# Patient Record
Sex: MALE | Race: WHITE | Employment: OTHER | URBAN - METROPOLITAN AREA
[De-identification: names, ages, dates, MRNs, and addresses within clinical notes are randomized per-mention and may not be internally consistent; named-entity substitution may affect disease eponyms.]

---

## 2017-09-21 DIAGNOSIS — I63.9 STROKE (H): Primary | ICD-10-CM

## 2017-09-21 DIAGNOSIS — G45.9 TRANSIENT CEREBRAL ISCHEMIA: ICD-10-CM

## 2017-09-21 DIAGNOSIS — I63.30 CEREBRAL THROMBOSIS WITH CEREBRAL INFARCTION (H): ICD-10-CM

## 2017-10-03 ENCOUNTER — HOSPITAL ENCOUNTER (OUTPATIENT)
Dept: CARDIOLOGY | Facility: CLINIC | Age: 72
Discharge: HOME | End: 2017-10-03
Attending: PSYCHIATRY & NEUROLOGY | Admitting: PSYCHIATRY & NEUROLOGY
Payer: MEDICARE

## 2017-10-03 DIAGNOSIS — I63.30 CEREBRAL THROMBOSIS WITH CEREBRAL INFARCTION (H): ICD-10-CM

## 2017-10-03 DIAGNOSIS — I63.9 STROKE (H): ICD-10-CM

## 2017-10-03 DIAGNOSIS — G45.9 TRANSIENT CEREBRAL ISCHEMIA: ICD-10-CM

## 2017-10-03 PROCEDURE — 93306 TTE W/DOPPLER COMPLETE: CPT

## 2017-10-03 PROCEDURE — 93306 TTE W/DOPPLER COMPLETE: CPT | Mod: 26 | Performed by: INTERNAL MEDICINE

## 2017-11-26 ENCOUNTER — OFFICE VISIT (OUTPATIENT)
Dept: URGENT CARE | Facility: URGENT CARE | Age: 72
End: 2017-11-26
Payer: COMMERCIAL

## 2017-11-26 ENCOUNTER — RADIANT APPOINTMENT (OUTPATIENT)
Dept: GENERAL RADIOLOGY | Facility: CLINIC | Age: 72
End: 2017-11-26
Attending: FAMILY MEDICINE
Payer: COMMERCIAL

## 2017-11-26 VITALS
OXYGEN SATURATION: 98 % | SYSTOLIC BLOOD PRESSURE: 130 MMHG | TEMPERATURE: 99.2 F | WEIGHT: 187.5 LBS | HEART RATE: 101 BPM | DIASTOLIC BLOOD PRESSURE: 81 MMHG

## 2017-11-26 DIAGNOSIS — R06.2 WHEEZING: ICD-10-CM

## 2017-11-26 DIAGNOSIS — J06.9 UPPER RESPIRATORY TRACT INFECTION, UNSPECIFIED TYPE: Primary | ICD-10-CM

## 2017-11-26 DIAGNOSIS — R07.89 CHEST PRESSURE: ICD-10-CM

## 2017-11-26 DIAGNOSIS — R06.02 SOB (SHORTNESS OF BREATH): ICD-10-CM

## 2017-11-26 DIAGNOSIS — R50.9 FEVER AND CHILLS: ICD-10-CM

## 2017-11-26 DIAGNOSIS — R05.9 COUGH: ICD-10-CM

## 2017-11-26 LAB
D DIMER PPP FEU-MCNC: 0.5 UG/ML FEU (ref 0–0.5)
DIFFERENTIAL METHOD BLD: ABNORMAL
EOSINOPHIL # BLD AUTO: 0.2 10E9/L (ref 0–0.7)
EOSINOPHIL NFR BLD AUTO: 1 %
LYMPHOCYTES # BLD AUTO: 2 10E9/L (ref 0.8–5.3)
LYMPHOCYTES NFR BLD AUTO: 13 %
MONOCYTES # BLD AUTO: 1.2 10E9/L (ref 0–1.3)
MONOCYTES NFR BLD AUTO: 8 %
NEUTROPHILS # BLD AUTO: 11.7 10E9/L (ref 1.6–8.3)
NEUTROPHILS NFR BLD AUTO: 78 %
PLATELET # BLD EST: NORMAL 10*3/UL
RBC MORPH BLD: ABNORMAL
TROPONIN I SERPL-MCNC: <0.015 UG/L (ref 0–0.04)
WBC # BLD AUTO: 15.1 10E9/L (ref 4–11)

## 2017-11-26 PROCEDURE — 99214 OFFICE O/P EST MOD 30 MIN: CPT | Performed by: FAMILY MEDICINE

## 2017-11-26 PROCEDURE — 93000 ELECTROCARDIOGRAM COMPLETE: CPT | Performed by: FAMILY MEDICINE

## 2017-11-26 PROCEDURE — 85004 AUTOMATED DIFF WBC COUNT: CPT | Performed by: FAMILY MEDICINE

## 2017-11-26 PROCEDURE — 85379 FIBRIN DEGRADATION QUANT: CPT | Performed by: FAMILY MEDICINE

## 2017-11-26 PROCEDURE — 84484 ASSAY OF TROPONIN QUANT: CPT | Performed by: FAMILY MEDICINE

## 2017-11-26 PROCEDURE — 36415 COLL VENOUS BLD VENIPUNCTURE: CPT | Performed by: FAMILY MEDICINE

## 2017-11-26 PROCEDURE — 71020 XR CHEST 2 VW: CPT

## 2017-11-26 PROCEDURE — 85048 AUTOMATED LEUKOCYTE COUNT: CPT | Performed by: FAMILY MEDICINE

## 2017-11-26 RX ORDER — CLOPIDOGREL BISULFATE 75 MG/1
75 TABLET ORAL DAILY
COMMUNITY
Start: 2006-05-01

## 2017-11-26 RX ORDER — ATENOLOL 50 MG/1
TABLET ORAL
COMMUNITY
Start: 2006-05-01 | End: 2017-12-25

## 2017-11-26 RX ORDER — TEMAZEPAM 15 MG/1
15 CAPSULE ORAL
COMMUNITY
Start: 2017-07-06 | End: 2017-12-25

## 2017-11-26 RX ORDER — ALBUTEROL SULFATE 90 UG/1
2 AEROSOL, METERED RESPIRATORY (INHALATION) EVERY 6 HOURS PRN
Qty: 1 INHALER | Refills: 0 | Status: SHIPPED | OUTPATIENT
Start: 2017-11-26

## 2017-11-26 RX ORDER — METOPROLOL SUCCINATE 100 MG/1
100 TABLET, EXTENDED RELEASE ORAL DAILY
COMMUNITY
Start: 2017-11-06

## 2017-11-26 RX ORDER — PRAVASTATIN SODIUM 20 MG
80 TABLET ORAL EVERY EVENING
Status: ON HOLD | COMMUNITY
Start: 2006-05-01 | End: 2019-09-25

## 2017-11-26 RX ORDER — ACETAMINOPHEN 500 MG
1000 TABLET ORAL EVERY 6 HOURS PRN
COMMUNITY
Start: 2016-04-07

## 2017-11-26 RX ORDER — HYDROXYZINE HYDROCHLORIDE 10 MG/1
TABLET, FILM COATED ORAL
COMMUNITY
Start: 2016-07-27 | End: 2017-12-25

## 2017-11-26 RX ORDER — AZITHROMYCIN 250 MG/1
TABLET, FILM COATED ORAL
Qty: 6 TABLET | Refills: 1 | Status: SHIPPED | OUTPATIENT
Start: 2017-11-26 | End: 2017-12-25

## 2017-11-26 RX ORDER — HYDROCHLOROTHIAZIDE 12.5 MG/1
CAPSULE ORAL
Refills: 3 | Status: ON HOLD | COMMUNITY
Start: 2016-12-24 | End: 2019-09-25

## 2017-11-26 RX ORDER — TRAZODONE HYDROCHLORIDE 50 MG/1
50 TABLET, FILM COATED ORAL AT BEDTIME
Refills: 0 | COMMUNITY
Start: 2017-09-05

## 2017-11-26 NOTE — NURSING NOTE
"Chief Complaint   Patient presents with     Urgent Care     Chest Pain     chest pain, sob, fever. pt states feels like \"someone is standing on my chest\" x 1 week       Initial /58 (BP Location: Right arm, Patient Position: Chair, Cuff Size: Adult Regular)  Pulse 116  Temp 97.4  F (36.3  C) (Oral)  Wt 187 lb 8 oz (85 kg)  SpO2 98% There is no height or weight on file to calculate BMI.  Medication Reconciliation: unable or not appropriate to perform   Larry Amos Medical Assistant      "

## 2017-11-26 NOTE — MR AVS SNAPSHOT
"              After Visit Summary   2017    Emmanuel [facility]    MRN: [id number]           Patient Information     Date Of Birth          1945        Visit Information        Provider Department      2017 12:35 PM Lorin Valenzuela MD [facility]        Today's Diagnoses     Upper respiratory tract infection, unspecified type    -  1    Chest pressure        Cough        Fever and chills        SOB (shortness of breath)        Wheezing           Follow-ups after your visit        Who to contact     If you have questions or need follow up information about today's clinic visit or your schedule please contact [facility] directly at [phone].  Normal or non-critical lab and imaging results will be communicated to you by MyChart, letter or phone within 4 business days after the clinic has received the results. If you do not hear from us within 7 days, please contact the clinic through MyChart or phone. If you have a critical or abnormal lab result, we will notify you by phone as soon as possible.  Submit refill requests through [employer] or call your pharmacy and they will forward the refill request to us. Please allow 3 business days for your refill to be completed.          Additional Information About Your Visit        MyChart Information     [employer] lets you send messages to your doctor, view your test results, renew your prescriptions, schedule appointments and more. To sign up, go to [email or web address] . Click on \"Log in\" on the left side of the screen, which will take you to the Welcome page. Then click on \"Sign up Now\" on the right side of the page.     You will be asked to enter the access code listed below, as well as some personal information. Please follow the directions to create your username and password.     Your access code is: [email or web address]  Expires: 2018  2:38 PM     Your access code will  in 90 days. If you need " help or a new code, please call your [place] clinic or [phone].        Care EveryWhere ID     This is your Care EveryWhere ID. This could be used by other organizations to access your [place] medical records  [id number]        Your Vitals Were     Pulse Temperature Pulse Oximetry             101 99.2  F (37.3  C) (Oral) 98%          Blood Pressure from Last 3 Encounters:   11/26/17 130/81   08/01/16 (!) 140/92    Weight from Last 3 Encounters:   11/26/17 187 lb 8 oz (85 kg)   08/01/16 180 lb (81.6 kg)              We Performed the Following     D dimer, quantitative     EKG 12-lead complete w/read - Clinics     Troponin I     WBC with Diff     XR Chest 2 Views          Today's Medication Changes          These changes are accurate as of: 11/26/17  2:38 PM.  If you have any questions, ask your nurse or doctor.               Start taking these medicines.        Dose/Directions    albuterol 108 (90 BASE) MCG/ACT Inhaler   Commonly known as:  PROAIR HFA/PROVENTIL HFA/VENTOLIN HFA   Used for:  Wheezing   Started by:  Lorin Valenzuela MD        Dose:  2 puff   Inhale 2 puffs into the lungs every 6 hours as needed for shortness of breath / dyspnea or wheezing   Quantity:  1 Inhaler   Refills:  0       aspirin  MG EC tablet   Used for:  Chest pressure   Started by:  Lorin Valenzuela MD        Dose:  325 mg   Take 1 tablet (325 mg) by mouth once for 1 dose   Quantity:  1 tablet   Refills:  0       azithromycin 250 MG tablet   Commonly known as:  ZITHROMAX   Used for:  Upper respiratory tract infection, unspecified type, Cough, Fever and chills   Started by:  Lorin Valenzuela MD        Two tablets first day, then one tablet daily for four days.   Quantity:  6 tablet   Refills:  1            Where to get your medicines      [facility] [address] [facility] [address] [facility] [address] [facility] [address]     Phone:   [phone]     albuterol 108 (90 BASE) MCG/ACT Inhaler    azithromycin 250 MG tablet         Some of these will need a paper prescription and others can be bought over the counter.  Ask your nurse if you have questions.     You don't need a prescription for these medications     aspirin  MG EC tablet                Primary Care Provider Office Phone # Fax #    Nicolette Carlson [phone]       [facility] [address]  [facility] [address]        Equal Access to Services     DIXIE AREVALO : Hadii aad ku hadasho Soomaali, waaxda luqadaha, qaybta kaalmada adeegyada, waxay idiin hayaan adeeg kharash laanuj . So [facility] [phone].    ATENCIÓN: Si habla español, tiene a watt disposición servicios gratuitos de asistencia lingüística. Ll[facility] [phone].    We comply with applicable federal civil rights laws and [place] laws. We do not discriminate on the basis of race, color, national origin, age, disability, sex, sexual orientation, or gender identity.            Thank you!     Thank you for choosing [facility]  for your care. Our goal is always to provide you with excellent care. Hearing back from our patients is one way we can continue to improve our services. Please take a few minutes to complete the written survey that you may receive in the mail after your visit with us. Thank you!             Your Updated Medication List - Protect others around you: Learn how to safely use, store and throw away your medicines at www.disposemymeds.org.          This list is accurate as of: 11/26/17  2:38 PM.  Always use your most recent med list.                   Brand Name Dispense Instructions for use Diagnosis    acetaminophen 500 MG tablet    TYLENOL     Take 1,000 mg by mouth        albuterol 108 (90 BASE) MCG/ACT Inhaler    PROAIR HFA/PROVENTIL HFA/VENTOLIN HFA    1 Inhaler    Inhale 2 puffs into the lungs every 6 hours as needed for shortness of breath /  dyspnea or wheezing    Wheezing       aspirin  MG EC tablet     1 tablet    Take 1 tablet (325 mg) by mouth once for 1 dose    Chest pressure       atenolol 50 MG tablet    TENORMIN          azithromycin 250 MG tablet    ZITHROMAX    6 tablet    Two tablets first day, then one tablet daily for four days.    Upper respiratory tract infection, unspecified type, Cough, Fever and chills       cephALEXin 500 MG capsule    KEFLEX    30 capsule    Take 1 capsule (500 mg) by mouth 3 times daily    Open wound       clopidogrel 75 MG tablet    PLAVIX          hydrochlorothiazide 12.5 MG capsule    MICROZIDE     TK ONE C PO  QAM TO  LOWER BP AND REMOVE FLUID        hydrOXYzine 10 MG tablet    ATARAX          metoprolol 100 MG 24 hr tablet    TOPROL-XL     Take 100 mg by mouth        PRAVACHOL 20 MG tablet   Generic drug:  pravastatin           temazepam 15 MG capsule    RESTORIL     Take 15 mg by mouth        traZODone 50 MG tablet    DESYREL     TK 1 T PO QHS        vitamin D3 1000 UNITS Caps      Take 1,000 Units by mouth

## 2017-11-26 NOTE — PROGRESS NOTES
"Chief Complaint   Patient presents with     Urgent Care     Chest Pain     chest pressure , sob, fever. pt states feels like \"someone is standing on my chest\" x 1 week     SUBJECTIVE:  Emmanuel [facility] is a 72 year old male who presents to the clinic today with a chief complaint of cough  for 1 week, along with chest pressure and sob for last 1 week  Also noticing fever and chills along with night sweats for the same duration   His cough is described as persistent and productive of yellow sputum.    The patient's symptoms are moderate and worsening.  Associated symptoms include congestion and chest pressure . Chest pressure gets better after coughing The patient's symptoms are exacerbated by no particular triggers  Patient has been using OTC cough suppressants  to improve symptoms.]  He denies any chest pain and has been feeling overall fine   Was given an aspirin immediately thinking if its CAD   He has no chest pressure now and has no chest pain OR SOB      History reviewed. No pertinent past medical history.    Current Outpatient Prescriptions   Medication Sig Dispense Refill     acetaminophen (TYLENOL) 500 MG tablet Take 1,000 mg by mouth       atenolol (TENORMIN) 50 MG tablet        Cholecalciferol (VITAMIN D3) 1000 UNITS CAPS Take 1,000 Units by mouth       clopidogrel (PLAVIX) 75 MG tablet        hydrochlorothiazide (MICROZIDE) 12.5 MG capsule TK ONE C PO  QAM TO  LOWER BP AND REMOVE FLUID  3     hydrOXYzine (ATARAX) 10 MG tablet        metoprolol (TOPROL-XL) 100 MG 24 hr tablet Take 100 mg by mouth       pravastatin (PRAVACHOL) 20 MG tablet        temazepam (RESTORIL) 15 MG capsule Take 15 mg by mouth       traZODone (DESYREL) 50 MG tablet TK 1 T PO QHS  0     cephALEXin (KEFLEX) 500 MG capsule Take 1 capsule (500 mg) by mouth 3 times daily (Patient not taking: Reported on 11/26/2017) 30 capsule 0       Social History   Substance Use Topics     Smoking status: Current Every Day Smoker     Smokeless tobacco: " Not on file     Alcohol use Not on file       ROS  Review of systems negative except as stated above.    OBJECTIVE:  /58 (BP Location: Right arm, Patient Position: Chair, Cuff Size: Adult Regular)  Pulse 116  Temp 97.4  F (36.3  C) (Oral)  Wt 187 lb 8 oz (85 kg)  SpO2 98%  GENERAL APPEARANCE: healthy, alert and no distress  EYES: EOMI,  PERRL, conjunctiva clear  HENT: ear canals and TM's normal.  Nose and mouth without ulcers, erythema or lesions  NECK: supple, nontender, no lymphadenopathy  RESP: lungs clear to auscultation - positive for rales, rhonchi and  Wheezes bilaterally   CV: regular rates and rhythm, normal S1 S2, no murmur noted  ABDOMEN:  soft, nontender, no HSM or masses and bowel sounds normal  SKIN: no suspicious lesions or rashes      Results for orders placed or performed in visit on 11/26/17   XR Chest 2 Views    Narrative    CHEST TWO VIEWS November 26, 2017 1:23 PM     HISTORY: Cough; fever and chills.    COMPARISON: None.    FINDINGS: Heart size normal. Minimal linear opacity left mid lung base  likely fibrosis. Nothing clearly acute.      Impression    IMPRESSION: No acute abnormality.    CLAUDIA CUNNINGHAM MD   WBC with Diff   Result Value Ref Range    WBC 15.1 (H) 4.0 - 11.0 10e9/L    Diff Method PENDING     % Neutrophils 78.0 %    % Lymphocytes 13.0 %    % Monocytes 8.0 %    % Eosinophils 1.0 %    Absolute Neutrophil 11.7 (H) 1.6 - 8.3 10e9/L    Absolute Lymphocytes 2.0 0.8 - 5.3 10e9/L    Absolute Monocytes 1.2 0.0 - 1.3 10e9/L    Absolute Eosinophils 0.2 0.0 - 0.7 10e9/L    RBC Morphology Consistent with reported results     Platelet Estimate Normal    Troponin I   Result Value Ref Range    Troponin I ES <0.015 0.000 - 0.045 ug/L   D dimer, quantitative   Result Value Ref Range    D Dimer 0.5 0.0 - 0.50 ug/ml FEU       ASSESSMENT:    Emmanuel was seen today for urgent care and chest pain.    Diagnoses and all orders for this visit:    Upper respiratory tract infection, unspecified  type  -     azithromycin (ZITHROMAX) 250 MG tablet; Two tablets first day, then one tablet daily for four days.    Chest pressure  -     EKG 12-lead complete w/read - Clinics  -     Troponin I  -     D dimer, quantitative    Cough  -     XR Chest 2 Views  -     azithromycin (ZITHROMAX) 250 MG tablet; Two tablets first day, then one tablet daily for four days.    Fever and chills  -     XR Chest 2 Views  -     azithromycin (ZITHROMAX) 250 MG tablet; Two tablets first day, then one tablet daily for four days.    SOB (shortness of breath)  -     WBC with Diff  -     D dimer, quantitative    Wheezing  -     albuterol (PROAIR HFA/PROVENTIL HFA/VENTOLIN HFA) 108 (90 BASE) MCG/ACT Inhaler; Inhale 2 puffs into the lungs every 6 hours as needed for shortness of breath / dyspnea or wheezing          PLAN:  See orders in Epic  Symptomatic measures encouraged, humidified air, plenty of fluids.discussed with pt if notices any worsening chest pressure or SOB should follow up in the ER   Follow up if  symptoms fail to improve or worsens   Pt understood and agreed with plan

## 2017-12-25 ENCOUNTER — APPOINTMENT (OUTPATIENT)
Dept: GENERAL RADIOLOGY | Facility: CLINIC | Age: 72
DRG: 178 | End: 2017-12-25
Attending: INTERNAL MEDICINE
Payer: MEDICARE

## 2017-12-25 ENCOUNTER — HOSPITAL ENCOUNTER (INPATIENT)
Facility: CLINIC | Age: 72
LOS: 5 days | Discharge: HOME | DRG: 178 | End: 2017-12-30
Attending: INTERNAL MEDICINE | Admitting: INTERNAL MEDICINE
Payer: MEDICARE

## 2017-12-25 DIAGNOSIS — J18.9 PNEUMONIA OF RIGHT LOWER LOBE DUE TO INFECTIOUS ORGANISM: ICD-10-CM

## 2017-12-25 DIAGNOSIS — J44.1 COPD EXACERBATION (H): ICD-10-CM

## 2017-12-25 LAB
ANION GAP SERPL CALCULATED.3IONS-SCNC: 10 MMOL/L (ref 3–14)
BASOPHILS # BLD AUTO: 0 10E9/L (ref 0–0.2)
BASOPHILS NFR BLD AUTO: 0.4 %
BUN SERPL-MCNC: 16 MG/DL (ref 7–30)
CALCIUM SERPL-MCNC: 8.5 MG/DL (ref 8.5–10.1)
CHLORIDE SERPL-SCNC: 100 MMOL/L (ref 94–109)
CO2 SERPL-SCNC: 26 MMOL/L (ref 20–32)
CREAT SERPL-MCNC: 1.27 MG/DL (ref 0.66–1.25)
DIFFERENTIAL METHOD BLD: ABNORMAL
EOSINOPHIL # BLD AUTO: 0.1 10E9/L (ref 0–0.7)
EOSINOPHIL NFR BLD AUTO: 0.5 %
ERYTHROCYTE [DISTWIDTH] IN BLOOD BY AUTOMATED COUNT: 13.3 % (ref 10–15)
FLUAV+FLUBV AG SPEC QL: NEGATIVE
FLUAV+FLUBV AG SPEC QL: NEGATIVE
GFR SERPL CREATININE-BSD FRML MDRD: 55 ML/MIN/1.7M2
GLUCOSE SERPL-MCNC: 106 MG/DL (ref 70–99)
HCT VFR BLD AUTO: 40.3 % (ref 40–53)
HGB BLD-MCNC: 13.8 G/DL (ref 13.3–17.7)
IMM GRANULOCYTES # BLD: 0.1 10E9/L (ref 0–0.4)
IMM GRANULOCYTES NFR BLD: 0.6 %
LYMPHOCYTES # BLD AUTO: 1.9 10E9/L (ref 0.8–5.3)
LYMPHOCYTES NFR BLD AUTO: 20.6 %
MAGNESIUM SERPL-MCNC: 1.8 MG/DL (ref 1.6–2.3)
MCH RBC QN AUTO: 33.3 PG (ref 26.5–33)
MCHC RBC AUTO-ENTMCNC: 34.2 G/DL (ref 31.5–36.5)
MCV RBC AUTO: 97 FL (ref 78–100)
MONOCYTES # BLD AUTO: 1.8 10E9/L (ref 0–1.3)
MONOCYTES NFR BLD AUTO: 18.9 %
NEUTROPHILS # BLD AUTO: 5.5 10E9/L (ref 1.6–8.3)
NEUTROPHILS NFR BLD AUTO: 59 %
NRBC # BLD AUTO: 0 10*3/UL
NRBC BLD AUTO-RTO: 0 /100
PLATELET # BLD AUTO: 219 10E9/L (ref 150–450)
POTASSIUM SERPL-SCNC: 3.7 MMOL/L (ref 3.4–5.3)
RBC # BLD AUTO: 4.14 10E12/L (ref 4.4–5.9)
SODIUM SERPL-SCNC: 136 MMOL/L (ref 133–144)
SPECIMEN SOURCE: NORMAL
TROPONIN I SERPL-MCNC: <0.015 UG/L (ref 0–0.04)
WBC # BLD AUTO: 9.4 10E9/L (ref 4–11)

## 2017-12-25 PROCEDURE — 87040 BLOOD CULTURE FOR BACTERIA: CPT | Performed by: INTERNAL MEDICINE

## 2017-12-25 PROCEDURE — 87804 INFLUENZA ASSAY W/OPTIC: CPT | Performed by: INTERNAL MEDICINE

## 2017-12-25 PROCEDURE — 94640 AIRWAY INHALATION TREATMENT: CPT | Mod: 76

## 2017-12-25 PROCEDURE — 83735 ASSAY OF MAGNESIUM: CPT | Performed by: INTERNAL MEDICINE

## 2017-12-25 PROCEDURE — A9270 NON-COVERED ITEM OR SERVICE: HCPCS | Mod: GY | Performed by: INTERNAL MEDICINE

## 2017-12-25 PROCEDURE — 25000132 ZZH RX MED GY IP 250 OP 250 PS 637: Mod: GY | Performed by: INTERNAL MEDICINE

## 2017-12-25 PROCEDURE — 94640 AIRWAY INHALATION TREATMENT: CPT

## 2017-12-25 PROCEDURE — 25000125 ZZHC RX 250: Performed by: INTERNAL MEDICINE

## 2017-12-25 PROCEDURE — 80048 BASIC METABOLIC PNL TOTAL CA: CPT | Performed by: INTERNAL MEDICINE

## 2017-12-25 PROCEDURE — 85025 COMPLETE CBC W/AUTO DIFF WBC: CPT | Performed by: INTERNAL MEDICINE

## 2017-12-25 PROCEDURE — 40000275 ZZH STATISTIC RCP TIME EA 10 MIN

## 2017-12-25 PROCEDURE — 25000128 H RX IP 250 OP 636: Performed by: INTERNAL MEDICINE

## 2017-12-25 PROCEDURE — 40000274 ZZH STATISTIC RCP CONSULT EA 30 MIN

## 2017-12-25 PROCEDURE — 12000000 ZZH R&B MED SURG/OB

## 2017-12-25 PROCEDURE — 93005 ELECTROCARDIOGRAM TRACING: CPT

## 2017-12-25 PROCEDURE — 99223 1ST HOSP IP/OBS HIGH 75: CPT | Mod: AI | Performed by: INTERNAL MEDICINE

## 2017-12-25 PROCEDURE — 96365 THER/PROPH/DIAG IV INF INIT: CPT

## 2017-12-25 PROCEDURE — 36415 COLL VENOUS BLD VENIPUNCTURE: CPT | Performed by: INTERNAL MEDICINE

## 2017-12-25 PROCEDURE — 71020 XR CHEST 2 VW: CPT

## 2017-12-25 PROCEDURE — 96375 TX/PRO/DX INJ NEW DRUG ADDON: CPT

## 2017-12-25 PROCEDURE — 99285 EMERGENCY DEPT VISIT HI MDM: CPT | Mod: 25

## 2017-12-25 PROCEDURE — 84484 ASSAY OF TROPONIN QUANT: CPT | Performed by: INTERNAL MEDICINE

## 2017-12-25 RX ORDER — AMOXICILLIN 250 MG
2 CAPSULE ORAL 2 TIMES DAILY PRN
Status: DISCONTINUED | OUTPATIENT
Start: 2017-12-25 | End: 2017-12-30 | Stop reason: HOSPADM

## 2017-12-25 RX ORDER — IPRATROPIUM BROMIDE AND ALBUTEROL SULFATE 2.5; .5 MG/3ML; MG/3ML
3 SOLUTION RESPIRATORY (INHALATION) 4 TIMES DAILY
Status: DISCONTINUED | OUTPATIENT
Start: 2017-12-26 | End: 2017-12-30 | Stop reason: HOSPADM

## 2017-12-25 RX ORDER — METHYLPREDNISOLONE SODIUM SUCCINATE 125 MG/2ML
125 INJECTION, POWDER, LYOPHILIZED, FOR SOLUTION INTRAMUSCULAR; INTRAVENOUS ONCE
Status: COMPLETED | OUTPATIENT
Start: 2017-12-25 | End: 2017-12-25

## 2017-12-25 RX ORDER — ONDANSETRON 4 MG/1
4 TABLET, ORALLY DISINTEGRATING ORAL EVERY 6 HOURS PRN
Status: DISCONTINUED | OUTPATIENT
Start: 2017-12-25 | End: 2017-12-30 | Stop reason: HOSPADM

## 2017-12-25 RX ORDER — TRIAMCINOLONE ACETONIDE 55 UG/1
1 SPRAY, METERED NASAL DAILY
Status: ON HOLD | COMMUNITY
End: 2019-09-25

## 2017-12-25 RX ORDER — POTASSIUM CHLORIDE 29.8 MG/ML
20 INJECTION INTRAVENOUS
Status: DISCONTINUED | OUTPATIENT
Start: 2017-12-25 | End: 2017-12-30 | Stop reason: HOSPADM

## 2017-12-25 RX ORDER — IPRATROPIUM BROMIDE AND ALBUTEROL SULFATE 2.5; .5 MG/3ML; MG/3ML
3 SOLUTION RESPIRATORY (INHALATION) ONCE
Status: COMPLETED | OUTPATIENT
Start: 2017-12-25 | End: 2017-12-25

## 2017-12-25 RX ORDER — LANOLIN ALCOHOL/MO/W.PET/CERES
3 CREAM (GRAM) TOPICAL
Status: DISCONTINUED | OUTPATIENT
Start: 2017-12-25 | End: 2017-12-30 | Stop reason: HOSPADM

## 2017-12-25 RX ORDER — SODIUM CHLORIDE AND POTASSIUM CHLORIDE 150; 900 MG/100ML; MG/100ML
INJECTION, SOLUTION INTRAVENOUS CONTINUOUS
Status: DISCONTINUED | OUTPATIENT
Start: 2017-12-25 | End: 2017-12-26

## 2017-12-25 RX ORDER — NALOXONE HYDROCHLORIDE 0.4 MG/ML
.1-.4 INJECTION, SOLUTION INTRAMUSCULAR; INTRAVENOUS; SUBCUTANEOUS
Status: DISCONTINUED | OUTPATIENT
Start: 2017-12-25 | End: 2017-12-30 | Stop reason: HOSPADM

## 2017-12-25 RX ORDER — AMOXICILLIN 250 MG
1 CAPSULE ORAL 2 TIMES DAILY PRN
Status: DISCONTINUED | OUTPATIENT
Start: 2017-12-25 | End: 2017-12-30 | Stop reason: HOSPADM

## 2017-12-25 RX ORDER — CODEINE PHOSPHATE AND GUAIFENESIN 10; 100 MG/5ML; MG/5ML
1 SOLUTION ORAL EVERY 4 HOURS PRN
Status: ON HOLD | COMMUNITY
End: 2017-12-30

## 2017-12-25 RX ORDER — FLUTICASONE PROPIONATE 50 MCG
2 SPRAY, SUSPENSION (ML) NASAL DAILY
Status: DISCONTINUED | OUTPATIENT
Start: 2017-12-25 | End: 2017-12-30 | Stop reason: HOSPADM

## 2017-12-25 RX ORDER — TRAZODONE HYDROCHLORIDE 50 MG/1
50 TABLET, FILM COATED ORAL
Status: DISCONTINUED | OUTPATIENT
Start: 2017-12-25 | End: 2017-12-30 | Stop reason: HOSPADM

## 2017-12-25 RX ORDER — CLOPIDOGREL BISULFATE 75 MG/1
75 TABLET ORAL DAILY
Status: DISCONTINUED | OUTPATIENT
Start: 2017-12-25 | End: 2017-12-30 | Stop reason: HOSPADM

## 2017-12-25 RX ORDER — HYDROCHLOROTHIAZIDE 12.5 MG/1
12.5 CAPSULE ORAL DAILY
Status: DISCONTINUED | OUTPATIENT
Start: 2017-12-25 | End: 2017-12-30 | Stop reason: HOSPADM

## 2017-12-25 RX ORDER — METOPROLOL SUCCINATE 100 MG/1
100 TABLET, EXTENDED RELEASE ORAL DAILY
Status: DISCONTINUED | OUTPATIENT
Start: 2017-12-25 | End: 2017-12-30 | Stop reason: HOSPADM

## 2017-12-25 RX ORDER — PROCHLORPERAZINE MALEATE 5 MG
5 TABLET ORAL EVERY 6 HOURS PRN
Status: DISCONTINUED | OUTPATIENT
Start: 2017-12-25 | End: 2017-12-30 | Stop reason: HOSPADM

## 2017-12-25 RX ORDER — METHYLPREDNISOLONE SODIUM SUCCINATE 125 MG/2ML
60 INJECTION, POWDER, LYOPHILIZED, FOR SOLUTION INTRAMUSCULAR; INTRAVENOUS EVERY 8 HOURS
Status: DISCONTINUED | OUTPATIENT
Start: 2017-12-25 | End: 2017-12-26

## 2017-12-25 RX ORDER — MAGNESIUM SULFATE HEPTAHYDRATE 40 MG/ML
4 INJECTION, SOLUTION INTRAVENOUS EVERY 4 HOURS PRN
Status: DISCONTINUED | OUTPATIENT
Start: 2017-12-25 | End: 2017-12-30 | Stop reason: HOSPADM

## 2017-12-25 RX ORDER — BISACODYL 10 MG
10 SUPPOSITORY, RECTAL RECTAL DAILY PRN
Status: DISCONTINUED | OUTPATIENT
Start: 2017-12-25 | End: 2017-12-30 | Stop reason: HOSPADM

## 2017-12-25 RX ORDER — POTASSIUM CL/LIDO/0.9 % NACL 10MEQ/0.1L
10 INTRAVENOUS SOLUTION, PIGGYBACK (ML) INTRAVENOUS
Status: DISCONTINUED | OUTPATIENT
Start: 2017-12-25 | End: 2017-12-30 | Stop reason: HOSPADM

## 2017-12-25 RX ORDER — ACETAMINOPHEN 500 MG
1000 TABLET ORAL EVERY 6 HOURS PRN
Status: DISCONTINUED | OUTPATIENT
Start: 2017-12-25 | End: 2017-12-26

## 2017-12-25 RX ORDER — CODEINE PHOSPHATE AND GUAIFENESIN 10; 100 MG/5ML; MG/5ML
5 SOLUTION ORAL EVERY 4 HOURS PRN
Status: DISCONTINUED | OUTPATIENT
Start: 2017-12-25 | End: 2017-12-30 | Stop reason: HOSPADM

## 2017-12-25 RX ORDER — ONDANSETRON 2 MG/ML
4 INJECTION INTRAMUSCULAR; INTRAVENOUS EVERY 6 HOURS PRN
Status: DISCONTINUED | OUTPATIENT
Start: 2017-12-25 | End: 2017-12-30 | Stop reason: HOSPADM

## 2017-12-25 RX ORDER — IPRATROPIUM BROMIDE AND ALBUTEROL SULFATE 2.5; .5 MG/3ML; MG/3ML
3 SOLUTION RESPIRATORY (INHALATION) 4 TIMES DAILY
Status: DISCONTINUED | OUTPATIENT
Start: 2017-12-25 | End: 2017-12-25

## 2017-12-25 RX ORDER — PRAVASTATIN SODIUM 20 MG
80 TABLET ORAL EVERY EVENING
Status: DISCONTINUED | OUTPATIENT
Start: 2017-12-25 | End: 2017-12-30 | Stop reason: HOSPADM

## 2017-12-25 RX ORDER — POTASSIUM CHLORIDE 1500 MG/1
20-40 TABLET, EXTENDED RELEASE ORAL
Status: DISCONTINUED | OUTPATIENT
Start: 2017-12-25 | End: 2017-12-30 | Stop reason: HOSPADM

## 2017-12-25 RX ORDER — POTASSIUM CHLORIDE 7.45 MG/ML
10 INJECTION INTRAVENOUS
Status: DISCONTINUED | OUTPATIENT
Start: 2017-12-25 | End: 2017-12-30 | Stop reason: HOSPADM

## 2017-12-25 RX ORDER — POTASSIUM CHLORIDE 1.5 G/1.58G
20-40 POWDER, FOR SOLUTION ORAL
Status: DISCONTINUED | OUTPATIENT
Start: 2017-12-25 | End: 2017-12-30 | Stop reason: HOSPADM

## 2017-12-25 RX ORDER — ALBUTEROL SULFATE 0.83 MG/ML
3 SOLUTION RESPIRATORY (INHALATION)
Status: DISCONTINUED | OUTPATIENT
Start: 2017-12-25 | End: 2017-12-30 | Stop reason: HOSPADM

## 2017-12-25 RX ORDER — MULTIVIT WITH MINERALS/LUTEIN
1 TABLET ORAL DAILY
COMMUNITY

## 2017-12-25 RX ORDER — PROCHLORPERAZINE 25 MG
12.5 SUPPOSITORY, RECTAL RECTAL EVERY 12 HOURS PRN
Status: DISCONTINUED | OUTPATIENT
Start: 2017-12-25 | End: 2017-12-30 | Stop reason: HOSPADM

## 2017-12-25 RX ADMIN — VITAMIN D, TAB 1000IU (100/BT) 1000 UNITS: 25 TAB at 19:15

## 2017-12-25 RX ADMIN — IPRATROPIUM BROMIDE AND ALBUTEROL SULFATE 3 ML: .5; 3 SOLUTION RESPIRATORY (INHALATION) at 15:58

## 2017-12-25 RX ADMIN — IPRATROPIUM BROMIDE AND ALBUTEROL SULFATE 3 ML: .5; 3 SOLUTION RESPIRATORY (INHALATION) at 19:50

## 2017-12-25 RX ADMIN — CLOPIDOGREL 75 MG: 75 TABLET, FILM COATED ORAL at 19:15

## 2017-12-25 RX ADMIN — LEVOFLOXACIN 750 MG: 500 TABLET, FILM COATED ORAL at 19:31

## 2017-12-25 RX ADMIN — POTASSIUM CHLORIDE AND SODIUM CHLORIDE: 900; 150 INJECTION, SOLUTION INTRAVENOUS at 19:30

## 2017-12-25 RX ADMIN — POTASSIUM CHLORIDE 20 MEQ: 1500 TABLET, EXTENDED RELEASE ORAL at 19:16

## 2017-12-25 RX ADMIN — HYDROCHLOROTHIAZIDE 12.5 MG: 12.5 CAPSULE ORAL at 19:15

## 2017-12-25 RX ADMIN — METHYLPREDNISOLONE SODIUM SUCCINATE 62.5 MG: 125 INJECTION, POWDER, FOR SOLUTION INTRAMUSCULAR; INTRAVENOUS at 22:39

## 2017-12-25 RX ADMIN — PRAVASTATIN SODIUM 80 MG: 20 TABLET ORAL at 19:14

## 2017-12-25 RX ADMIN — TAZOBACTAM SODIUM AND PIPERACILLIN SODIUM 4.5 G: 500; 4 INJECTION, SOLUTION INTRAVENOUS at 17:38

## 2017-12-25 RX ADMIN — Medication 2 G: at 20:55

## 2017-12-25 RX ADMIN — METHYLPREDNISOLONE SODIUM SUCCINATE 125 MG: 125 INJECTION, POWDER, FOR SOLUTION INTRAMUSCULAR; INTRAVENOUS at 15:07

## 2017-12-25 RX ADMIN — METOPROLOL SUCCINATE 100 MG: 100 TABLET, EXTENDED RELEASE ORAL at 19:15

## 2017-12-25 RX ADMIN — IPRATROPIUM BROMIDE AND ALBUTEROL SULFATE 3 ML: .5; 3 SOLUTION RESPIRATORY (INHALATION) at 15:17

## 2017-12-25 ASSESSMENT — ACTIVITIES OF DAILY LIVING (ADL): ADLS_ACUITY_SCORE: 15

## 2017-12-25 ASSESSMENT — ENCOUNTER SYMPTOMS
COUGH: 1
SHORTNESS OF BREATH: 1
FEVER: 1

## 2017-12-25 NOTE — IP AVS SNAPSHOT
[place] [address] [facility]    [address]    [facility] [address]    Phone:  [phone]    Fax:  [phone]                                       After Visit Summary   12/25/2017    Emmanuel [facility]    MRN: [id number]           After Visit Summary Signature Page     I have received my discharge instructions, and my questions have been answered. I have discussed any challenges I see with this plan with the nurse or doctor.    ..........................................................................................................................................  Patient/Patient Representative Signature      ..........................................................................................................................................  Patient Representative Print Name and Relationship to Patient    ..................................................               ................................................  Date                                            Time    ..........................................................................................................................................  Reviewed by Signature/Title    ...................................................              ..............................................  Date                                                            Time

## 2017-12-25 NOTE — ED NOTES
Patient c/o SOB that has increasing got worse over past week. Pt recently diagnosed with Pneumonia for 6 weeks ago. Pt finished oral antibiotics. Pt A&Ox4.

## 2017-12-25 NOTE — IP AVS SNAPSHOT
MRN:[id number]                      After Visit Summary   12/25/2017    Emmanuel Clinton    MRN: [id number]           Thank you!     Thank you for choosing [facility] for your care. Our goal is always to provide you with excellent care. Hearing back from our patients is one way we can continue to improve our services. Please take a few minutes to complete the written survey that you may receive in the mail after you visit. If you would like to speak to someone directly about your visit please contact Patient Relations at [phone]. Thank you!          Patient Information     Date Of Birth          1945        Designated Caregiver       Most Recent Value    Caregiver    Will someone help with your care after discharge? yes    Name of designated caregiver Mirta Clinton    Phone number of caregiver [phone]    Caregiver address [address] [place] [address], [address]      About your hospital stay     You were admitted on:  December 25, 2017 You last received care in the:  [place] [address] [facility]    You were discharged on:  December 30, 2017        Reason for your hospital stay       Admitted for increasing cough, SOB found with pneumonia and CoPD flare                  Who to Call     For medical emergencies, please call 911.  For non-urgent questions about your medical care, please call your primary care provider or clinic, [phone]          Attending Provider     Provider Specialty    Neena Jacobsen MD Emergency Medicine    [place]Jayden MD Internal Medicine       Primary Care Provider Office Phone # Fax #    Nicolette Carlson [phone]      After Care Instructions     Activity       Your activity upon discharge: activity as tolerated            Diet       Follow this diet upon discharge: Orders Placed This Encounter      Combination Diet Regular Diet Adult                  Follow-up Appointments     Follow-up and recommended labs and  "tests        Follow up with primary care provider, Nicolette Carlson, within 7 days to evaluate medication change, to evaluate treatment change and for hospital follow- up.  No follow up labs or test are needed.  Complete smoking cessation  Pulmonary service follow up for COPD and recent pneumothorax                  Pending Results     Date and Time Order Name Status Description    2017 1643 Blood culture Preliminary     2017 1640 Blood culture Preliminary             Statement of Approval     Ordered          17 3955  I have reviewed and agree with all the recommendations and orders detailed in this document.  EFFECTIVE NOW     Approved and electronically signed by:  Indra Inman MD             Admission Information     Date & Time Provider Department Dept. Phone    2017 Jayden Alas MD [place] [address] [facility] [phone]      Your Vitals Were     Blood Pressure Temperature Respirations Height Weight Pulse Oximetry    158/86 (BP Location: Right arm) 96.4  F (35.8  C) (Oral) 18 1.829 m (6') 82.8 kg (182 lb 8 oz) 90%    BMI (Body Mass Index)                   24.75 kg/m2           [facility] Information     [facility] lets you send messages to your doctor, view your test results, renew your prescriptions, schedule appointments and more. To sign up, go to [email or web address] . Click on \"Log in\" on the left side of the screen, which will take you to the Welcome page. Then click on \"Sign up Now\" on the right side of the page.     You will be asked to enter the access code listed below, as well as some personal information. Please follow the directions to create your username and password.     Your access code is: [email or web address]  Expires: 2018  2:38 PM     Your access code will  in 90 days. If you need help or a new code, please call your [facility] or [phone].        Care EveryWhere ID     This is your Care EveryWhere ID. This could be used by other " organizations to access your [facility] medical records  [id number]        Equal Access to Services     DIXIE AREVALO : Nkechi Rodriguez, sal morgan, paloma belcher. So [facility] [phone].    ATENCIÓN: Si habla español, tiene a watt disposición servicios gratuitos de asistencia lingüística. Llame al [phone].    We comply with applicable federal civil rights laws and [place] laws. We do not discriminate on the basis of race, color, national origin, age, disability, sex, sexual orientation, or gender identity.               Review of your medicines      START taking        Dose / Directions    levofloxacin 750 MG tablet   Commonly known as:  LEVAQUIN   Indication:  Community Acquired Pneumonia   Used for:  Pneumonia of right lower lobe due to infectious organism (H)        Dose:  750 mg   Take 1 tablet (750 mg) by mouth every 24 hours for 2 days   Quantity:  2 tablet   Refills:  0       nicotine 14 MG/24HR 24 hr patch   Commonly known as:  NICODERM CQ        Dose:  1 patch   Place 1 patch onto the skin every 24 hours   Quantity:  30 patch   Refills:  0       predniSONE 10 MG tablet   Commonly known as:  DELTASONE        Take 2 tabs (20 mg) by mouth daily x 3 days, 1 tabs (10 mg) daily x 3 days then stop 1   Quantity:  9 tablet   Refills:  0         CONTINUE these medicines which have NOT CHANGED        Dose / Directions    acetaminophen 500 MG tablet   Commonly known as:  TYLENOL        Dose:  1000 mg   Take 1,000 mg by mouth every 6 hours as needed   Refills:  0       albuterol 108 (90 BASE) MCG/ACT Inhaler   Commonly known as:  PROAIR HFA/PROVENTIL HFA/VENTOLIN HFA   Used for:  Wheezing        Dose:  2 puff   Inhale 2 puffs into the lungs every 6 hours as needed for shortness of breath / dyspnea or wheezing   Quantity:  1 Inhaler   Refills:  0       CENTRUM SILVER per tablet        Dose:  1 tablet   Take 1 tablet by mouth daily   Refills:  0        clopidogrel 75 MG tablet   Commonly known as:  PLAVIX        Dose:  75 mg   Take 75 mg by mouth daily   Refills:  0       guaiFENesin-codeine 100-10 MG/5ML Soln solution   Commonly known as:  ROBITUSSIN AC        Dose:  1 tsp.   Take 5 mLs by mouth every 4 hours as needed for cough   Quantity:  180 mL   Refills:  0       hydrochlorothiazide 12.5 MG capsule   Commonly known as:  MICROZIDE        TK ONE C PO  QAM TO  LOWER BP AND REMOVE FLUID   Refills:  3       metoprolol 100 MG 24 hr tablet   Commonly known as:  TOPROL-XL        Dose:  100 mg   Take 100 mg by mouth daily   Refills:  0       NASACORT AQ 55 MCG/ACT Inhaler   Generic drug:  triamcinolone        Dose:  1 spray   Spray 1 spray into both nostrils daily   Refills:  0       PRAVACHOL 20 MG tablet   Generic drug:  pravastatin        Dose:  80 mg   Take 80 mg by mouth every evening   Refills:  0       traZODone 50 MG tablet   Commonly known as:  DESYREL        TK 1 T PO QHS prn   Refills:  0       vitamin D3 1000 UNITS Caps        Dose:  1000 Units   Take 1,000 Units by mouth daily   Refills:  0            Where to get your medicines      These medications were sent to [facility] [id number] [place] [address]     Phone:  [phone]     levofloxacin 750 MG tablet    nicotine 14 MG/24HR 24 hr patch    predniSONE 10 MG tablet         Some of these will need a paper prescription and others can be bought over the counter. Ask your nurse if you have questions.     Bring a paper prescription for each of these medications     guaiFENesin-codeine 100-10 MG/5ML Soln solution               ANTIBIOTIC INSTRUCTION     You've Been Prescribed an Antibiotic - Now What?  Your healthcare team thinks that you or your loved one might have an infection. Some infections can be treated with antibiotics, which are powerful, life-saving drugs. Like all medications, antibiotics have side effects and  should only be used when necessary. There are some important things you should know about your antibiotic treatment.      Your healthcare team may run tests before you start taking an antibiotic.    Your team may take samples (e.g., from your blood, urine or other areas) to run tests to look for bacteria. These test can be important to determine if you need an antibiotic at all and, if you do, which antibiotic will work best.      Within a few days, your healthcare team might change or even stop your antibiotic.    Your team may start you on an antibiotic while they are working to find out what is making you sick.    Your team might change your antibiotic because test results show that a different antibiotic would be better to treat your infection.    In some cases, once your team has more information, they learn that you do not need an antibiotic at all. They may find out that you don't have an infection, or that the antibiotic you're taking won't work against your infection. For example, an infection caused by a virus can't be treated with antibiotics. Staying on an antibiotic when you don't need it is more likely to be harmful than helpful.      You may experience side effects from your antibiotic.    Like all medications, antibiotics have side effects. Some of these can be serious.    Let you healthcare team know if you have any known allergies when you are admitted to the hospital.    One significant side effect of nearly all antibiotics is the risk of severe and sometimes deadly diarrhea caused by Clostridium difficile (C. Difficile). This occurs when a person takes antibiotics because some good germs are destroyed. Antibiotic use allows C. diificile to take over, putting patients at high risk for this serious infection.    As a patient or caregiver, it is important to understand your or your loved one's antibiotic treatment. It is especially important for caregivers to speak up when patients can't speak for  themselves. Here are some important questions to ask your healthcare team.    What infection is this antibiotic treating and how do you know I have that infection?    What side effects might occur from this antibiotic?    How long will I need to take this antibiotic?    Is it safe to take this antibiotic with other medications or supplements (e.g., vitamins) that I am taking?     Are there any special directions I need to know about taking this antibiotic? For example, should I take it with food?    How will I be monitored to know whether my infection is responding to the antibiotic?    What tests may help to make sure the right antibiotic is prescribed for me?      Information provided by:  www.cdc.gov/getsmart  U.S. Department of Health and Human Services  Centers for disease Control and Prevention  National Center for Emerging and Zoonotic Infectious Diseases  Division of Healthcare Quality Promotion         Protect others around you: Learn how to safely use, store and throw away your medicines at www.disposemymeds.org.             Medication List: This is a list of all your medications and when to take them. Check marks below indicate your daily home schedule. Keep this list as a reference.      Medications           Morning Afternoon Evening Bedtime As Needed    acetaminophen 500 MG tablet   Commonly known as:  TYLENOL   Take 1,000 mg by mouth every 6 hours as needed   Next Dose Due:  Not received during this admission. Resume as needed                                    albuterol 108 (90 BASE) MCG/ACT Inhaler   Commonly known as:  PROAIR HFA/PROVENTIL HFA/VENTOLIN HFA   Inhale 2 puffs into the lungs every 6 hours as needed for shortness of breath / dyspnea or wheezing   Next Dose Due:  Not received during this admission. Resume as needed                                    CENTRUM SILVER per tablet   Take 1 tablet by mouth daily                                   clopidogrel 75 MG tablet   Commonly known as:   PLAVIX   Take 75 mg by mouth daily   Last time this was given:  75 mg on 12/30/2017  9:05 AM   Next Dose Due:  Not received during this admission. Resume as needed                                    guaiFENesin-codeine 100-10 MG/5ML Soln solution   Commonly known as:  ROBITUSSIN AC   Take 5 mLs by mouth every 4 hours as needed for cough   Last time this was given:  5 mLs on 12/30/2017 10:59 AM   Next Dose Due:  Due at 3:00pm                                    hydrochlorothiazide 12.5 MG capsule   Commonly known as:  MICROZIDE   TK ONE C PO  QAM TO  LOWER BP AND REMOVE FLUID   Last time this was given:  12.5 mg on 12/30/2017  9:04 AM   Next Dose Due:  Resume home schedule tomorrow, Sunday 12/31                                   levofloxacin 750 MG tablet   Commonly known as:  LEVAQUIN   Take 1 tablet (750 mg) by mouth every 24 hours for 2 days   Last time this was given:  750 mg on 12/29/2017  7:21 PM   Next Dose Due:  Resume home schedule tomorrow, Sunday 12/31                                   metoprolol 100 MG 24 hr tablet   Commonly known as:  TOPROL-XL   Take 100 mg by mouth daily   Last time this was given:  100 mg on 12/30/2017  9:05 AM   Next Dose Due:  Resume home schedule tomorrow, Sunday 12/31                                   NASACORT AQ 55 MCG/ACT Inhaler   Spray 1 spray into both nostrils daily   Generic drug:  triamcinolone   Next Dose Due:  Not received during this admission. Resume as needed                                    nicotine 14 MG/24HR 24 hr patch   Commonly known as:  NICODERM CQ   Place 1 patch onto the skin every 24 hours   Next Dose Due:  Not received during this admission. Resume as needed                                    PRAVACHOL 20 MG tablet   Take 80 mg by mouth every evening   Last time this was given:  80 mg on 12/29/2017  7:21 PM   Generic drug:  pravastatin   Next Dose Due:  Take this evening                                    predniSONE 10 MG tablet   Commonly known as:   DELTASONE   Take 2 tabs (20 mg) by mouth daily x 3 days, 1 tabs (10 mg) daily x 3 days then stop 1   Last time this was given:  40 mg on 12/30/2017  9:08 AM   Next Dose Due:  Resume home schedule tomorrow, Sunday 12/31                                   traZODone 50 MG tablet   Commonly known as:  DESYREL   TK 1 T PO QHS prn   Last time this was given:  50 mg on 12/26/2017 12:50 AM   Next Dose Due:  Not received during this admission. Resume as needed                                    vitamin D3 1000 UNITS Caps   Take 1,000 Units by mouth daily   Next Dose Due:  Not received during this admission. Resume as needed

## 2017-12-25 NOTE — PHARMACY-ADMISSION MEDICATION HISTORY
Admission medication history interview status for this patient is complete. See [facility] admission navigator for allergy information, prior to admission medications and immunization status.     Medication history interview source(s):Patient  Medication history resources (including written lists, pill bottles, clinic record):clinic records from [facility]    Changes made to PTA medication list:  Added: APAP, vitamin D, plavix, toprol, pravachol  Deleted:  z-santa and Keflex  Changed: trazodone    Actions taken by pharmacist (provider contacted, etc):None     Additional medication history information:None    Medication reconciliation/reorder completed by provider prior to medication history? No    For patients on insulin therapy: no (Yes/No)   Lantus/levemir/NPH/Mix 70/30 dose: ___ in AM/PM or twice daily   Sliding scale Novolog Y/N   If Yes, do you have a baseline novolog pre-meal dose: ______units with meals   Patients eat three meals a day: Y/N ---  How many episodes of hypoglycemia (low blood glucose) do you have weekly: ---   How many missed doses do you have a week: ---  How many times do you check your blood glucose per day: ---  Any Barriers to therapy: cost of medications/comfortable with giving injections (if applicable)/ comfortable and confident with current diabetes regimen ---      Prior to Admission medications    Medication Sig Last Dose Taking? Auth Provider   guaiFENesin-codeine (ROBITUSSIN AC) 100-10 MG/5ML SOLN solution Take 1 tsp. by mouth every 4 hours as needed for cough Past Month at Unknown time Yes Unknown, Entered By History   Multiple Vitamins-Minerals (CENTRUM SILVER) per tablet Take 1 tablet by mouth daily Past Week at Unknown time Yes Unknown, Entered By History   triamcinolone (NASACORT AQ) 55 MCG/ACT Inhaler Spray 1 spray into both nostrils daily Past Week at Unknown time Yes Unknown, Entered By History   acetaminophen (TYLENOL) 500 MG tablet Take 1,000 mg by mouth every 6 hours as needed   12/25/2017 at am Yes Reported, Patient   Cholecalciferol (VITAMIN D3) 1000 UNITS CAPS Take 1,000 Units by mouth daily  Past Week at Unknown time Yes Reported, Patient   clopidogrel (PLAVIX) 75 MG tablet Take 75 mg by mouth daily  Past Week at Unknown time Yes Reported, Patient   hydrochlorothiazide (MICROZIDE) 12.5 MG capsule TK ONE C PO  QAM TO  LOWER BP AND REMOVE FLUID Past Week at Unknown time Yes Reported, Patient   metoprolol (TOPROL-XL) 100 MG 24 hr tablet Take 100 mg by mouth daily  Past Week at Unknown time Yes Reported, Patient   pravastatin (PRAVACHOL) 20 MG tablet Take 80 mg by mouth every evening  Past Week at Unknown time Yes Reported, Patient   traZODone (DESYREL) 50 MG tablet TK 1 T PO QHS prn Past Week at Unknown time Yes Reported, Patient   albuterol (PROAIR HFA/PROVENTIL HFA/VENTOLIN HFA) 108 (90 BASE) MCG/ACT Inhaler Inhale 2 puffs into the lungs every 6 hours as needed for shortness of breath / dyspnea or wheezing 12/25/2017 at am Yes Lorin Valenzuela MD

## 2017-12-25 NOTE — H&P
[facility]  History and Physical   Hospitalist Service    Jayden Alas MD    Emmanuel Clinton MRN# [id number]   YOB: 1945 Age: 72 year old      Date of Admission:  12/25/2017           Assessment and Plan:   Emmanuel Clinton is a 72-year-old male with history of COPD (not dependent on supplemental oxygen or steroids),  hypercholesterolemia, hypertension, TIA, and left cerebellar stroke.  He has been having pulmonary problems for the last 5 or 6 weeks.  Initially, he was treated with a Z-Ton for suspected bronchitis.  He did not improve so he took a second Z-Ton.  Again, he did not improve.  2 weeks ago he saw one of his care providers who obtained a CT scan of the chest which suggested a left sided pneumonia.  He was treated with Augmentin for 10 days and prednisone taper over 14 days.  Initially he improved with this.  Over the last 3 days, however, he has developed recurrent shortness of breath, productive cough, and subjective fever.  He came to the emergency department for evaluation.  Emergency department workup showed tachycardia with heart rate of 115, hypertension, normal temperature, hypoxia with need for supplemental oxygen, unremarkable CBC, elevated creatinine of 1.27, negative influenza testing, EKG without ischemic changes, and chest x-ray with right lower lobe infiltrate suggestive of pneumonia.  He was given Zosyn, Solu-Medrol, and two duo nebs in the emergency department.  I was asked to admit him for further treatment of right lower lobe pneumonia and COPD exacerbation.    Problem list:    1.  Right lower lung pneumonia.  He has already been treated with 2 courses of Zithromax and one course of Augmentin.  The pneumonia identified on CT scan was left-sided according to Emmanuel.  Consider resistant organism.  Consider viral pneumonia.  I will treat with Levaquin.  Monitor clinically.    2.  COPD with acute exacerbation.  He did receive 125 mg of IV Solu-Medrol  in the emergency department.  I will continue Solu-Medrol 60 mg every 8 hours.  I will schedule duo nebs 4 times daily and have albuterol nebs available every 2 hours as needed.  Supplemental oxygen will be used as needed.    3.  Hypertension.  Continue prior to admission metoprolol and hydrochlorothiazide.    4.  Hypercholesterolemia.  Continue prior to admission Pravachol.    5.  Cerebrovascular disease.  Continue prior to admission protocol and Plavix.    Full code  Mechanical DVT prophylaxis  Disposition:  Admit as inpatient         Code Status:   Full Code         Primary Care Physician:   Nicolette Carlson [phone]         Chief Complaint:   Cough, subjective fever, and shortness of breath    History is obtained from Dr. Delmar Benitez, and the medical record         History of Present Illness:   Emmanuel Clinton is a 72-year-old male with history of COPD (not dependent on supplemental oxygen or steroids),  hypercholesterolemia, hypertension, TIA, and left cerebellar stroke.  He has been having pulmonary problems for the last 5 or 6 weeks.  Initially, he was treated with a Z-Ton for suspected bronchitis.  He did not improve so he took a second Z-Ton.  Again, he did not improve.  2 weeks ago he saw one of his care providers who obtained a CT scan of the chest which suggested a left sided pneumonia.  He was treated with Augmentin for 10 days and prednisone taper over 14 days.  Initially he improved with this.  Over the last 3 days, however, he has developed recurrent shortness of breath, productive cough, and subjective fever.  He came to the emergency department for evaluation.  Emergency department workup showed tachycardia with heart rate of 115, hypertension, normal temperature, hypoxia with need for supplemental oxygen, unremarkable CBC, elevated creatinine of 1.27, negative influenza testing, EKG without ischemic changes, and chest x-ray with right lower lobe infiltrate suggestive of pneumonia.  He  was given Zosyn, Solu-Medrol, and two duo nebs in the emergency department.  I was asked to admit him for further treatment of right lower lobe pneumonia and COPD exacerbation.           Past Medical History:     Patient Active Problem List   Diagnosis     fsocLUMB/LUMBOSAC DISC DEGEN     Nonallopathic lesion of lumbar region     Nonallopathic lesion of sacral region     Nonallopathic lesion of thoracic region     COPD exacerbation (H)     Pneumonia due to infectious organism      Past Medical History:   Diagnosis Date     COPD (chronic obstructive pulmonary disease) (H)    Hypercholesterolemia  Hypertension  Previous TIA  Previous left-sided cerebellar stroke       Past Surgical History:   None         Home Medications:     Prior to Admission medications    Medication Sig Last Dose Taking? Auth Provider   guaiFENesin-codeine (ROBITUSSIN AC) 100-10 MG/5ML SOLN solution Take 1 tsp. by mouth every 4 hours as needed for cough Past Month at Unknown time Yes Unknown, Entered By History   Multiple Vitamins-Minerals (CENTRUM SILVER) per tablet Take 1 tablet by mouth daily Past Week at Unknown time Yes Unknown, Entered By History   triamcinolone (NASACORT AQ) 55 MCG/ACT Inhaler Spray 1 spray into both nostrils daily Past Week at Unknown time Yes Unknown, Entered By History   acetaminophen (TYLENOL) 500 MG tablet Take 1,000 mg by mouth every 6 hours as needed  12/25/2017 at am Yes Reported, Patient   Cholecalciferol (VITAMIN D3) 1000 UNITS CAPS Take 1,000 Units by mouth daily  Past Week at Unknown time Yes Reported, Patient   clopidogrel (PLAVIX) 75 MG tablet Take 75 mg by mouth daily  Past Week at Unknown time Yes Reported, Patient   hydrochlorothiazide (MICROZIDE) 12.5 MG capsule TK ONE C PO  QAM TO  LOWER BP AND REMOVE FLUID Past Week at Unknown time Yes Reported, Patient   metoprolol (TOPROL-XL) 100 MG 24 hr tablet Take 100 mg by mouth daily  Past Week at Unknown time Yes Reported, Patient   pravastatin (PRAVACHOL) 20 MG  tablet Take 80 mg by mouth every evening  Past Week at Unknown time Yes Reported, Patient   traZODone (DESYREL) 50 MG tablet TK 1 T PO QHS prn Past Week at Unknown time Yes Reported, Patient   albuterol (PROAIR HFA/PROVENTIL HFA/VENTOLIN HFA) 108 (90 BASE) MCG/ACT Inhaler Inhale 2 puffs into the lungs every 6 hours as needed for shortness of breath / dyspnea or wheezing 12/25/2017 at am Yes Lorin Valenzuela MD            Allergies:   No Known Allergies         Social History:     Social History   Substance Use Topics     Smoking status: Current Every Day Smoker     Smokeless tobacco: Not on file     Alcohol use Not on file     Denies abuse of alcohol          Family History:   No family history of immune disorder           Review of Systems:   The 10 point Review of Systems is negative other than as noted in the HPI.           Physical Exam:   Blood pressure 156/80, temperature 97.7  F (36.5  C), temperature source Oral, resp. rate 28, SpO2 99 %.  0 lbs 0 oz      GENERAL: Pleasant and cooperative. Mild to moderate acute distress due to shortness of breath.  EYES: Pupils equal and round. No scleral erythema or icterus.  ENT: External ears are normal without deformity. Posterior oropharynx is without erythem, swelling, or exudate.  NECK: Supple. No masses or swelling. No tenderness. Thyroid is normal without mass or tenderness.  CHEST: Left base rhonchi to auscultation. Decreased breath sounds throughout. No retractions.   CV: Regular rate and rhythm. No JVD. Pulses normal.  ABDOMEN: Bowel sounds present. No tenderness. No masses or hernia.  EXTREMETIES: No clubbing, cyanosis, or ischemia.  SKIN: Warm and dry to touch. No wounds or rashes.  NEUROLOGIC: Strength and sensation are normal. Deep tendon reflexes are normal. Cranial nerves are normal.             Data:   All new lab and imaging data was reviewed.     Results for orders placed or performed during the hospital encounter of 12/25/17 (from the past 24  hour(s))   EKG 12 lead   Result Value Ref Range    Interpretation ECG Click View Image link to view waveform and result    CBC with platelets differential   Result Value Ref Range    WBC 9.4 4.0 - 11.0 10e9/L    RBC Count 4.14 (L) 4.4 - 5.9 10e12/L    Hemoglobin 13.8 13.3 - 17.7 g/dL    Hematocrit 40.3 40.0 - 53.0 %    MCV 97 78 - 100 fl    MCH 33.3 (H) 26.5 - 33.0 pg    MCHC 34.2 31.5 - 36.5 g/dL    RDW 13.3 10.0 - 15.0 %    Platelet Count 219 150 - 450 10e9/L    Diff Method Automated Method     % Neutrophils 59.0 %    % Lymphocytes 20.6 %    % Monocytes 18.9 %    % Eosinophils 0.5 %    % Basophils 0.4 %    % Immature Granulocytes 0.6 %    Nucleated RBCs 0 0 /100    Absolute Neutrophil 5.5 1.6 - 8.3 10e9/L    Absolute Lymphocytes 1.9 0.8 - 5.3 10e9/L    Absolute Monocytes 1.8 (H) 0.0 - 1.3 10e9/L    Absolute Eosinophils 0.1 0.0 - 0.7 10e9/L    Absolute Basophils 0.0 0.0 - 0.2 10e9/L    Abs Immature Granulocytes 0.1 0 - 0.4 10e9/L    Absolute Nucleated RBC 0.0    Basic metabolic panel   Result Value Ref Range    Sodium 136 133 - 144 mmol/L    Potassium 3.7 3.4 - 5.3 mmol/L    Chloride 100 94 - 109 mmol/L    Carbon Dioxide 26 20 - 32 mmol/L    Anion Gap 10 3 - 14 mmol/L    Glucose 106 (H) 70 - 99 mg/dL    Urea Nitrogen 16 7 - 30 mg/dL    Creatinine 1.27 (H) 0.66 - 1.25 mg/dL    GFR Estimate 55 (L) >60 mL/min/1.7m2    GFR Estimate If Black 67 >60 mL/min/1.7m2    Calcium 8.5 8.5 - 10.1 mg/dL   Troponin I   Result Value Ref Range    Troponin I ES <0.015 0.000 - 0.045 ug/L   Influenza A/B antigen   Result Value Ref Range    Influenza A/B Agn Specimen Nasal     Influenza A Negative NEG^Negative    Influenza B Negative NEG^Negative   XR Chest 2 Views    Narrative    CHEST TWO VIEWS    12/25/2017 4:21 PM     HISTORY: Chest pain.     COMPARISON: None.      Impression    IMPRESSION: Subtle hazy right lower lobe opacity best seen on the  frontal view could represent asymmetric vascular congestion or  infiltrate. Unchanged  blunting of the costophrenic angles. No  pneumothorax. Cardiac silhouette within normal limits.    FARZANA CURIEL MD

## 2017-12-25 NOTE — LETTER
Key Recommendations:  Admitted with PNA and COPD exacerbation.  Failed outpatient treatment.  Is scheduled with [facility] on Feb 18th.  He is aware he should move this appt up if his symptoms return.  He still smokes 5 cigarettes a day.  He is aware of smoking cessation, but thinking of stopping by himself.  A1C 12/26/17 is 6.1.    Mojgan Lo    AVS/Discharge Summary is the source of truth; this is a helpful guide for improved communication of patient story

## 2017-12-25 NOTE — ED PROVIDER NOTES
History     Chief Complaint:  Shortness of Breath        HPI   Emmanuel Clinton is a 72 year old male with a history of COPD and Pneumonia 6 weeks ago who presents to the emergency department today for evaluation of shortness of breath. The patient was treated and finished a course of antibiotics for pneumonia and was feeling better. However, the patient states that about 3 days ago he began experiencing labored breathing. He notes that he been running a fever intermittently in addition to having a productive cough. The patient uses an albuterol inhaler to treat his breathing symptoms.     Allergies:  No Known Drug Allergies     Medications:    acetaminophen (TYLENOL) 500 MG tablet  atenolol (TENORMIN) 50 MG tablet  Cholecalciferol (VITAMIN D3) 1000 UNITS CAPS  clopidogrel (PLAVIX) 75 MG tablet  hydrochlorothiazide (MICROZIDE) 12.5 MG capsule  hydrOXYzine (ATARAX) 10 MG tablet  metoprolol (TOPROL-XL) 100 MG 24 hr tablet  pravastatin (PRAVACHOL) 20 MG tablet  temazepam (RESTORIL) 15 MG capsule  traZODone (DESYREL) 50 MG tablet  azithromycin (ZITHROMAX) 250 MG tablet  albuterol (PROAIR HFA/PROVENTIL HFA/VENTOLIN HFA) 108 (90 BASE) MCG/ACT Inhaler  cephALEXin (KEFLEX) 500 MG capsule    Past Medical History:    COPD    Past Surgical History:    History reviewed.  No significant past surgical history.     Family History:    History reviewed.  No significant family history.     Social History:  Smoking Status: current every day smoker  Marital Status:   [2]     Review of Systems   Constitutional: Positive for fever.   Respiratory: Positive for cough and shortness of breath.    All other systems reviewed and are negative.      Physical Exam     Patient Vitals for the past 24 hrs:   BP Temp Temp src Heart Rate Resp SpO2   12/25/17 1637 - - - 105 - 99 %   12/25/17 1636 - - - 109 - 99 %   12/25/17 1635 - - - 101 - 96 %   12/25/17 1634 - - - 99 - 100 %   12/25/17 1633 - - - 98 - 98 %   12/25/17 1632 - - - 98 - 99 %    12/25/17 1631 - - - 97 - 100 %   12/25/17 1558 - - - - - 92 %   12/25/17 1517 - - - - - 97 %   12/25/17 1437 151/88 97.7  F (36.5  C) Oral 113 28 100 %       Physical Exam   Constitutional: He is cooperative.   HENT:   Right Ear: Tympanic membrane normal.   Left Ear: Tympanic membrane normal.   Mouth/Throat: Oropharynx is clear and moist and mucous membranes are normal.   Eyes: Conjunctivae are normal.   Neck: Normal range of motion.   Cardiovascular: Regular rhythm and normal heart sounds.    Pulmonary/Chest: Accessory muscle usage present. He has decreased breath sounds. He has wheezes.   Speaks only a few words before pausing for breath   Abdominal: Soft. Normal appearance and bowel sounds are normal. There is no rebound and no guarding.   Musculoskeletal: Normal range of motion.   Lymphadenopathy:     He has no cervical adenopathy.   Neurological: He is alert.   Skin: Skin is warm and dry.   Psychiatric: He has a normal mood and affect.         Emergency Department Course   ECG:  Indication: Shortness of Breath  Completed at 1446.  Read at 1446.   Rate 105 bpm. DE interval 122 ms. QRS duration 76 ms. QT/QTc 346/457 ms. P-R-T axes 67 55 55.  Sinus tachycardia with premature atrial complexes with premature atrial complexes with aberran conduction. Otherwise normal ECG.     Imaging:  Radiographic findings were communicated with the patient who voiced understanding of the findings.    XR Chest 2 Views:  Subtle hazy right lower lobe opacity best seen on the  frontal view could represent asymmetric vascular congestion or  infiltrate. Unchanged blunting of the costophrenic angles. No  pneumothorax. Cardiac silhouette within normal limits. As per radiology.    Laboratory:  CBC: RBC 1.41 (L), MCH 33.3 (H), Absolute Monocytes 1.8 (H) o/w WNL. (WBC 9.4, HGB 13.8, )   CMP: Creatinine: 1.27 (H), GFR Estimate 55 (L), Glucose 106 (H)  Influenza A/B antigen: Negative  1526 Troponin I: <0.015  Blood Cultures:  Pending    Interventions:  1507 solu-Medrol 125 mg IV  1517 Duoneb 2 mL Nebulization  1558 Duoneb 3 mL Nebulization  1738 Zosyn 4.5 G IV    Emergency Department Course:  Nursing notes and vitals reviewed. I performed an exam of the patient as documented above.     The patient was tested for Influenza, see results above.     Blood drawn. This was sent to the lab for further testing, results above.    The patient was sent for the following imaging studies while in the emergency department: XR Chest 2 Views.    1520 I reevaluated the patient and provided an update in regards to his ED course.      1656 I consulted with Dr. Alas of the hospitalist services.    Findings and plan explained to the Patient who consents to admission. Discussed the patient with Dr. Alas, who will admit the patient to a medical bed for further monitoring, evaluation, and treatment.      Impression & Plan    Medical Decision Making:  Emmanuel Clinton is a 72 year old male with underlying COPD who has had a recent prolonged course of respiratory trouble with multiple courses of antibiotics and steroids. Despite this, he returns in significant distress, able to speak only a few words at a time. With aggressive treatment with inhalers and steroids he is feeling improved though still visibly labored even at rest. His chest XR shows opacity suspicious for pneumonia. He is at adalid risk of multidrug resistant organisms, given his recent antibiotics, so I have treated broadly with Zosyn. I discussed the case with  of the hospitalist services. We will admit the patient to a medical bed for continued evaluation and treatment.       Diagnosis:    ICD-10-CM    1. COPD exacerbation (H) J44.1    2. Pneumonia of right lower lobe due to infectious organism (H) J18.1        Disposition:  Admitted to a medical bed.     Scribe Disclosure:  Hayde MAYER, am serving as a scribe on 12/25/2017 at 2:44 PM to personally document services performed  by Neena Jacobsen MD based on my observations and the provider's statements to me.     Hayde Benavidez  12/25/2017   [facility] EMERGENCY DEPARTMENT       Neena Jacobsen MD  12/26/17 1105

## 2017-12-25 NOTE — ED NOTES
[facility]  ED Nurse Handoff Report    Emmanuel Clinton is a 72 year old male   ED Chief complaint: Shortness of Breath  . ED Diagnosis:   Final diagnoses:   None     Allergies: No Known Allergies    Code Status: Full Code  Activity level - Baseline/Home:  Independent. Activity Level - Current:   Stand with Assist. Lift room needed: No. Bariatric: No   Needed: No   Isolation: No. Infection: Not Applicable.     Vital Signs:   Vitals:    12/25/17 1634 12/25/17 1635 12/25/17 1636 12/25/17 1637   BP:       Resp:       Temp:       TempSrc:       SpO2: 100% 96% 99% 99%       Cardiac Rhythm:  ,      Pain level:    Patient confused: No. Patient Falls Risk: Yes.   Elimination Status: Has voided   Patient Report - Initial Complaint: Shortness of breath. Focused Assessment: Clear and equal bilaterally, work of breathing increased.   Tests Performed: Xray, Labs. Abnormal Results:   Labs Ordered and Resulted from Time of ED Arrival Up to the Time of Departure from the ED   CBC WITH PLATELETS DIFFERENTIAL - Abnormal; Notable for the following:        Result Value    RBC Count 4.14 (*)     MCH 33.3 (*)     Absolute Monocytes 1.8 (*)     All other components within normal limits   BASIC METABOLIC PANEL - Abnormal; Notable for the following:     Glucose 106 (*)     Creatinine 1.27 (*)     GFR Estimate 55 (*)     All other components within normal limits   TROPONIN I   PULSE OXIMETRY NURSING   PERIPHERAL IV CATHETER   CARDIAC CONTINUOUS MONITORING   INFLUENZA A/B ANTIGEN   BLOOD CULTURE   BLOOD CULTURE     XR Chest 2 Views   Preliminary Result   IMPRESSION: Subtle hazy right lower lobe opacity best seen on the   frontal view could represent asymmetric vascular congestion or   infiltrate. Unchanged blunting of the costophrenic angles. No   pneumothorax. Cardiac silhouette within normal limits.        .   Treatments provided: See MAR  Family Comments: Wife aware of admission, went home  OBS brochure/video  discussed/provided to patient:  N/A  ED Medications:   Medications   piperacillin-tazobactam (ZOSYN) intermittent infusion 4.5 g (not administered)   ipratropium - albuterol 0.5 mg/2.5 mg/3 mL (DUONEB) neb solution 3 mL (3 mLs Nebulization Given 12/25/17 1517)   methylPREDNISolone sodium succinate (solu-MEDROL) injection 125 mg (125 mg Intravenous Given 12/25/17 1507)   ipratropium - albuterol 0.5 mg/2.5 mg/3 mL (DUONEB) neb solution 3 mL (3 mLs Nebulization Given 12/25/17 1558)     Drips infusing:  Yes  For the majority of the shift, the patient's behavior Green. Interventions performed were See MAR.     Severe Sepsis OR Septic Shock Diagnosis Present: No      ED Nurse Name/Phone Number: Donald Quintero,   4:56 PM    RECEIVING UNIT ED HANDOFF REVIEW    Above ED Nurse Handoff Report was reviewed: Yes  Reviewed by: Adrian Yanes on December 25, 2017 at 6:08 PM

## 2017-12-25 NOTE — LETTER
Transition Communication Hand-off for Care Transitions to Next Level of Care Provider    Name: Emmanuel Clinton  MRN #: [id number]  Primary Care Provider: Nicolette Carlson  Primary Care MD Name: NP Nicolette Carlson  Primary Clinic: [facility] [address]  [facility] [address]  Primary Care [facility] Name: [facility]  Reason for Hospitalization:  COPD exacerbation (H) [J44.1]  Pneumonia of right lower lobe due to infectious organism (H) [J18.1]  Admit Date/Time: 12/25/2017  2:30 PM  Discharge Date: 12/30  Payor Source: Payor: MEDICA / Plan: MEDICA PRIME SOLUTION / Product Type: Indemnity /     Readmission Assessment Measure (MARYANA) Risk Score/category: average           Reason for Communication Hand-off Referral: Admission diagnoses: COPD    Discharge Plan: home       Concern for non-adherence with plan of care:   Y/N n  Discharge Needs Assessment:  Needs       Most Recent Value    Equipment Currently Used at Home cane, straight    Transportation Available family or friend will provide    # of Referrals Placed by [facility] External Care Coordination          Follow-up specialty is recommended: No    Follow-up plan:  No future appointments.    Any outstanding tests or procedures:              Key Recommendations:  Admitted with PNA and COPD exacerbation.  Failed outpatient treatment.  Is scheduled with [facility] on Feb 18th.  He is aware he should move this appt up if his symptoms return.  He still smokes 5 cigarettes a day.  He is aware of smoking cessation, but thinking of stopping by himself.  A1C 12/26/17 is 6.1.    Mojgan Hill    AVS/Discharge Summary is the source of truth; this is a helpful guide for improved communication of patient story

## 2017-12-26 LAB
ANION GAP SERPL CALCULATED.3IONS-SCNC: 10 MMOL/L (ref 3–14)
BASOPHILS # BLD AUTO: 0 10E9/L (ref 0–0.2)
BASOPHILS NFR BLD AUTO: 0 %
BUN SERPL-MCNC: 21 MG/DL (ref 7–30)
CALCIUM SERPL-MCNC: 8.6 MG/DL (ref 8.5–10.1)
CHLORIDE SERPL-SCNC: 103 MMOL/L (ref 94–109)
CO2 SERPL-SCNC: 23 MMOL/L (ref 20–32)
CREAT SERPL-MCNC: 1.21 MG/DL (ref 0.66–1.25)
D DIMER PPP FEU-MCNC: 0.3 UG/ML FEU (ref 0–0.5)
DIFFERENTIAL METHOD BLD: ABNORMAL
EOSINOPHIL # BLD AUTO: 0 10E9/L (ref 0–0.7)
EOSINOPHIL NFR BLD AUTO: 0 %
ERYTHROCYTE [DISTWIDTH] IN BLOOD BY AUTOMATED COUNT: 13 % (ref 10–15)
GFR SERPL CREATININE-BSD FRML MDRD: 59 ML/MIN/1.7M2
GLUCOSE BLDC GLUCOMTR-MCNC: 278 MG/DL (ref 70–99)
GLUCOSE BLDC GLUCOMTR-MCNC: 346 MG/DL (ref 70–99)
GLUCOSE SERPL-MCNC: 250 MG/DL (ref 70–99)
HBA1C MFR BLD: 6.1 % (ref 4.3–6)
HCT VFR BLD AUTO: 36.3 % (ref 40–53)
HGB BLD-MCNC: 12.2 G/DL (ref 13.3–17.7)
IMM GRANULOCYTES # BLD: 0.1 10E9/L (ref 0–0.4)
IMM GRANULOCYTES NFR BLD: 1.2 %
INTERPRETATION ECG - MUSE: NORMAL
LYMPHOCYTES # BLD AUTO: 0.6 10E9/L (ref 0.8–5.3)
LYMPHOCYTES NFR BLD AUTO: 7.8 %
MCH RBC QN AUTO: 33.3 PG (ref 26.5–33)
MCHC RBC AUTO-ENTMCNC: 33.6 G/DL (ref 31.5–36.5)
MCV RBC AUTO: 99 FL (ref 78–100)
MONOCYTES # BLD AUTO: 0.1 10E9/L (ref 0–1.3)
MONOCYTES NFR BLD AUTO: 1.1 %
NEUTROPHILS # BLD AUTO: 7.2 10E9/L (ref 1.6–8.3)
NEUTROPHILS NFR BLD AUTO: 89.9 %
NRBC # BLD AUTO: 0 10*3/UL
NRBC BLD AUTO-RTO: 0 /100
PLATELET # BLD AUTO: 192 10E9/L (ref 150–450)
POTASSIUM SERPL-SCNC: 5.1 MMOL/L (ref 3.4–5.3)
PROCALCITONIN SERPL-MCNC: 0.1 NG/ML
RBC # BLD AUTO: 3.66 10E12/L (ref 4.4–5.9)
SODIUM SERPL-SCNC: 136 MMOL/L (ref 133–144)
WBC # BLD AUTO: 8 10E9/L (ref 4–11)

## 2017-12-26 PROCEDURE — 25000132 ZZH RX MED GY IP 250 OP 250 PS 637: Mod: GY | Performed by: INTERNAL MEDICINE

## 2017-12-26 PROCEDURE — 85379 FIBRIN DEGRADATION QUANT: CPT | Performed by: INTERNAL MEDICINE

## 2017-12-26 PROCEDURE — 36415 COLL VENOUS BLD VENIPUNCTURE: CPT | Performed by: INTERNAL MEDICINE

## 2017-12-26 PROCEDURE — 25000125 ZZHC RX 250: Performed by: INTERNAL MEDICINE

## 2017-12-26 PROCEDURE — 83036 HEMOGLOBIN GLYCOSYLATED A1C: CPT | Performed by: INTERNAL MEDICINE

## 2017-12-26 PROCEDURE — 00000146 ZZHCL STATISTIC GLUCOSE BY METER IP

## 2017-12-26 PROCEDURE — 12000000 ZZH R&B MED SURG/OB

## 2017-12-26 PROCEDURE — 94640 AIRWAY INHALATION TREATMENT: CPT | Mod: 76

## 2017-12-26 PROCEDURE — A9270 NON-COVERED ITEM OR SERVICE: HCPCS | Mod: GY | Performed by: INTERNAL MEDICINE

## 2017-12-26 PROCEDURE — 85025 COMPLETE CBC W/AUTO DIFF WBC: CPT | Performed by: INTERNAL MEDICINE

## 2017-12-26 PROCEDURE — 94640 AIRWAY INHALATION TREATMENT: CPT

## 2017-12-26 PROCEDURE — 99233 SBSQ HOSP IP/OBS HIGH 50: CPT | Performed by: INTERNAL MEDICINE

## 2017-12-26 PROCEDURE — 25000131 ZZH RX MED GY IP 250 OP 636 PS 637: Mod: GY | Performed by: INTERNAL MEDICINE

## 2017-12-26 PROCEDURE — 80048 BASIC METABOLIC PNL TOTAL CA: CPT | Performed by: INTERNAL MEDICINE

## 2017-12-26 PROCEDURE — 40000275 ZZH STATISTIC RCP TIME EA 10 MIN

## 2017-12-26 PROCEDURE — 84145 PROCALCITONIN (PCT): CPT | Performed by: INTERNAL MEDICINE

## 2017-12-26 PROCEDURE — 25000128 H RX IP 250 OP 636: Performed by: INTERNAL MEDICINE

## 2017-12-26 RX ORDER — NICOTINE POLACRILEX 4 MG
15-30 LOZENGE BUCCAL
Status: DISCONTINUED | OUTPATIENT
Start: 2017-12-26 | End: 2017-12-30 | Stop reason: HOSPADM

## 2017-12-26 RX ORDER — DEXTROSE MONOHYDRATE 25 G/50ML
25-50 INJECTION, SOLUTION INTRAVENOUS
Status: DISCONTINUED | OUTPATIENT
Start: 2017-12-26 | End: 2017-12-30 | Stop reason: HOSPADM

## 2017-12-26 RX ORDER — HYDROXYZINE HYDROCHLORIDE 25 MG/1
25 TABLET, FILM COATED ORAL EVERY 6 HOURS PRN
Status: DISCONTINUED | OUTPATIENT
Start: 2017-12-26 | End: 2017-12-30 | Stop reason: HOSPADM

## 2017-12-26 RX ADMIN — PRAVASTATIN SODIUM 80 MG: 20 TABLET ORAL at 20:06

## 2017-12-26 RX ADMIN — HYDROCHLOROTHIAZIDE 12.5 MG: 12.5 CAPSULE ORAL at 08:07

## 2017-12-26 RX ADMIN — INSULIN ASPART 5 UNITS: 100 INJECTION, SOLUTION INTRAVENOUS; SUBCUTANEOUS at 18:40

## 2017-12-26 RX ADMIN — IPRATROPIUM BROMIDE AND ALBUTEROL SULFATE 3 ML: .5; 3 SOLUTION RESPIRATORY (INHALATION) at 11:12

## 2017-12-26 RX ADMIN — VITAMIN D, TAB 1000IU (100/BT) 1000 UNITS: 25 TAB at 08:08

## 2017-12-26 RX ADMIN — METOPROLOL SUCCINATE 100 MG: 100 TABLET, EXTENDED RELEASE ORAL at 08:07

## 2017-12-26 RX ADMIN — TRAZODONE HYDROCHLORIDE 50 MG: 50 TABLET ORAL at 00:50

## 2017-12-26 RX ADMIN — CLOPIDOGREL 75 MG: 75 TABLET, FILM COATED ORAL at 08:08

## 2017-12-26 RX ADMIN — LEVOFLOXACIN 750 MG: 500 TABLET, FILM COATED ORAL at 20:06

## 2017-12-26 RX ADMIN — IPRATROPIUM BROMIDE AND ALBUTEROL SULFATE 3 ML: .5; 3 SOLUTION RESPIRATORY (INHALATION) at 07:32

## 2017-12-26 RX ADMIN — PREDNISONE 60 MG: 10 TABLET ORAL at 13:16

## 2017-12-26 RX ADMIN — METHYLPREDNISOLONE SODIUM SUCCINATE 62.5 MG: 125 INJECTION, POWDER, FOR SOLUTION INTRAMUSCULAR; INTRAVENOUS at 06:19

## 2017-12-26 RX ADMIN — IPRATROPIUM BROMIDE AND ALBUTEROL SULFATE 3 ML: .5; 3 SOLUTION RESPIRATORY (INHALATION) at 15:41

## 2017-12-26 RX ADMIN — IPRATROPIUM BROMIDE AND ALBUTEROL SULFATE 3 ML: .5; 3 SOLUTION RESPIRATORY (INHALATION) at 19:17

## 2017-12-26 ASSESSMENT — ACTIVITIES OF DAILY LIVING (ADL)
ADLS_ACUITY_SCORE: 15

## 2017-12-26 NOTE — PLAN OF CARE
Problem: Patient Care Overview  Goal: Plan of Care/Patient Progress Review  Outcome: No Change  Pt admitted this evening for COPD exacerbation + PNA. On 1L O2. Tx; scheduled nebs, PO levaquin, IV steroids. Potassium and Mag replaced this evening, re-check labs order for AM. Regular diet. Up SBA. Discharge TBD.

## 2017-12-26 NOTE — PROGRESS NOTES
RT Note:    Date: 12/25/2017  Admission Dx: COPD exacerbation, pneumonia  Pulmonary hx: COPD  Home nebulizer/MDI: Alb 2 puffs Q6 PRN  Home oxygen: none  Acuity level (RCAT flow sheet): 3  Aerosol therapy initiated: Duoneb QID, Alb Q2 PRN  Pulmonary hygiene initiated: Deep breathe and cough TID  Volume expansion initiated: Incentive spirometry TID  Current oxygen requirements: 2 LPM NC  Current spo2: 95%  Re-evaluation date: 12/28/2017  Patient education: encourage to deep breathe and cough    Ana Bach, RRT

## 2017-12-26 NOTE — PROGRESS NOTES
[facility]  Hospitalist Progress Note  Christiano Okeefe, DO 12/26/2017    Reason for Stay (Diagnosis): Pneumonia         Assessment and Plan:      Summary of Stay: Emmanuel Clinton is a 72 year old male admitted on 12/25/2017 with pneumonia    Problem List:   1. Pneumonia.  Continue Levaquin.  Checked procalcitonin level, and low.  Check CT chest.  Would recommend following up with Pulmonologist as outpatient.  2. COPD exacerbation.  Change IV Solu Medrol to oral Prednisone 60 mg/day.  Continue Duonebs, Levaquin.  Albuterol PRN.  3. Hyperglycemia.  No known Diabetes.  Check HgA1c.  Start Novolog SSI.  4. HTN.  Continue Metoprolol, HCTZ.  5. Hyperlipidemia.  Pravachol.  6. Chronic kidney disease.  Creatinine 1.2.  Near baseline.  Stop IV fluids.  Avoid nephrotoxins as able.    7. Previous stroke.  Continue Plavix, Pravachol.  8. Deconditioning.  PT consult.  DVT Prophylaxis: Pneumatic Compression Devices  Code Status: Full Code  Discharge Dispo: Home  Estimated Disch Date / # of Days until Disch: 1-2        Interval History (Subjective):      Short of breath.  Coughing.  No CP, F/C, N/V, or diarrhea.                  Physical Exam:      Last Vital Signs:  /60  Temp 96.4  F (35.8  C) (Oral)  Resp 16  Ht 1.829 m (6')  Wt 82.9 kg (182 lb 12.2 oz)  SpO2 96%  BMI 24.79 kg/m2    Gen:  NAD, A&Ox3.  Eyes:  PERRL, sclera anicteric.  OP:  MMM, no lesions.  Neck:  Supple.  CV:  Regular, no murmurs.  Lung:  Wheeze b/l, normal effort.  Ab:  +BS, soft.  Skin:  Warm, dry to touch.  No rash.  Ext:  No pitting edema LE b/l.           Medications:      All current medications were reviewed with changes reflected in problem list.         Data:      All new lab and imaging data was reviewed.   Labs:    Recent Labs  Lab 12/26/17  0820      POTASSIUM 5.1   CHLORIDE 103   CO2 23   ANIONGAP 10   *   BUN 21   CR 1.21   GFRESTIMATED 59*   GFRESTBLACK 71   DANIEL 8.6       Recent Labs  Lab 12/26/17  0820    WBC 8.0   HGB 12.2*   HCT 36.3*   MCV 99         Imaging:   Recent Results (from the past 24 hour(s))   XR Chest 2 Views    Narrative    CHEST TWO VIEWS    12/25/2017 4:21 PM     HISTORY: Chest pain.     COMPARISON: None.      Impression    IMPRESSION: Subtle hazy right lower lobe opacity best seen on the  frontal view could represent asymmetric vascular congestion or  infiltrate. Unchanged blunting of the costophrenic angles. No  pneumothorax. Cardiac silhouette within normal limits.    FARZANA CURIEL MD

## 2017-12-26 NOTE — CONSULTS
Care Transition Initial Assessment - RN    Reason For Consult: care coordination/care conference, discharge planning   Met with: Patient.    DATA   Active Problems:    COPD exacerbation (H)    Pneumonia due to infectious organism    COPD with acute exacerbation (H)       Cognitive Status: awake, alert and oriented.  [facility]: [facility]  Primary Care MD Name: DEENA Carlson  Contact information and PCP information verified: Yes    Lives With: spouse        Description of Support System: Supportive   Who is your support system?: Wife         Insurance concerns: No Insurance issues identified    ASSESSMENT  Patient currently receives the following services:  none        Identified issues/concerns regarding health management: Pt is admitted with PNA and COPD exacerbation.  He did fail outpt treatment.  Pt follows with DEENA Will with [facility].  He does have a referral to Pulmonary with [facility] in [place] on February 18th.  He did schedule this appointment awhile ago when he was feeling well.  He is aware he should move it up to a closer date if he starts feeling poorly again.  He scheduled it this day d/t his work schedule.  He works 2-3x a week still.  He does still smoke around 5 cigarettes a day.  He is debating on quitting completely.  He is aware of smoking cessation resources.  He is aware of a1c test.  Will give hand off to [facility] CTS at time of dc.  Pt given PNA and COPD action care plan.  He will share with his wife.      PLAN  Patient given options and choices for discharge YES .  Patient/family is agreeable to the plan?  Yes:   Patient anticipates discharging to home with wife .        Patient anticipates needs for home equipment: No  Discharge Planner   Discharge Plans in progress: YES  Barriers to discharge plan: none  Plan/Disposition: Home   Appointments: Pulmonary follow up scheduled        Care  (CTS) will continue to follow as needed.    Cherelle WILLARD CTS  [phone]

## 2017-12-26 NOTE — PLAN OF CARE
Problem: Patient Care Overview  Goal: Plan of Care/Patient Progress Review  Outcome: No Change  Ambulatory Status:  Pt up Independently in Room.  VS:  VSS  Pain:  No c/o  Resp: LS Diminished with expiratory wheezes - at times wearing 1L O2 via NC for comfort   GI:  Denies nausea.  Fair appetite and on regular diet.  BS Active.  Passing flatus.  Last BM 12/24/2017.  :  Voiding adequately.  Skin:  Pink rashes from eczema.   Tx:  PO Prednisone, BG checks start at dinner, PO Levaquin and scheduled nebs\  Disposition:  TBD 1-2 days?

## 2017-12-26 NOTE — PLAN OF CARE
Problem: Patient Care Overview  Goal: Plan of Care/Patient Progress Review  Outcome: No Change  VSS, on 1L O2. Lungs dim with exp wheezing at times, declines prn neb.  Infrequent, nonproductive cough, declines prn robitussin.  On scheduled nebs, Iv solumedrol, and po levaquin.  Up SBA with cane.  Will monitor.

## 2017-12-27 ENCOUNTER — APPOINTMENT (OUTPATIENT)
Dept: CT IMAGING | Facility: CLINIC | Age: 72
DRG: 178 | End: 2017-12-27
Attending: INTERNAL MEDICINE
Payer: MEDICARE

## 2017-12-27 ENCOUNTER — APPOINTMENT (OUTPATIENT)
Dept: PHYSICAL THERAPY | Facility: CLINIC | Age: 72
DRG: 178 | End: 2017-12-27
Attending: INTERNAL MEDICINE
Payer: MEDICARE

## 2017-12-27 LAB
ANION GAP SERPL CALCULATED.3IONS-SCNC: 8 MMOL/L (ref 3–14)
BUN SERPL-MCNC: 26 MG/DL (ref 7–30)
CALCIUM SERPL-MCNC: 8.8 MG/DL (ref 8.5–10.1)
CHLORIDE SERPL-SCNC: 102 MMOL/L (ref 94–109)
CO2 SERPL-SCNC: 25 MMOL/L (ref 20–32)
CREAT SERPL-MCNC: 1.15 MG/DL (ref 0.66–1.25)
GFR SERPL CREATININE-BSD FRML MDRD: 62 ML/MIN/1.7M2
GLUCOSE BLDC GLUCOMTR-MCNC: 125 MG/DL (ref 70–99)
GLUCOSE BLDC GLUCOMTR-MCNC: 141 MG/DL (ref 70–99)
GLUCOSE BLDC GLUCOMTR-MCNC: 158 MG/DL (ref 70–99)
GLUCOSE BLDC GLUCOMTR-MCNC: 159 MG/DL (ref 70–99)
GLUCOSE BLDC GLUCOMTR-MCNC: 227 MG/DL (ref 70–99)
GLUCOSE SERPL-MCNC: 178 MG/DL (ref 70–99)
POTASSIUM SERPL-SCNC: 4.2 MMOL/L (ref 3.4–5.3)
SODIUM SERPL-SCNC: 135 MMOL/L (ref 133–144)

## 2017-12-27 PROCEDURE — 25000125 ZZHC RX 250: Performed by: INTERNAL MEDICINE

## 2017-12-27 PROCEDURE — 80048 BASIC METABOLIC PNL TOTAL CA: CPT | Performed by: INTERNAL MEDICINE

## 2017-12-27 PROCEDURE — 25000131 ZZH RX MED GY IP 250 OP 636 PS 637: Mod: GY | Performed by: INTERNAL MEDICINE

## 2017-12-27 PROCEDURE — 25000132 ZZH RX MED GY IP 250 OP 250 PS 637: Mod: GY | Performed by: INTERNAL MEDICINE

## 2017-12-27 PROCEDURE — A9270 NON-COVERED ITEM OR SERVICE: HCPCS | Mod: GY | Performed by: INTERNAL MEDICINE

## 2017-12-27 PROCEDURE — 99233 SBSQ HOSP IP/OBS HIGH 50: CPT | Performed by: INTERNAL MEDICINE

## 2017-12-27 PROCEDURE — 94640 AIRWAY INHALATION TREATMENT: CPT | Mod: 76

## 2017-12-27 PROCEDURE — 40000275 ZZH STATISTIC RCP TIME EA 10 MIN

## 2017-12-27 PROCEDURE — 94640 AIRWAY INHALATION TREATMENT: CPT

## 2017-12-27 PROCEDURE — 97110 THERAPEUTIC EXERCISES: CPT | Mod: GP | Performed by: PHYSICAL THERAPIST

## 2017-12-27 PROCEDURE — 12000000 ZZH R&B MED SURG/OB

## 2017-12-27 PROCEDURE — 71250 CT THORAX DX C-: CPT

## 2017-12-27 PROCEDURE — 36415 COLL VENOUS BLD VENIPUNCTURE: CPT | Performed by: INTERNAL MEDICINE

## 2017-12-27 PROCEDURE — 97530 THERAPEUTIC ACTIVITIES: CPT | Mod: GP | Performed by: PHYSICAL THERAPIST

## 2017-12-27 PROCEDURE — 97161 PT EVAL LOW COMPLEX 20 MIN: CPT | Mod: GP | Performed by: PHYSICAL THERAPIST

## 2017-12-27 PROCEDURE — 00000146 ZZHCL STATISTIC GLUCOSE BY METER IP

## 2017-12-27 PROCEDURE — 40000193 ZZH STATISTIC PT WARD VISIT: Performed by: PHYSICAL THERAPIST

## 2017-12-27 RX ORDER — ACETAMINOPHEN 325 MG/1
650 TABLET ORAL EVERY 4 HOURS PRN
Status: DISCONTINUED | OUTPATIENT
Start: 2017-12-27 | End: 2017-12-30 | Stop reason: HOSPADM

## 2017-12-27 RX ORDER — ACETAMINOPHEN 650 MG/1
650 SUPPOSITORY RECTAL EVERY 4 HOURS PRN
Status: DISCONTINUED | OUTPATIENT
Start: 2017-12-27 | End: 2017-12-30 | Stop reason: HOSPADM

## 2017-12-27 RX ADMIN — METOPROLOL SUCCINATE 100 MG: 100 TABLET, EXTENDED RELEASE ORAL at 08:00

## 2017-12-27 RX ADMIN — IPRATROPIUM BROMIDE AND ALBUTEROL SULFATE 3 ML: .5; 3 SOLUTION RESPIRATORY (INHALATION) at 19:56

## 2017-12-27 RX ADMIN — IPRATROPIUM BROMIDE AND ALBUTEROL SULFATE 3 ML: .5; 3 SOLUTION RESPIRATORY (INHALATION) at 12:05

## 2017-12-27 RX ADMIN — PREDNISONE 60 MG: 10 TABLET ORAL at 08:00

## 2017-12-27 RX ADMIN — LEVOFLOXACIN 750 MG: 500 TABLET, FILM COATED ORAL at 19:19

## 2017-12-27 RX ADMIN — CLOPIDOGREL 75 MG: 75 TABLET, FILM COATED ORAL at 08:01

## 2017-12-27 RX ADMIN — INSULIN ASPART 1 UNITS: 100 INJECTION, SOLUTION INTRAVENOUS; SUBCUTANEOUS at 07:32

## 2017-12-27 RX ADMIN — VITAMIN D, TAB 1000IU (100/BT) 1000 UNITS: 25 TAB at 08:00

## 2017-12-27 RX ADMIN — PRAVASTATIN SODIUM 80 MG: 20 TABLET ORAL at 19:19

## 2017-12-27 RX ADMIN — INSULIN ASPART 1 UNITS: 100 INJECTION, SOLUTION INTRAVENOUS; SUBCUTANEOUS at 14:21

## 2017-12-27 RX ADMIN — HYDROCHLOROTHIAZIDE 12.5 MG: 12.5 CAPSULE ORAL at 08:01

## 2017-12-27 RX ADMIN — IPRATROPIUM BROMIDE AND ALBUTEROL SULFATE 3 ML: .5; 3 SOLUTION RESPIRATORY (INHALATION) at 08:03

## 2017-12-27 ASSESSMENT — ACTIVITIES OF DAILY LIVING (ADL)
ADLS_ACUITY_SCORE: 15
ADLS_ACUITY_SCORE: 15
ADLS_ACUITY_SCORE: 10
ADLS_ACUITY_SCORE: 15
ADLS_ACUITY_SCORE: 15
ADLS_ACUITY_SCORE: 10

## 2017-12-27 NOTE — PLAN OF CARE
Problem: Patient Care Overview  Goal: Plan of Care/Patient Progress Review  Outcome: No Change  Neuro: A&O  Lungs: clear, diminished, expiratory wheezes at times, room air  IV: Saline locked  Labs: labs in AM, BG checks 227 at 0200   GI: passing flatus, active x4, no BM overnight  : voiding  Diet: Regular, tolerating well  Pain: denies  Activity: independent with cane  Plan: continue to monitor BG levels, PO prednisone and levaquin. Plans to d/c home 1-2 with wife when stable.

## 2017-12-27 NOTE — PLAN OF CARE
Problem: Patient Care Overview  Goal: Plan of Care/Patient Progress Review  Outcome: Improving  Pt remains hospitalized for: COPD exac./PNA   Ambulatory Status:  Pt up ind.  Orientation: a/o  VS:  VSS-weaned off O2  Pain:  denies  Resp: LS dim. Cough infrequent/non productive  GI:  denies nausea.  good appetite, on regular diet. +BS.  Passing flatus.  Last BM 12/24.  :  Voiding without difficulty   Tx:  Levaquin and prednisone   Labs:   and 278  Consults:  PT  Disposition:  TBD

## 2017-12-27 NOTE — PLAN OF CARE
Problem: Patient Care Overview  Goal: Plan of Care/Patient Progress Review    PT:  Eval and treatment complete.  Pt admitted with PNA and COPD exacerbation.  Pt lives with spouse in a single level home, functions independently with use of SEC at baseline.  Pt still works part-time, stays fairly active at baseline.    Discharge Planner PT   Patient plan for discharge: home once stable  Current status: Ed pt on activity progression, PLB, postural considerations to facilitate improved pulmonary function and activity tolerance.  Amb hallways independently with SEC, mild dynamic balance issues at baseline.  Ed pt on generalized strengthening HEP, tolerating well.  SaO2 97% at rest on 2L nc, 90-93% on RA with activity, 95% at rest after session on RA.  Encouraged IS use hourly.  Walk hallways 4x/day.  No further skilled PT needs, goals met, d/c.  Barriers to return to prior living situation: none  Recommendations for discharge: home  Rationale for recommendations: all goals met, safe mobility tolerance for d/c home when medically stable.  Will benefit from ongoing HEP and walking program to regain strength and activity tolerance.       Entered by: Srinivasa Bach 12/27/2017 1:23 PM     Physical Therapy Discharge Summary    Reason for therapy discharge:    All goals and outcomes met, no further needs identified.    Progress towards therapy goal(s). See goals on Care Plan in [facility] electronic health record for goal details.  Goals met    Therapy recommendation(s):    Continue home exercise program.

## 2017-12-27 NOTE — PLAN OF CARE
Problem: Patient Care Overview  Goal: Plan of Care/Patient Progress Review  Outcome: No Change  Pt admitted with PNA and COPD exacerbation.  Mid 90's on room air, but felt SOB and labored breathing, requested oxygen for comfort.  Lungs diminshed, some expiratory wheezes. Ambulates independently.   Continue nebs, prednisone, and Levaquin.  Discharge next 1-2 days.

## 2017-12-27 NOTE — PROGRESS NOTES
[facility]  Hospitalist Progress Note  Christiano Okeefe, DO 12/27/2017    Reason for Stay (Diagnosis): Pneumonia         Assessment and Plan:      Summary of Stay: Emmanuel Clinton is a 72 year old male admitted on 12/25/2017 with pneumonia    Problem List:   1. Pneumonia.  Continue Levaquin.  Checked procalcitonin level, and low.  Check CT chest with out contrast.  Would recommend following up with Pulmonologist as outpatient.  2. COPD exacerbation.  Continue Prednisone 60 mg/day.  Continue Duonebs, Levaquin.  Albuterol PRN.  3. Hyperglycemia.  No known Diabetes.  HgA1c 6.1.  Start NPH 10 units every morning while on steroids.  Novolog SSI.  4. HTN.  Continue Metoprolol, HCTZ.  5. Hyperlipidemia.  Pravachol.  6. Chronic kidney disease.  Creatinine 1.15.  Near baseline.  Avoid nephrotoxins as able.    7. Previous stroke.  Continue Plavix, Pravachol.  8. Deconditioning.  PT consult.  DVT Prophylaxis: Pneumatic Compression Devices  Code Status: Full Code  Discharge Dispo: Home  Estimated Disch Date / # of Days until Disch: 1-2           Interval History (Subjective):      Short of breath.  Coughing.  Weak.  Denies CP, F/C, N/V, or diarrhea.                  Physical Exam:      Last Vital Signs:  /54 (BP Location: Left arm)  Temp 95.2  F (35.1  C) (Oral)  Resp 16  Ht 1.829 m (6')  Wt 83.2 kg (183 lb 8 oz)  SpO2 96%  BMI 24.89 kg/m2    Gen:  NAD, A&Ox3.  Eyes:  PERRL, sclera anicteric.  OP:  MMM, no lesions.  Neck:  Supple.  CV:  Regular, no murmurs.  Lung:  Wheeze b/l, normal effort.  Ab:  +BS, soft.  Skin:  Warm, dry to touch.  No rash.  Ext:  No pitting edema LE b/l.           Medications:      All current medications were reviewed with changes reflected in problem list.         Data:      All new lab and imaging data was reviewed.   Labs:    Recent Labs  Lab 12/27/17  0754      POTASSIUM 4.2   CHLORIDE 102   CO2 25   ANIONGAP 8   *   BUN 26   CR 1.15   GFRESTIMATED 62    GFRESTBLACK 75   DANIEL 8.8       Recent Labs  Lab 12/26/17  0820   WBC 8.0   HGB 12.2*   HCT 36.3*   MCV 99         Imaging:   No results found for this or any previous visit (from the past 24 hour(s)).

## 2017-12-27 NOTE — PROGRESS NOTES
12/27/17 1311   Quick Adds   Type of Visit Initial PT Evaluation   Living Environment   Lives With spouse   Living Arrangements house   Home Accessibility stairs to enter home;stairs within home   Number of Stairs to Enter Home 1   Number of Stairs Within Home 12  (lower level laundry)   Transportation Available family or friend will provide   Living Environment Comment main floor bed/bath   Self-Care   Dominant Hand right   Usual Activity Tolerance good   Current Activity Tolerance moderate   Regular Exercise no   Equipment Currently Used at Home cane, straight   Activity/Exercise/Self-Care Comment Indep PLOF, uses SEC.  Works part time yet, stays relatively active.   Functional Level Prior   Ambulation 1-->assistive equipment   Transferring 0-->independent   Toileting 0-->independent   Bathing 0-->independent   Dressing 0-->independent   Eating 0-->independent   Communication 0-->understands/communicates without difficulty   Swallowing 0-->swallows foods/liquids without difficulty   Cognition 0 - no cognition issues reported   Fall history within last six months yes   Number of times patient has fallen within last six months 1   Which of the above functional risks had a recent onset or change? ambulation;fall history   General Information   Onset of Illness/Injury or Date of Surgery - Date 12/26/17   Referring Physician Christiano Okeefe D, DO   Patient/Family Goals Statement d/c home when medically stable, plans to vacation to [place] next month   Pertinent History of Current Problem (include personal factors and/or comorbidities that impact the POC) Pt admitted with PNA and COPD exacerbation.  Hx of HTN and previous stroke.  He reports he uses a SEC due to R sided impairment, mild balance issues at baseline.  No home O2 use.   Precautions/Limitations oxygen therapy device and L/min  (currently on 2L nc O2)   General Observations pt awake and alert, SaO2 97% on 2L nc at rest.   Cognitive Status Examination  "  Orientation orientation to person, place and time   Pain Assessment   Patient Currently in Pain No   Integumentary/Edema   Integumentary/Edema no deficits were identifed   Posture    Posture Forward head position   Range of Motion (ROM)   ROM Comment WNL BLEs and BUEs   Strength   Strength Comments WNL with evidence of mild proximal hip weakness/deconditioning consistent with current illness and relative bedrest.   Bed Mobility   Bed Mobility Comments Indep   Transfer Skills   Transfer Comments Indep, steady on feet.   Gait   Gait Comments Amb with SEC SBA in room, mild dynamic balance impairment noted which is his baseline.  Reciprocal gait pattern, normal gait speed.   Balance   Balance Comments NBOS EO and EC x 15 sec with mild sway, mild dynamic gait imbalance which is his baseline, compensates well with SEC.   Sensory Examination   Sensory Perception no deficits were identified   General Therapy Interventions   Planned Therapy Interventions gait training;strengthening;risk factor education;home program guidelines;progressive activity/exercise   Clinical Impression   Criteria for Skilled Therapeutic Intervention yes, treatment indicated   PT Diagnosis Impaired functional activity tolerance   Influenced by the following impairments Impaired strength, balance, respiratory status   Functional limitations due to impairments Impaired tolerance with community amb distances, stairs, functional activity and ADLs   Clinical Presentation Stable/Uncomplicated   Clinical Presentation Rationale improving medical status, PLOF, PMH   Clinical Decision Making (Complexity) Low complexity   Therapy Frequency` daily   Predicted Duration of Therapy Intervention (days/wks) 1 day   Anticipated Discharge Disposition Home   Risk & Benefits of therapy have been explained Yes   Patient, Family & other staff in agreement with plan of care Yes   [facility] AM-PAC TM \"6 Clicks\"   2016, Trustees of [facility], under license " "to [employer].  All rights reserved.   6 Clicks Short Forms Basic Mobility Inpatient Short Form   [facility] AM-PAC  \"6 Clicks\" V.2 Basic Mobility Inpatient Short Form   1. Turning from your back to your side while in a flat bed without using bedrails? 4 - None   2. Moving from lying on your back to sitting on the side of a flat bed without using bedrails? 4 - None   3. Moving to and from a bed to a chair (including a wheelchair)? 4 - None   4. Standing up from a chair using your arms (e.g., wheelchair, or bedside chair)? 4 - None   5. To walk in hospital room? 4 - None   6. Climbing 3-5 steps with a railing? 4 - None   Basic Mobility Raw Score (Score out of 24.Lower scores equate to lower levels of function) 24   Total Evaluation Time   Total Evaluation Time (Minutes) 15     "

## 2017-12-27 NOTE — PROGRESS NOTES
Dr. Okeefe:Radiologist called with CT result. Small R pneumothorax. Denies SOB, O2 97% on RA      12/27/17 1700   Significant Event   Significant Event Other (see comments)  (CT results: R pneumothorax )

## 2017-12-28 ENCOUNTER — APPOINTMENT (OUTPATIENT)
Dept: GENERAL RADIOLOGY | Facility: CLINIC | Age: 72
DRG: 178 | End: 2017-12-28
Attending: INTERNAL MEDICINE
Payer: MEDICARE

## 2017-12-28 LAB
GLUCOSE BLDC GLUCOMTR-MCNC: 106 MG/DL (ref 70–99)
GLUCOSE BLDC GLUCOMTR-MCNC: 126 MG/DL (ref 70–99)
GLUCOSE BLDC GLUCOMTR-MCNC: 136 MG/DL (ref 70–99)
GLUCOSE BLDC GLUCOMTR-MCNC: 137 MG/DL (ref 70–99)
GLUCOSE BLDC GLUCOMTR-MCNC: 234 MG/DL (ref 70–99)

## 2017-12-28 PROCEDURE — 12000000 ZZH R&B MED SURG/OB

## 2017-12-28 PROCEDURE — 25000125 ZZHC RX 250: Performed by: INTERNAL MEDICINE

## 2017-12-28 PROCEDURE — 25000131 ZZH RX MED GY IP 250 OP 636 PS 637: Mod: GY | Performed by: INTERNAL MEDICINE

## 2017-12-28 PROCEDURE — 94640 AIRWAY INHALATION TREATMENT: CPT

## 2017-12-28 PROCEDURE — 71020 XR CHEST 2 VW: CPT

## 2017-12-28 PROCEDURE — 25000132 ZZH RX MED GY IP 250 OP 250 PS 637: Mod: GY | Performed by: INTERNAL MEDICINE

## 2017-12-28 PROCEDURE — 99232 SBSQ HOSP IP/OBS MODERATE 35: CPT | Performed by: INTERNAL MEDICINE

## 2017-12-28 PROCEDURE — 40000275 ZZH STATISTIC RCP TIME EA 10 MIN

## 2017-12-28 PROCEDURE — A9270 NON-COVERED ITEM OR SERVICE: HCPCS | Mod: GY | Performed by: INTERNAL MEDICINE

## 2017-12-28 PROCEDURE — 00000146 ZZHCL STATISTIC GLUCOSE BY METER IP

## 2017-12-28 PROCEDURE — 94640 AIRWAY INHALATION TREATMENT: CPT | Mod: 76

## 2017-12-28 RX ORDER — PREDNISONE 20 MG/1
40 TABLET ORAL DAILY
Status: DISCONTINUED | OUTPATIENT
Start: 2017-12-29 | End: 2017-12-30 | Stop reason: HOSPADM

## 2017-12-28 RX ADMIN — CLOPIDOGREL 75 MG: 75 TABLET, FILM COATED ORAL at 08:13

## 2017-12-28 RX ADMIN — IPRATROPIUM BROMIDE AND ALBUTEROL SULFATE 3 ML: .5; 3 SOLUTION RESPIRATORY (INHALATION) at 07:51

## 2017-12-28 RX ADMIN — INSULIN HUMAN 10 UNITS: 100 INJECTION, SUSPENSION SUBCUTANEOUS at 08:12

## 2017-12-28 RX ADMIN — IPRATROPIUM BROMIDE AND ALBUTEROL SULFATE 3 ML: .5; 3 SOLUTION RESPIRATORY (INHALATION) at 15:30

## 2017-12-28 RX ADMIN — PREDNISONE 60 MG: 10 TABLET ORAL at 08:13

## 2017-12-28 RX ADMIN — HYDROCHLOROTHIAZIDE 12.5 MG: 12.5 CAPSULE ORAL at 08:13

## 2017-12-28 RX ADMIN — IPRATROPIUM BROMIDE AND ALBUTEROL SULFATE 3 ML: .5; 3 SOLUTION RESPIRATORY (INHALATION) at 11:45

## 2017-12-28 RX ADMIN — METOPROLOL SUCCINATE 100 MG: 100 TABLET, EXTENDED RELEASE ORAL at 08:13

## 2017-12-28 RX ADMIN — GUAIFENESIN AND CODEINE PHOSPHATE 5 ML: 10; 100 LIQUID ORAL at 19:07

## 2017-12-28 RX ADMIN — VITAMIN D, TAB 1000IU (100/BT) 1000 UNITS: 25 TAB at 08:13

## 2017-12-28 RX ADMIN — PRAVASTATIN SODIUM 80 MG: 20 TABLET ORAL at 19:04

## 2017-12-28 RX ADMIN — LEVOFLOXACIN 750 MG: 500 TABLET, FILM COATED ORAL at 19:04

## 2017-12-28 RX ADMIN — IPRATROPIUM BROMIDE AND ALBUTEROL SULFATE 3 ML: .5; 3 SOLUTION RESPIRATORY (INHALATION) at 19:42

## 2017-12-28 ASSESSMENT — ACTIVITIES OF DAILY LIVING (ADL)
ADLS_ACUITY_SCORE: 10
ADLS_ACUITY_SCORE: 12

## 2017-12-28 NOTE — PLAN OF CARE
Problem: PT General Care Plan  Goal: PT target date for goal attainment  Outcome: No Change  Pt continues to be hospitalized for PNA. VSS. RA. Frequent, productive cough. BG stable. Tx; PO levaquin + prednisone. Had repeat chest x-ray this afternoon. Up independently. Showered today. Discharge possible tomorrow.

## 2017-12-28 NOTE — PROGRESS NOTES
[facility]  Hospitalist Progress Note  Christiano Okeefe, DO 12/28/2017    Reason for Stay (Diagnosis): Pneumonia         Assessment and Plan:      Summary of Stay: Emmanuel Clinton is a 72 year old male admitted on 12/25/2017 with pneumonia    Problem List:   1. Pneumonia.  Continue Levaquin, today is day 4 of 7.  Checked procalcitonin level, and low.    2. Pneumothorax.  CT chest with out contrast on 12/27/17 showed small pneumothorax.  Repeat CXR today does not show obvious residual pneumothorax.  Symptoms improving.  Monitor.  Would recommend following up with Pulmonologist as outpatient as soon as possible after discharge.  3. COPD exacerbation.  Decrease Prednisone to 40 mg/day for tomorrow's dose.  Continue Duonebs, Levaquin.  Albuterol PRN.  4. Hyperglycemia.  No known Diabetes.  HgA1c 6.1.  Continue NPH 10 units every morning while in hospital and on steroids.  Novolog SSI.  5. HTN.  Continue Metoprolol, HCTZ.  6. Hyperlipidemia.  Pravachol.  7. Chronic kidney disease.  Creatinine at baseline.  Avoid nephrotoxins as able.    8. Previous stroke.  Continue Plavix, Pravachol.  9. Deconditioning.  PT consult.  DVT Prophylaxis: Pneumatic Compression Devices  Code Status: Full Code  Discharge Dispo: Home  Estimated Disch Date / # of Days until Disch: 1-2        Interval History (Subjective):      Short of breath.  Coughing.  Both are mildly improved today from previous.  No CP, F/C, N/V, or diarrhea.                  Physical Exam:      Last Vital Signs:  /73 (BP Location: Right arm)  Temp 98.2  F (36.8  C) (Oral)  Resp 18  Ht 1.829 m (6')  Wt 83.2 kg (183 lb 8 oz)  SpO2 95%  BMI 24.89 kg/m2    Gen:  NAD, A&Ox3.  Eyes:  PERRL, sclera anicteric.  OP:  MMM, no lesions.  Neck:  Supple.  CV:  Regular, no murmurs.  Lung:  Wheeze b/l, normal effort.  Ab:  +BS, soft.  Skin:  Warm, dry to touch.  No rash.  Ext:  No pitting edema LE b/l.           Medications:      All current medications were  reviewed with changes reflected in problem list.         Data:      All new lab and imaging data was reviewed.   Labs:    Recent Labs  Lab 12/27/17  0754      POTASSIUM 4.2   CHLORIDE 102   CO2 25   ANIONGAP 8   *   BUN 26   CR 1.15   GFRESTIMATED 62   GFRESTBLACK 75   DANIEL 8.8       Recent Labs  Lab 12/26/17  0820   WBC 8.0   HGB 12.2*   HCT 36.3*   MCV 99         Imaging:   Recent Results (from the past 24 hour(s))   CT Chest w/o Contrast    Narrative    CT CHEST WITHOUT CONTRAST 12/27/2017 4:41 PM     HISTORY: Pneumonia.     Radiation dose for this scan was reduced using automated exposure  control, adjustment of the mA and/or kV according to patient size, or  iterative reconstruction technique.    FINDINGS: Aortic and coronary artery calcifications are noted. The  mediastinum and alisson are otherwise grossly unremarkable though  suboptimally evaluated without contrast enhancement. There is a small  right pneumothorax. No mediastinal shift is demonstrated. Hazy  interstitial opacity is noted in the inferior aspect of the right  upper lobe which could represent localized fibrosis. Superimposed  infiltrate is not entirely excluded. Similar smaller region is noted  near the left lung apex. Bilateral scattered micronodules are noted  within the left upper lobe and right middle lobe as well. There is no  pleural effusion.      Impression    IMPRESSION:  1. Right upper lobe ill-defined region of lacy increased opacity which  may represent localized interstitial fibrosis. Superimposed pneumonic  infiltrate cannot be entirely excluded. Similar smaller region is also  noted near the left lung apex.  2. Nonspecific micronodular opacities in the right middle lobe and  left upper lobe.  3. Small right pneumothorax.    The floor nurse was notified by phone regarding the small pneumothorax  at the time of this interpretation.    ELEANOR MAX MD   XR Chest 2 Views    Narrative    CHEST TWO VIEWS  12/28/2017  1:34 PM    HISTORY:  Pneumothorax.    COMPARISON:  12/25/2017      Impression    IMPRESSION:  No pneumothorax identified. Subtle hazy right midlung  opacities could represent asymmetric pulmonary edema or mild  infiltrate. This process appears to be slightly more cephalad in the  lung than on the comparison. Lungs otherwise clear. Heart size normal.  No obvious venous engorgement or indistinctness.    ISA WARD MD

## 2017-12-28 NOTE — PLAN OF CARE
Problem: Patient Care Overview  Goal: Plan of Care/Patient Progress Review  Pt alert and oriented. Calm and cooperative this shift. Per CT pt has R small Pneumothorax. Per hernando, pt on RA, equal expiratory wheezes on all lung fields. Reports SOB baseline. On Sats 955 on RA. Pepr per no changes in respiratory effort since dx of pneumothorax. Pt independent in room. On prednisone, levaquin and plavix. Patient encouraged to use IS, able to reach 2000 shellie.

## 2017-12-28 NOTE — PLAN OF CARE
"Problem: Patient Care Overview  Goal: Plan of Care/Patient Progress Review  Outcome: No Change  Pt remains hospitalized for: COPD exacerbation/PNA   Ambulatory Status:  Pt up ind with cane.  Orientation: a/o  VS:  BP elevated  Pain:  denies  Resp: LS dim. Cough infreq/nonprod. Denies SOB   GI:  denies nausea.  good appetite, on regular diet. +BS.  Passing flatus.  Last BM 12/24.  :  Voiding without difficulty   Tx:  Prednisone, Levaquin and neds   Labs:   and 159  Consults:  PT  Disposition:  tbd   Earlier this evening pt called a taxi to have them meet him at the front door to take him to his friends house in [place] for a short visit. Stated he will be back by 6:15 to order dinner. Instructed pt that he can not leave hospital campus and stay as an inpatient. Offered to have MD come to see so he could leave AMA or possibly d/c pt. Pt did not understand why he could not leave for a 1/2 hr and then come back. Educated pt on hospital policy, pt became agitated and angry with this writer. Walked pt back to room and requested pt to stay on floor for the rest of the shift. Pt became more angry with this writer and replied \"now I am a fucking prisoner of the hospital\" Safety checks done on pt. Pt remained calm for the rest of the shift.             "

## 2017-12-29 LAB
GLUCOSE BLDC GLUCOMTR-MCNC: 145 MG/DL (ref 70–99)
GLUCOSE BLDC GLUCOMTR-MCNC: 167 MG/DL (ref 70–99)
GLUCOSE BLDC GLUCOMTR-MCNC: 84 MG/DL (ref 70–99)
GLUCOSE BLDC GLUCOMTR-MCNC: 93 MG/DL (ref 70–99)
MAGNESIUM SERPL-MCNC: 2.2 MG/DL (ref 1.6–2.3)
POTASSIUM SERPL-SCNC: 4.1 MMOL/L (ref 3.4–5.3)

## 2017-12-29 PROCEDURE — 40000274 ZZH STATISTIC RCP CONSULT EA 30 MIN

## 2017-12-29 PROCEDURE — 99232 SBSQ HOSP IP/OBS MODERATE 35: CPT | Performed by: INTERNAL MEDICINE

## 2017-12-29 PROCEDURE — 40000275 ZZH STATISTIC RCP TIME EA 10 MIN

## 2017-12-29 PROCEDURE — 12000000 ZZH R&B MED SURG/OB

## 2017-12-29 PROCEDURE — 84132 ASSAY OF SERUM POTASSIUM: CPT | Performed by: INTERNAL MEDICINE

## 2017-12-29 PROCEDURE — 36415 COLL VENOUS BLD VENIPUNCTURE: CPT | Performed by: INTERNAL MEDICINE

## 2017-12-29 PROCEDURE — 25000125 ZZHC RX 250: Performed by: INTERNAL MEDICINE

## 2017-12-29 PROCEDURE — 94640 AIRWAY INHALATION TREATMENT: CPT | Mod: 76

## 2017-12-29 PROCEDURE — A9270 NON-COVERED ITEM OR SERVICE: HCPCS | Mod: GY | Performed by: INTERNAL MEDICINE

## 2017-12-29 PROCEDURE — 00000146 ZZHCL STATISTIC GLUCOSE BY METER IP

## 2017-12-29 PROCEDURE — 83735 ASSAY OF MAGNESIUM: CPT | Performed by: INTERNAL MEDICINE

## 2017-12-29 PROCEDURE — 25000132 ZZH RX MED GY IP 250 OP 250 PS 637: Mod: GY | Performed by: INTERNAL MEDICINE

## 2017-12-29 PROCEDURE — 94640 AIRWAY INHALATION TREATMENT: CPT

## 2017-12-29 RX ADMIN — HYDROCHLOROTHIAZIDE 12.5 MG: 12.5 CAPSULE ORAL at 09:32

## 2017-12-29 RX ADMIN — VITAMIN D, TAB 1000IU (100/BT) 1000 UNITS: 25 TAB at 09:32

## 2017-12-29 RX ADMIN — METOPROLOL SUCCINATE 100 MG: 100 TABLET, EXTENDED RELEASE ORAL at 09:32

## 2017-12-29 RX ADMIN — LEVOFLOXACIN 750 MG: 500 TABLET, FILM COATED ORAL at 19:21

## 2017-12-29 RX ADMIN — PREDNISONE 40 MG: 20 TABLET ORAL at 09:32

## 2017-12-29 RX ADMIN — IPRATROPIUM BROMIDE AND ALBUTEROL SULFATE 3 ML: .5; 3 SOLUTION RESPIRATORY (INHALATION) at 11:48

## 2017-12-29 RX ADMIN — IPRATROPIUM BROMIDE AND ALBUTEROL SULFATE 3 ML: .5; 3 SOLUTION RESPIRATORY (INHALATION) at 07:34

## 2017-12-29 RX ADMIN — PRAVASTATIN SODIUM 80 MG: 20 TABLET ORAL at 19:21

## 2017-12-29 RX ADMIN — CLOPIDOGREL 75 MG: 75 TABLET, FILM COATED ORAL at 09:32

## 2017-12-29 RX ADMIN — GUAIFENESIN AND CODEINE PHOSPHATE 5 ML: 10; 100 LIQUID ORAL at 21:02

## 2017-12-29 RX ADMIN — IPRATROPIUM BROMIDE AND ALBUTEROL SULFATE 3 ML: .5; 3 SOLUTION RESPIRATORY (INHALATION) at 15:15

## 2017-12-29 RX ADMIN — GUAIFENESIN AND CODEINE PHOSPHATE 5 ML: 10; 100 LIQUID ORAL at 05:36

## 2017-12-29 RX ADMIN — INSULIN ASPART 2 UNITS: 100 INJECTION, SOLUTION INTRAVENOUS; SUBCUTANEOUS at 11:31

## 2017-12-29 RX ADMIN — IPRATROPIUM BROMIDE AND ALBUTEROL SULFATE 3 ML: .5; 3 SOLUTION RESPIRATORY (INHALATION) at 19:13

## 2017-12-29 RX ADMIN — GUAIFENESIN AND CODEINE PHOSPHATE 5 ML: 10; 100 LIQUID ORAL at 00:59

## 2017-12-29 RX ADMIN — GUAIFENESIN AND CODEINE PHOSPHATE 5 ML: 10; 100 LIQUID ORAL at 16:47

## 2017-12-29 ASSESSMENT — ACTIVITIES OF DAILY LIVING (ADL)
ADLS_ACUITY_SCORE: 12

## 2017-12-29 NOTE — PROGRESS NOTES
"[facility]     Date: 12/29/17  Admission Dx: COPD  Pulmonary History COPD  Home Nebulizer/MDI Use: alb 2 Puff & Q6 prn  Home Oxygen: N/A  Acuity Level (RCAT flow sheet): 3  Aerosol Therapy initiated: Duo neb QID, Alb Q2 prn      Pulmonary Hygiene initiated: Deep breathe and cough TID      Volume Expansion initiated: IS      Current Oxygen Requirements: Room air Sats=94%  Current SpO2:    Re-evaluation date: 1/1/17    Patient Education: encourage to deep breathe and cough      See \"RT Assessments\" flow sheet for patient assessment scoring and Acuity Level Details.           "

## 2017-12-29 NOTE — PLAN OF CARE
Problem: Patient Care Overview  Goal: Plan of Care/Patient Progress Review  Outcome: Improving   Patient alert and oriented x 4. Up independently in room. Lung sounds diminished, using IS with encouragement.  Has intermittent  Non-productive cough. Gave pt  Robitussin  With codeine, x 2 last at 0500. Voiding well. BP slightly elevated, MD aware, pt on metoprolol. On oral  Levaquin and robitussin. Plan to discharge home today

## 2017-12-29 NOTE — PLAN OF CARE
Problem: Patient Care Overview  Goal: Plan of Care/Patient Progress Review  Outcome: Improving  Pt remains hospitalized for: PNA    Ambulatory Status:  Pt up ind with cane.  Orientation: a/o  VS:  VSS  Pain:  denies  Resp: LS dim/some exp wheezing. Cough infrequent nonprod  GI:  denies nausea.  good appetite, on regular diet. +BS.  Passing flatus.  Last BM 12/29.  :  Voiding without difficulty   Tx:  Levaquin and prednisone   Labs:  BG 84 and 191  Disposition:  Possible d/c Saturday

## 2017-12-29 NOTE — PROGRESS NOTES
Problem: PT General Care Plan  Goal: PT target date for goal attainment  Outcome: No Change  Pt continues to be hospitalized for PNA. VSS. RA. Frequent, productive cough. BG stable: 136 and 234. Tx; PO levaquin + prednisone. Up independently. Showered in the morning. Pt would like cough meds when they are do. Discharge possible tomorrow.

## 2017-12-29 NOTE — PROGRESS NOTES
[facility]  Hospitalist Progress Note  Indra Inman MD, MD 12/29/2017  (Text Page)  Reason for Stay (Diagnosis): pneumonia with recent outpatient treatment         Assessment and Plan:      Summary of Stay: Emmanuel Clinton is a 72 year old male with hx of COPD and active smoker  admitted on 12/25/2017 with increasing SOB and cough.    Problem List:   1. Pneumonia  2. Spontaneous pneumothorax- resolved  3. COPD in exacerbation  4. Active smoker  5. Steroid induced hyperglycemia  6. Hypertension  7. Hx of CKD  8. Prior CVA  9. Physical deconditioning    Continue inpatient care. Increase anti-tussive with robitussin as patient feels significant improvement with this regimen earlier. Remained on levaquin. Steroids switched to oral prednisone today.  Stop NPH, glucose levels controlled. Remained on SSI as needed.  Resumed plavix and pravastatin, BB and HCTZ  Duoneb.   Needs to stop smoking and pulmonology f/u as outpatient.    DVT Prophylaxis: Pneumatic Compression Devices  Code Status: Full Code  Discharge Dispo: home  Estimated Disch Date / # of Days until Disch: 24-36 hours        Interval History (Subjective):      Assumed care today.  Seen and examined  Oliver is feeling fine, he is tolerating oral diet. Decreasing coughing spells and earlier pleuritic chest pain (r sided) has resolved.  Remained afebrile.  No mental status changes, no nausea/vomiting, chest pain nor abdominal pain.                  Physical Exam:      Last Vital Signs:  /75 (BP Location: Right arm)  Temp 96.6  F (35.9  C) (Oral)  Resp 20  Ht 1.829 m (6')  Wt 82.8 kg (182 lb 8 oz)  SpO2 96%  BMI 24.75 kg/m2    I/O last 3 completed shifts:  In: 180 [P.O.:180]  Out: -   Wt Readings from Last 1 Encounters:   12/29/17 82.8 kg (182 lb 8 oz)     Vitals:    12/25/17 1827 12/27/17 0700 12/29/17 0408   Weight: 82.9 kg (182 lb 12.2 oz) 83.2 kg (183 lb 8 oz) 82.8 kg (182 lb 8 oz)       Constitutional: Awake, alert,  cooperative, no apparent distress   Respiratory: Fair air entry, still with mild wheezes on R LL   Cardiovascular: Regular rate and rhythm, normal S1 and S2, and no murmur noted   Abdomen: Normal bowel sounds, soft, non-distended, non-tender   Skin: No rashes, no cyanosis, dry to touch   Neuro: Alert and oriented x3, no weakness, spontaneous and coherent speech   Extremities: No edema, normal range of motion   Other(s): Euthymic mood, not agitated       All other systems: Negative          Medications:      All current medications were reviewed with changes reflected in problem list.         Data:      All new lab and imaging data was reviewed.   Labs:    Recent Labs  Lab 12/25/17  1715 12/25/17  1455   CULT No growth after 4 days No growth after 4 days       Recent Labs  Lab 12/26/17  0820 12/25/17  1455   WBC 8.0 9.4   HGB 12.2* 13.8   HCT 36.3* 40.3   MCV 99 97    219       Recent Labs  Lab 12/29/17  1039 12/27/17  0754 12/26/17  0820 12/25/17  1455   NA  --  135 136 136   POTASSIUM 4.1 4.2 5.1 3.7   CHLORIDE  --  102 103 100   CO2  --  25 23 26   ANIONGAP  --  8 10 10   GLC  --  178* 250* 106*   BUN  --  26 21 16   CR  --  1.15 1.21 1.27*   GFRESTIMATED  --  62 59* 55*   GFRESTBLACK  --  75 71 67   DANIEL  --  8.8 8.6 8.5   MAG 2.2  --   --  1.8     No results for input(s): SED, CRP in the last 168 hours.    Recent Labs  Lab 12/29/17  0931 12/29/17  0359 12/28/17  2125 12/28/17  1712 12/28/17  1117  12/27/17  0754  12/26/17  0820 12/25/17  1455   GLC  --   --   --   --   --   --  178*  --  250* 106*   BGM 84 93 234* 136* 126*  < >  --   < >  --   --    < > = values in this interval not displayed.    Recent Labs  Lab 12/25/17  1455   TROPI <0.015      Imaging:   Results for orders placed or performed during the hospital encounter of 12/25/17   XR Chest 2 Views    Narrative    CHEST TWO VIEWS    12/25/2017 4:21 PM     HISTORY: Chest pain.     COMPARISON: None.      Impression    IMPRESSION: Subtle hazy right  lower lobe opacity best seen on the  frontal view could represent asymmetric vascular congestion or  infiltrate. Unchanged blunting of the costophrenic angles. No  pneumothorax. Cardiac silhouette within normal limits.    FARZANA CURIEL MD   CT Chest w/o Contrast    Narrative    CT CHEST WITHOUT CONTRAST 12/27/2017 4:41 PM     HISTORY: Pneumonia.     Radiation dose for this scan was reduced using automated exposure  control, adjustment of the mA and/or kV according to patient size, or  iterative reconstruction technique.    FINDINGS: Aortic and coronary artery calcifications are noted. The  mediastinum and alisson are otherwise grossly unremarkable though  suboptimally evaluated without contrast enhancement. There is a small  right pneumothorax. No mediastinal shift is demonstrated. Hazy  interstitial opacity is noted in the inferior aspect of the right  upper lobe which could represent localized fibrosis. Superimposed  infiltrate is not entirely excluded. Similar smaller region is noted  near the left lung apex. Bilateral scattered micronodules are noted  within the left upper lobe and right middle lobe as well. There is no  pleural effusion.      Impression    IMPRESSION:  1. Right upper lobe ill-defined region of lacy increased opacity which  may represent localized interstitial fibrosis. Superimposed pneumonic  infiltrate cannot be entirely excluded. Similar smaller region is also  noted near the left lung apex.  2. Nonspecific micronodular opacities in the right middle lobe and  left upper lobe.  3. Small right pneumothorax.    The floor nurse was notified by phone regarding the small pneumothorax  at the time of this interpretation.    ELEANOR MAX MD   XR Chest 2 Views    Narrative    CHEST TWO VIEWS  12/28/2017 1:34 PM    HISTORY:  Pneumothorax.    COMPARISON:  12/25/2017      Impression    IMPRESSION:  No pneumothorax identified. Subtle hazy right midlung  opacities could represent asymmetric pulmonary edema  or mild  infiltrate. This process appears to be slightly more cephalad in the  lung than on the comparison. Lungs otherwise clear. Heart size normal.  No obvious venous engorgement or indistinctness.    ISA WARD MD

## 2017-12-30 VITALS
HEIGHT: 72 IN | RESPIRATION RATE: 18 BRPM | WEIGHT: 182.5 LBS | TEMPERATURE: 96.4 F | BODY MASS INDEX: 24.72 KG/M2 | SYSTOLIC BLOOD PRESSURE: 158 MMHG | OXYGEN SATURATION: 90 % | DIASTOLIC BLOOD PRESSURE: 86 MMHG

## 2017-12-30 LAB
GLUCOSE BLDC GLUCOMTR-MCNC: 107 MG/DL (ref 70–99)
GLUCOSE BLDC GLUCOMTR-MCNC: 108 MG/DL (ref 70–99)
GLUCOSE BLDC GLUCOMTR-MCNC: 90 MG/DL (ref 70–99)

## 2017-12-30 PROCEDURE — 25000125 ZZHC RX 250: Performed by: INTERNAL MEDICINE

## 2017-12-30 PROCEDURE — 40000275 ZZH STATISTIC RCP TIME EA 10 MIN

## 2017-12-30 PROCEDURE — 25000132 ZZH RX MED GY IP 250 OP 250 PS 637: Mod: GY | Performed by: INTERNAL MEDICINE

## 2017-12-30 PROCEDURE — 00000146 ZZHCL STATISTIC GLUCOSE BY METER IP

## 2017-12-30 PROCEDURE — 94640 AIRWAY INHALATION TREATMENT: CPT

## 2017-12-30 PROCEDURE — A9270 NON-COVERED ITEM OR SERVICE: HCPCS | Mod: GY | Performed by: INTERNAL MEDICINE

## 2017-12-30 PROCEDURE — 99239 HOSP IP/OBS DSCHRG MGMT >30: CPT | Performed by: INTERNAL MEDICINE

## 2017-12-30 PROCEDURE — 94640 AIRWAY INHALATION TREATMENT: CPT | Mod: 76

## 2017-12-30 RX ORDER — CODEINE PHOSPHATE AND GUAIFENESIN 10; 100 MG/5ML; MG/5ML
1 SOLUTION ORAL EVERY 4 HOURS PRN
Qty: 180 ML | Refills: 0 | Status: ON HOLD | OUTPATIENT
Start: 2017-12-30 | End: 2019-09-25

## 2017-12-30 RX ORDER — PREDNISONE 10 MG/1
TABLET ORAL
Qty: 9 TABLET | Refills: 0 | Status: ON HOLD | OUTPATIENT
Start: 2017-12-30 | End: 2019-09-25

## 2017-12-30 RX ORDER — LEVOFLOXACIN 750 MG/1
750 TABLET, FILM COATED ORAL EVERY 24 HOURS
Qty: 2 TABLET | Refills: 0 | Status: SHIPPED | OUTPATIENT
Start: 2017-12-30 | End: 2018-01-01

## 2017-12-30 RX ORDER — NICOTINE 21 MG/24HR
1 PATCH, TRANSDERMAL 24 HOURS TRANSDERMAL EVERY 24 HOURS
Qty: 30 PATCH | Refills: 0 | Status: ON HOLD | OUTPATIENT
Start: 2017-12-30 | End: 2019-09-25

## 2017-12-30 RX ADMIN — IPRATROPIUM BROMIDE AND ALBUTEROL SULFATE 3 ML: .5; 3 SOLUTION RESPIRATORY (INHALATION) at 07:47

## 2017-12-30 RX ADMIN — METOPROLOL SUCCINATE 100 MG: 100 TABLET, EXTENDED RELEASE ORAL at 09:05

## 2017-12-30 RX ADMIN — GUAIFENESIN AND CODEINE PHOSPHATE 5 ML: 10; 100 LIQUID ORAL at 10:59

## 2017-12-30 RX ADMIN — PREDNISONE 40 MG: 20 TABLET ORAL at 09:08

## 2017-12-30 RX ADMIN — IPRATROPIUM BROMIDE AND ALBUTEROL SULFATE 3 ML: .5; 3 SOLUTION RESPIRATORY (INHALATION) at 11:25

## 2017-12-30 RX ADMIN — VITAMIN D, TAB 1000IU (100/BT) 1000 UNITS: 25 TAB at 09:05

## 2017-12-30 RX ADMIN — HYDROCHLOROTHIAZIDE 12.5 MG: 12.5 CAPSULE ORAL at 09:04

## 2017-12-30 RX ADMIN — GUAIFENESIN AND CODEINE PHOSPHATE 5 ML: 10; 100 LIQUID ORAL at 05:50

## 2017-12-30 RX ADMIN — CLOPIDOGREL 75 MG: 75 TABLET, FILM COATED ORAL at 09:05

## 2017-12-30 ASSESSMENT — ACTIVITIES OF DAILY LIVING (ADL)
ADLS_ACUITY_SCORE: 12

## 2017-12-30 NOTE — PLAN OF CARE
Problem: Breathing Pattern Ineffective (Adult)  Goal: Effective Oxygenation/Ventilation  Patient will demonstrate the desired outcomes by discharge/transition of care.  Outcome: Improving  RA, satting high 90's. Prednisone and PO Levaquin continues. Scheduled nebs. Lung sounds dim/clear. Ambulating independently with cane. Tolerating regular diet. Blood sugars stable at 145, 167.

## 2017-12-30 NOTE — DISCHARGE SUMMARY
[facility]  Discharge Summary  Name: Emmanuel Clinton    MRN: [id number]  YOB: 1945    Age: 72 year old  Date of Discharge:  12/30/2017  Date of Admission: 12/25/2017  Primary Care Provider: Nicolette Carlson  Discharge Physician:  Indra Inman MD  Discharging Service:  Hospitalist      Discharge Diagnosis:  Community acquired pneumonia suspect bacterial with anaerobes but cultures negative- improved and resolving  Spontaneous pneumothorax- resolved  COPD in exacerbation  Tobaccoism  Steroid induced hyperglycemia  Hypertension   Physical deconditioning     Other Diagnosis:  Past Medical History:   Diagnosis Date     COPD (chronic obstructive pulmonary disease) (H)           Discharge Disposition:  Discharged to home     Allergies:  No Known Allergies     Discharge Medications:   Current Discharge Medication List      START taking these medications    Details   levofloxacin (LEVAQUIN) 750 MG tablet Take 1 tablet (750 mg) by mouth every 24 hours for 2 days  Qty: 2 tablet, Refills: 0    Associated Diagnoses: Pneumonia of right lower lobe due to infectious organism (H)      predniSONE (DELTASONE) 10 MG tablet Take 2 tabs (20 mg) by mouth daily x 3 days, 1 tabs (10 mg) daily x 3 days then stop 1  Qty: 9 tablet, Refills: 0    Associated Diagnoses: COPD exacerbation (H)      nicotine (NICODERM CQ) 14 MG/24HR 24 hr patch Place 1 patch onto the skin every 24 hours  Qty: 30 patch, Refills: 0    Associated Diagnoses: COPD exacerbation (H)         CONTINUE these medications which have CHANGED    Details   guaiFENesin-codeine (ROBITUSSIN AC) 100-10 MG/5ML SOLN solution Take 5 mLs by mouth every 4 hours as needed for cough  Qty: 180 mL, Refills: 0    Associated Diagnoses: COPD exacerbation (H)         CONTINUE these medications which have NOT CHANGED    Details   Multiple Vitamins-Minerals (CENTRUM SILVER) per tablet Take 1 tablet by mouth daily      triamcinolone (NASACORT AQ) 55 MCG/ACT Inhaler Spray  1 spray into both nostrils daily      acetaminophen (TYLENOL) 500 MG tablet Take 1,000 mg by mouth every 6 hours as needed       Cholecalciferol (VITAMIN D3) 1000 UNITS CAPS Take 1,000 Units by mouth daily       clopidogrel (PLAVIX) 75 MG tablet Take 75 mg by mouth daily       hydrochlorothiazide (MICROZIDE) 12.5 MG capsule TK ONE C PO  QAM TO  LOWER BP AND REMOVE FLUID  Refills: 3      metoprolol (TOPROL-XL) 100 MG 24 hr tablet Take 100 mg by mouth daily       pravastatin (PRAVACHOL) 20 MG tablet Take 80 mg by mouth every evening       traZODone (DESYREL) 50 MG tablet TK 1 T PO QHS prn  Refills: 0      albuterol (PROAIR HFA/PROVENTIL HFA/VENTOLIN HFA) 108 (90 BASE) MCG/ACT Inhaler Inhale 2 puffs into the lungs every 6 hours as needed for shortness of breath / dyspnea or wheezing  Qty: 1 Inhaler, Refills: 0    Associated Diagnoses: Wheezing              Condition on Discharge:  Discharge condition: Stable   Discharge vitals: Blood pressure 158/86, temperature 96.4  F (35.8  C), temperature source Oral, resp. rate 18, height 1.829 m (6'), weight 82.8 kg (182 lb 8 oz), SpO2 90 %.   Code status on discharge: Full Code     History of Present Illness:  See detailed admission note for full details.        Significant Physical Exam Findings Day of Discharge:  HEENT; Atraumatic, normocephalic, pinkish conjuctiva, pupils bilateral reactive   Skin: warm and moist, no rashes  Lungs: equal chest expansion, clear to auscultation, no wheezes  Heart: normal rate, normal rhythm, no rubs or gallops.   Abdomen: normal bowel sounds, no tenderness, no peritoneal signs, no guarding  Extremities: no deformities, no edema   Neuro; follow commands, alert and oriented x3, spontaneous speech, coherent, moves all extremities spontaneously  Psych; no hallucination, euthymic mood, not agitated    Procedures other than Imaging:  none     Imaging:  Results for orders placed or performed during the hospital encounter of 12/25/17   XR Chest 2 Views     Narrative    CHEST TWO VIEWS    12/25/2017 4:21 PM     HISTORY: Chest pain.     COMPARISON: None.      Impression    IMPRESSION: Subtle hazy right lower lobe opacity best seen on the  frontal view could represent asymmetric vascular congestion or  infiltrate. Unchanged blunting of the costophrenic angles. No  pneumothorax. Cardiac silhouette within normal limits.    FARZANA CURIEL MD   CT Chest w/o Contrast    Narrative    CT CHEST WITHOUT CONTRAST 12/27/2017 4:41 PM     HISTORY: Pneumonia.     Radiation dose for this scan was reduced using automated exposure  control, adjustment of the mA and/or kV according to patient size, or  iterative reconstruction technique.    FINDINGS: Aortic and coronary artery calcifications are noted. The  mediastinum and alisson are otherwise grossly unremarkable though  suboptimally evaluated without contrast enhancement. There is a small  right pneumothorax. No mediastinal shift is demonstrated. Hazy  interstitial opacity is noted in the inferior aspect of the right  upper lobe which could represent localized fibrosis. Superimposed  infiltrate is not entirely excluded. Similar smaller region is noted  near the left lung apex. Bilateral scattered micronodules are noted  within the left upper lobe and right middle lobe as well. There is no  pleural effusion.      Impression    IMPRESSION:  1. Right upper lobe ill-defined region of lacy increased opacity which  may represent localized interstitial fibrosis. Superimposed pneumonic  infiltrate cannot be entirely excluded. Similar smaller region is also  noted near the left lung apex.  2. Nonspecific micronodular opacities in the right middle lobe and  left upper lobe.  3. Small right pneumothorax.    The floor nurse was notified by phone regarding the small pneumothorax  at the time of this interpretation.    ELEANOR MAX MD   XR Chest 2 Views    Narrative    CHEST TWO VIEWS  12/28/2017 1:34 PM    HISTORY:  Pneumothorax.    COMPARISON:   12/25/2017      Impression    IMPRESSION:  No pneumothorax identified. Subtle hazy right midlung  opacities could represent asymmetric pulmonary edema or mild  infiltrate. This process appears to be slightly more cephalad in the  lung than on the comparison. Lungs otherwise clear. Heart size normal.  No obvious venous engorgement or indistinctness.    ISA WARD MD        Consultations:  No consultations were requested during this admission.     Recent Lab Results:    Recent Labs  Lab 12/26/17  0820 12/25/17  1455   WBC 8.0 9.4   HGB 12.2* 13.8   HCT 36.3* 40.3   MCV 99 97    219       Recent Labs  Lab 12/25/17  1715 12/25/17  1455   CULT No growth after 5 days No growth after 5 days       Recent Labs  Lab 12/29/17  1039 12/27/17  0754 12/26/17  0820 12/25/17  1455   NA  --  135 136 136   POTASSIUM 4.1 4.2 5.1 3.7   CHLORIDE  --  102 103 100   CO2  --  25 23 26   ANIONGAP  --  8 10 10   GLC  --  178* 250* 106*   BUN  --  26 21 16   CR  --  1.15 1.21 1.27*   GFRESTIMATED  --  62 59* 55*   GFRESTBLACK  --  75 71 67   DANIEL  --  8.8 8.6 8.5   MAG 2.2  --   --  1.8       Recent Labs  Lab 12/30/17  1102 12/30/17  0833 12/30/17  0122 12/29/17  2157 12/29/17  1819  12/27/17  0754  12/26/17  0820 12/25/17  1455   GLC  --   --   --   --   --   --  178*  --  250* 106*   * 90 107* 167* 145*  < >  --   < >  --   --    < > = values in this interval not displayed.  No results for input(s): LACT in the last 168 hours.    Recent Labs  Lab 12/25/17  1455   TROPI <0.015     No results for input(s): COLOR, APPEARANCE, URINEGLC, URINEBILI, URINEKETONE, SG, UBLD, URINEPH, PROTEIN, UROBILINOGEN, NITRITE, LEUKEST, RBCU, WBCU in the last 168 hours.       Pending Results:    Unresulted Labs Ordered in the Past 30 Days of this Admission     Date and Time Order Name Status Description    12/25/2017 1643 Blood culture Preliminary     12/25/2017 1640 Blood culture Preliminary            Discharge Instructions and Follow-Up:    Discharge diet:   Active Diet Order      Combination Diet Regular Diet Adult      Diet   Discharge activity: Activity as tolerated   Discharge follow-up: 1-2 weeks with PCP   Outpatient therapy: None    Other instructions: None      Hospital Course:  Emmanuel has a hx of COPD and remained an active smoker who has been having respiratory symptoms for ~1 month and has been treated with Z santa and Augmentin as outpatient. He presented with increasing SOB and coughing spells and was found with CAP and COPD in exacerbation. He was given with Levaquin, O2 support as needed, breathing treatment and pulmonary toilet.  Initial imaging also showed small pneumothorax that resolved with the subsequent follow up xrays  Oliver improves remarkably as he feels much better and has no further chest pain, SOB improves, tapered off O2 and ambulatory. He is now requesting for home discharge and will proceed with it.  He has a f/u care with [facility] next week.  Will send home to finish 7 day course of levaquin and tapering prednisone. He needs to stop smoking completely.     Total time spent in face to face contact with the patient and coordinating discharge was:  > 30 Minutes.

## 2017-12-30 NOTE — PLAN OF CARE
Problem: Patient Care Overview  Goal: Plan of Care/Patient Progress Review  Outcome: Improving  Pt states slept well, when up to BR did have cough spell, robitussin with codeine given. Lungs clear, denies pain. Independent in room. Plan for DC home today.

## 2017-12-31 LAB
BACTERIA SPEC CULT: NO GROWTH
BACTERIA SPEC CULT: NO GROWTH
Lab: NORMAL
Lab: NORMAL
SPECIMEN SOURCE: NORMAL
SPECIMEN SOURCE: NORMAL

## 2017-12-31 NOTE — PROGRESS NOTES
Transition Communication Hand-off for Care Transitions to Next Level of Care Provider    Name: Emmanuel Clinton  MRN #: [id number]  Primary Care Provider: Nicolette Carlson  Primary Care MD Name: NP Nicolette Carlson  Primary Clinic: [facility] [address]  [facility] [address]  Primary Care Clinic Name: [facility] [place]  Reason for Hospitalization:  COPD exacerbation (H) [J44.1]  Pneumonia of right lower lobe due to infectious organism (H) [J18.1]  Admit Date/Time: 12/25/2017  2:30 PM  Discharge Date: 12/30  Payor Source: Payor: MEDICA / Plan: MEDICA PRIME SOLUTION / Product Type: Indemnity /     Readmission Assessment Measure (MARYANA) Risk Score/category: average           Reason for Communication Hand-off Referral: Admission diagnoses: COPD    Discharge Plan: home       Concern for non-adherence with plan of care:   Y/N n  Discharge Needs Assessment:  Needs       Most Recent Value    Equipment Currently Used at Home cane, straight    Transportation Available family or friend will provide    # of Referrals Placed by [facility] External Care Coordination          Follow-up specialty is recommended: No    Follow-up plan:  No future appointments.    Any outstanding tests or procedures:              Key Recommendations:  Admitted with PNA and COPD exacerbation.  Failed outpatient treatment.  Is scheduled with [facility] on Feb 18th.  He is aware he should move this appt up if his symptoms return.  He still smokes 5 cigarettes a day.  He is aware of smoking cessation, but thinking of stopping by himself.  A1C 12/26/17 is 6.1.    [place]    AVS/Discharge Summary is the source of truth; this is a helpful guide for improved communication of patient story

## 2018-02-19 ENCOUNTER — TRANSFERRED RECORDS (OUTPATIENT)
Dept: HEALTH INFORMATION MANAGEMENT | Facility: CLINIC | Age: 73
End: 2018-02-19

## 2018-02-19 ENCOUNTER — MEDICAL CORRESPONDENCE (OUTPATIENT)
Dept: HEALTH INFORMATION MANAGEMENT | Facility: CLINIC | Age: 73
End: 2018-02-19

## 2018-02-21 DIAGNOSIS — H53.10 SUBJECTIVE VISUAL DISTURBANCE: Primary | ICD-10-CM

## 2018-02-22 ENCOUNTER — OFFICE VISIT (OUTPATIENT)
Dept: OPHTHALMOLOGY | Facility: CLINIC | Age: 73
End: 2018-02-22
Attending: OPHTHALMOLOGY
Payer: MEDICARE

## 2018-02-22 DIAGNOSIS — H53.10 SUBJECTIVE VISUAL DISTURBANCE: ICD-10-CM

## 2018-02-22 DIAGNOSIS — H47.019 AION (ACUTE ISCHEMIC OPTIC NEUROPATHY), UNSPECIFIED LATERALITY: Primary | ICD-10-CM

## 2018-02-22 PROCEDURE — G0463 HOSPITAL OUTPT CLINIC VISIT: HCPCS | Mod: ZF | Performed by: TECHNICIAN/TECHNOLOGIST

## 2018-02-22 PROCEDURE — 92083 EXTENDED VISUAL FIELD XM: CPT | Mod: ZF | Performed by: OPHTHALMOLOGY

## 2018-02-22 PROCEDURE — 92133 CPTRZD OPH DX IMG PST SGM ON: CPT | Mod: ZF | Performed by: OPHTHALMOLOGY

## 2018-02-22 RX ORDER — PREDNISONE 20 MG/1
TABLET ORAL
Refills: 0 | Status: ON HOLD | COMMUNITY
Start: 2017-12-05 | End: 2019-09-25

## 2018-02-22 RX ORDER — PRAVASTATIN SODIUM 80 MG/1
80 TABLET ORAL EVERY EVENING
Refills: 1 | Status: ON HOLD | COMMUNITY
Start: 2018-01-02 | End: 2019-09-26

## 2018-02-22 RX ORDER — CITALOPRAM HYDROBROMIDE 10 MG/1
10 TABLET ORAL
Status: ON HOLD | COMMUNITY
End: 2019-09-25

## 2018-02-22 RX ORDER — AZITHROMYCIN 250 MG/1
TABLET, FILM COATED ORAL
Refills: 1 | Status: ON HOLD | COMMUNITY
Start: 2017-11-30 | End: 2019-09-25

## 2018-02-22 RX ORDER — CETIRIZINE HYDROCHLORIDE 10 MG/1
TABLET ORAL
Refills: 5 | Status: ON HOLD | COMMUNITY
Start: 2017-03-20 | End: 2019-09-25

## 2018-02-22 RX ORDER — CHLORHEXIDINE GLUCONATE ORAL RINSE 1.2 MG/ML
SOLUTION DENTAL
Refills: 0 | Status: ON HOLD | COMMUNITY
Start: 2017-04-19 | End: 2019-09-25

## 2018-02-22 RX ORDER — METOPROLOL SUCCINATE 50 MG/1
TABLET, EXTENDED RELEASE ORAL
Refills: 2 | Status: ON HOLD | COMMUNITY
Start: 2017-09-02 | End: 2019-09-25

## 2018-02-22 RX ORDER — DIAZEPAM 5 MG
TABLET ORAL
Refills: 0 | Status: ON HOLD | COMMUNITY
Start: 2017-09-20 | End: 2019-09-25

## 2018-02-22 RX ORDER — GLYCOPYRROLATE AND FORMOTEROL FUMARATE 9; 4.8 UG/1; UG/1
2 AEROSOL, METERED RESPIRATORY (INHALATION) 2 TIMES DAILY
Refills: 6 | COMMUNITY
Start: 2018-01-03 | End: 2023-03-27

## 2018-02-22 RX ORDER — TRIAMCINOLONE ACETONIDE 1 MG/G
CREAM TOPICAL DAILY
Refills: 1 | COMMUNITY
Start: 2017-09-18

## 2018-02-22 ASSESSMENT — TONOMETRY
OD_IOP_MMHG: 12
IOP_METHOD: ICARE
OS_IOP_MMHG: 14

## 2018-02-22 ASSESSMENT — CONF VISUAL FIELD
OD_INFERIOR_TEMPORAL_RESTRICTION: 1
OS_INFERIOR_NASAL_RESTRICTION: 1
OS_SUPERIOR_TEMPORAL_RESTRICTION: 3
METHOD: COUNTING FINGERS
OS_INFERIOR_TEMPORAL_RESTRICTION: 3
OD_INFERIOR_NASAL_RESTRICTION: 1

## 2018-02-22 ASSESSMENT — VISUAL ACUITY
OS_SC: 20/500
METHOD: SNELLEN - LINEAR
OD_SC: 20/250

## 2018-02-22 ASSESSMENT — EXTERNAL EXAM - RIGHT EYE: OD_EXAM: NORMAL

## 2018-02-22 ASSESSMENT — CUP TO DISC RATIO
OD_RATIO: 0.15
OS_RATIO: 0.3

## 2018-02-22 ASSESSMENT — EXTERNAL EXAM - LEFT EYE: OS_EXAM: NORMAL

## 2018-02-22 ASSESSMENT — SLIT LAMP EXAM - LIDS
COMMENTS: NORMAL
COMMENTS: NORMAL

## 2018-02-22 NOTE — MR AVS SNAPSHOT
After Visit Summary   2018    Emmanuel Clinton    MRN: [id number]           Patient Information     Date Of Birth          1945        Visit Information        Provider Department      2018 7:30 AM Dayron Carpenter MD [facility]        Today's Diagnoses     AION (acute ischemic optic neuropathy), unspecified laterality    -  1    Subjective visual disturbance           Follow-ups after your visit        Your next 10 appointments already scheduled     2018  7:30 AM CST   NEW RETINA with Natalie Moreira MD   Eye Clinic ([facility])    [place] [address]  [address] Clin [address] [place] [address]   [phone]              Who to contact     Please call your clinic at [phone] to:    Ask questions about your health    Make or cancel appointments    Discuss your medicines    Learn about your test results    Speak to your doctor            Additional Information About Your Visit        MyChart Information     "[employer]t is an electronic gateway that provides easy, online access to your medical records. With [employer], you can request a clinic appointment, read your test results, renew a prescription or communicate with your care team.     To sign up for "[employer]t visit the website at [email or web address]"   You will be asked to enter the access code listed below, as well as some personal information. Please follow the directions to create your username and password.     Your access code is: [email or web address]  Expires: 2018  2:38 PM     Your access code will  in 90 days. If you need help or a new code, please contact your [facility] or call [phone] for assistance.        Care EveryWhere ID     This is your Care EveryWhere ID. This could be used by other organizations to access your [place] medical records  [id number]         Blood Pressure from Last 3 Encounters:   17 158/86    11/26/17 130/81   08/01/16 (!) 140/92    Weight from Last 3 Encounters:   12/29/17 82.8 kg (182 lb 8 oz)   11/26/17 85 kg (187 lb 8 oz)   08/01/16 81.6 kg (180 lb)              We Performed the Following     Glaucoma Top OU     IOP Measurement     OCT Optic Nerve RNFL Spectralis OU (both eyes)        Primary Care Provider Office Phone # Fax #    Nicolette Carlson [phone]       [facility] [address]  [facility] [address]        Equal Access to Services     [facility]: Hadii aad ku hadasho Soomaali, waaxda luqadaha, qaybta kaalmada adeegyada, paloma alonzo . So [facility] [phone].    ATENCIÓN: Si habla español, tiene a watt disposición servicios gratuitos de asistencia lingüística. [facility] [phone].    We comply with applicable federal civil rights laws and [place] laws. We do not discriminate on the basis of race, color, national origin, age, disability, sex, sexual orientation, or gender identity.            Thank you!     Thank you for choosing EYE CLINIC  for your care. Our goal is always to provide you with excellent care. Hearing back from our patients is one way we can continue to improve our services. Please take a few minutes to complete the written survey that you may receive in the mail after your visit with us. Thank you!             Your Updated Medication List - Protect others around you: Learn how to safely use, store and throw away your medicines at www.disposemymeds.org.          This list is accurate as of 2/22/18  9:31 AM.  Always use your most recent med list.                   Brand Name Dispense Instructions for use Diagnosis    acetaminophen 500 MG tablet    TYLENOL     Take 1,000 mg by mouth every 6 hours as needed        albuterol 108 (90 BASE) MCG/ACT Inhaler    PROAIR HFA/PROVENTIL HFA/VENTOLIN HFA    1 Inhaler    Inhale 2 puffs into the lungs every 6 hours as needed for shortness of breath / dyspnea or wheezing    Wheezing        amoxicillin-clavulanate 875-125 MG per tablet    AUGMENTIN     TK 1 T PO BID WC FOR 10 DAYS        azithromycin 250 MG tablet    ZITHROMAX          BEVESPI AEROSPHERE 9-4.8 MCG/ACT oral inhaler   Generic drug:  Glycopyrrolate-Formoterol      INL 2 PFS PO BID IN THE MORNING AND IN THE JUNE        CENTRUM SILVER per tablet      Take 1 tablet by mouth daily        cetirizine 10 MG tablet    zyrTEC     TK 1 T PO D        chlorhexidine 0.12 % solution    PERIDEX     SWISH 15 ML PO FOR 1 MINUTE AND EXPECTORATE QAM AND QHS FOR 7 DAYS        citalopram 10 MG tablet    celeXA     Take 10 mg by mouth        clopidogrel 75 MG tablet    PLAVIX     Take 75 mg by mouth daily        diazepam 5 MG tablet    VALIUM          guaiFENesin-codeine 100-10 MG/5ML Soln solution    ROBITUSSIN AC    180 mL    Take 5 mLs by mouth every 4 hours as needed for cough    COPD exacerbation (H)       hydrochlorothiazide 12.5 MG capsule    MICROZIDE     TK ONE C PO  QAM TO  LOWER BP AND REMOVE FLUID        LYCOPENE PO      Take 1 tablet by mouth        * metoprolol succinate 50 MG 24 hr tablet    TOPROL-XL     TK 1 T PO QD        * metoprolol succinate 100 MG 24 hr tablet    TOPROL-XL     Take 100 mg by mouth daily        NASACORT AQ 55 MCG/ACT Inhaler   Generic drug:  triamcinolone      Spray 1 spray into both nostrils daily        nicotine 14 MG/24HR 24 hr patch    NICODERM CQ    30 patch    Place 1 patch onto the skin every 24 hours    COPD exacerbation (H)       * PRAVACHOL 20 MG tablet   Generic drug:  pravastatin      Take 80 mg by mouth every evening        * pravastatin 80 MG tablet    PRAVACHOL     TK 1 T PO QD WITH THE JUNE MEAL        * predniSONE 20 MG tablet    DELTASONE     TK 1 T PO QD WA        * predniSONE 10 MG tablet    DELTASONE    9 tablet    Take 2 tabs (20 mg) by mouth daily x 3 days, 1 tabs (10 mg) daily x 3 days then stop 1    COPD exacerbation (H)       traZODone 50 MG tablet    DESYREL     TK 1 T PO QHS prn         triamcinolone 0.1 % cream    KENALOG     APPLY TOPICALLY BID TO RASH UTD        vitamin D3 1000 UNITS Caps      Take 1,000 Units by mouth daily        * Notice:  This list has 6 medication(s) that are the same as other medications prescribed for you. Read the directions carefully, and ask your doctor or other care provider to review them with you.

## 2018-02-22 NOTE — PROGRESS NOTES
Assessment & Plan     Emmanuel Clinton is a 72 year old male with the following diagnoses:   1. AION (acute ischemic optic neuropathy), unspecified laterality    2. Subjective visual disturbance       He had Cataract extraction in the LEFT eye a few years ago.  About a month later, he lost his vision. He was told that his optic nerve was swollen. He was diagnosed with Anterior ischemic optic neuropathy (AION).  Overall, it didn't change.  Did not have a temporal artery biopsy at that time.  He had a Cataract extraction in the RIGHT eye at around the same time as the LEFT eye.      On 2/12/18, he developed blurred vision in his RIGHT eye.  He had pain in the RIGHT eye at that time about 4/10.  This pain comes and goes, 4-5x/d, lasts less than a minute.  It is staying about the same.  The vision in the RIGHT eye got worse for about 5 days and has remained the same since.  No arthralgias, no jaw claudication, no fevers, weight loss, anorexia.  His energy level is about the same for the past few years.  Sedimentation rate on 2/19/18 was 3 and crp was 0.09.     On exam his visual acuity is 20/250 in the right eye and 20/500 in the left eye.  He cannot see any of the color plates with either eye.  He has no evidence of an a variant pupillary defect.  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 His visual fields show a altitudinal defect in both eyes and arcuate defect in both eyes.  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    It is my impression that he experienced ischemic optic neuropathy in the left eye a few years ago and has a sequential attack in the right eye now.  I discussed with him that there is no evidence based treatment for ischemic optic neuropathy.  We discussed a ongoing multicentered clinical trial using a novel neuroprotective agent.  He is not interested in participating in the study.  We also discussed that there are case reports of  using intravitreal steroids for ischemic optic neuropathy that have shown some benefit.  He is interested in pursuing this.  I I do not think it is unreasonable to consider.  I will ask 1 of our retina specialist to consider this.  I will ask him to follow-up in 4 weeks sooner as needed.  He has no symptoms of giant cell arteritis and his sed rate and C-reactive protein are normal and I think that is unlikely disorder here             Attending Physician Attestation:  Complete documentation of historical and exam elements from today's encounter can be found in the full encounter summary report (not reduplicated in this progress note).  I personally obtained the chief complaint(s) and history of present illness.  I confirmed and edited as necessary the review of systems, past medical/surgical history, family history, social history, and examination findings as documented by others; and I examined the patient myself.  I personally reviewed the relevant tests, images, and reports as documented above.  I formulated and edited as necessary the assessment and plan and discussed the findings and management plan with the patient and family. - Dayron Carpenter MD

## 2018-02-22 NOTE — NURSING NOTE
Chief Complaints and History of Present Illnesses   Patient presents with     New Patient     referred for vision loss in right eye     HPI    Symptoms:              Comments:  Emmanuel Clinton is a 72 year old male, referred by Cristian Aldana MD for:    1. Vision loss in right eye.   Optic neuropathy in right eye.   2/12/18, vision loss in right eye after going to work and computer screen was blurry.   Denies trauma.     Patient states loss of vision in left eye as well.   Happened a few years back and there's no vision in left eye anymore.   CT scan recently for his lungs.   MIRI Levi 2/22/2018 7:31 AM

## 2018-02-22 NOTE — LETTER
2018         Cristian Aldnaa MD    RE:  :  MRN: Emmanuel Clinton  1945  [id number]     Dear Dr. Aldana:    Thank you for asking me to see your very pleasant patient, Emmanuel Clinton, in neuro-ophthalmic consultation.  I would like to thank you for sending your records and I have summarized them in the history of present illness. He presented with his spouse who provided additional history.  My assessment and plan are below.  For further details, please see my attached clinic note.      Assessment & Plan   Emmanuel Clinton is a 72 year old male with the following diagnoses:   1. AION (acute ischemic optic neuropathy), unspecified laterality    2. Subjective visual disturbance       He had Cataract extraction in the LEFT eye a few years ago.  About a month later, he lost his vision. He was told that his optic nerve was swollen. He was diagnosed with Anterior ischemic optic neuropathy (AION).  Overall, it didn't change.  Did not have a temporal artery biopsy at that time.  He had a Cataract extraction in the RIGHT eye at around the same time as the LEFT eye.      On 18, he developed blurred vision in his RIGHT eye.  He had pain in the RIGHT eye at that time about 4/10.  This pain comes and goes, 4-5x/d, lasts less than a minute.  It is staying about the same.  The vision in the RIGHT eye got worse for about 5 days and has remained the same since.  No arthralgias, no jaw claudication, no fevers, weight loss, anorexia.  His energy level is about the same for the past few years.  Sedimentation rate on 18 was 3 and crp was 0.09.     On exam his visual acuity is 20/250 in the right eye and 20/500 in the left eye.  He cannot see any of the color plates with either eye.  He has no evidence of an a variant pupillary defect.  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 His visual fields show a altitudinal defect in both eyes and arcuate defect in both eyes.  OCT  confirms optic disc edema in the right eye atrophy in the left eye.    It is my impression that he experienced ischemic optic neuropathy in the left eye a few years ago and has a sequential attack in the right eye now.  I discussed with him that there is no evidence based treatment for ischemic optic neuropathy.  We discussed a ongoing multicentered clinical trial using a novel neuroprotective agent.  He is not interested in participating in the study.  We also discussed that there are case reports of using intravitreal steroids for ischemic optic neuropathy that have shown some benefit.  He is interested in pursuing this.  I I do not think it is unreasonable to consider.  I will ask 1 of our retina specialist to consider this.  I will ask him to follow-up in 4 weeks sooner as needed.  He has no symptoms of giant cell arteritis and his sed rate and C-reactive protein are normal and I think that is unlikely disorder here    Again, thank you for allowing me to participate in the care of your patient.      Sincerely,    Dayron Carpenter MD  Professor, Neuro-Ophthalmology  Department of Ophthalmology and Visual Neurosciences  [facility]    CC:   Nicolette Carlson MD         DX = Anterior ischemic optic neuropathy (AION), sequential

## 2018-02-23 ENCOUNTER — OFFICE VISIT (OUTPATIENT)
Dept: OPHTHALMOLOGY | Facility: CLINIC | Age: 73
End: 2018-02-23
Attending: OPHTHALMOLOGY
Payer: MEDICARE

## 2018-02-23 DIAGNOSIS — H47.019 AION (ACUTE ISCHEMIC OPTIC NEUROPATHY), UNSPECIFIED LATERALITY: Primary | ICD-10-CM

## 2018-02-23 PROCEDURE — 40000269 ZZH STATISTIC NO CHARGE FACILITY FEE: Mod: ZF

## 2018-02-23 PROCEDURE — 25000128 H RX IP 250 OP 636: Mod: ZF | Performed by: OPHTHALMOLOGY

## 2018-02-23 PROCEDURE — 67028 INJECTION EYE DRUG: CPT | Mod: ZF | Performed by: OPHTHALMOLOGY

## 2018-02-23 PROCEDURE — G0463 HOSPITAL OUTPT CLINIC VISIT: HCPCS | Mod: ZF,25

## 2018-02-23 RX ADMIN — TRIAMCINOLONE ACETONIDE 4 MG: 40 INJECTION, SUSPENSION OPHTHALMIC at 08:56

## 2018-02-23 ASSESSMENT — TONOMETRY
OS_IOP_MMHG: 21
OD_IOP_MMHG: 15
IOP_METHOD: TONOPEN

## 2018-02-23 ASSESSMENT — VISUAL ACUITY
OD_SC: 20/600
OS_SC: 20/500
METHOD: SNELLEN - LINEAR

## 2018-02-23 ASSESSMENT — CONF VISUAL FIELD
OS_SUPERIOR_TEMPORAL_RESTRICTION: 3
OS_SUPERIOR_NASAL_RESTRICTION: 3
OD_SUPERIOR_TEMPORAL_RESTRICTION: 3
OD_SUPERIOR_NASAL_RESTRICTION: 3
OD_INFERIOR_TEMPORAL_RESTRICTION: 3
OS_INFERIOR_NASAL_RESTRICTION: 3
OS_INFERIOR_TEMPORAL_RESTRICTION: 3
OD_INFERIOR_NASAL_RESTRICTION: 3

## 2018-02-23 ASSESSMENT — SLIT LAMP EXAM - LIDS
COMMENTS: NORMAL
COMMENTS: NORMAL

## 2018-02-23 ASSESSMENT — CUP TO DISC RATIO
OD_RATIO: 0.15
OS_RATIO: 0.3

## 2018-02-23 ASSESSMENT — EXTERNAL EXAM - RIGHT EYE: OD_EXAM: NORMAL

## 2018-02-23 ASSESSMENT — EXTERNAL EXAM - LEFT EYE: OS_EXAM: NORMAL

## 2018-02-23 NOTE — MR AVS SNAPSHOT
After Visit Summary   2018    Emmanuel Clinton    MRN: [id number]           Patient Information     Date Of Birth          1945        Visit Information        Provider Department      2018 7:30 AM Natalie Moreira MD [facility]        Today's Diagnoses     AION (acute ischemic optic neuropathy), unspecified laterality    -  1       Follow-ups after your visit        Your next 10 appointments already scheduled     Mar 29, 2018  1:00 PM CDT   RETURN NEURO with Dayron Carpenter MD   Eye Clinic ([facility])    [address]  [facility] [address] [place] [id number]   [phone]              Who to contact     Please call your clinic at [phone] to:    Ask questions about your health    Make or cancel appointments    Discuss your medicines    Learn about your test results    Speak to your doctor            Additional Information About Your Visit        MyChart Information     [employer] is an electronic gateway that provides easy, online access to your medical records. With [employer], you can request a clinic appointment, read your test results, renew a prescription or communicate with your care team.     To sign up for [employer] visit the website at [email or web address]   You will be asked to enter the access code listed below, as well as some personal information. Please follow the directions to create your username and password.     Your access code is: [email or web address]  Expires: 2018  2:38 PM     Your access code will  in 90 days. If you need help or a new code, please contact your [facility] or call [phone] for assistance.        Care EveryWhere ID     This is your Care EveryWhere ID. This could be used by other organizations to access your [place] medical records  [id number]         Blood Pressure from Last 3 Encounters:   17 158/86   17 130/81   16 (!) 140/92     Weight from Last 3 Encounters:   12/29/17 82.8 kg (182 lb 8 oz)   11/26/17 85 kg (187 lb 8 oz)   08/01/16 81.6 kg (180 lb)              We Performed the Following     Triescence Intravitreal Injection OD (right eye)        Primary Care Provider Office Phone # Fax #    Nicolette Carlson [phone]       [facility] [address]  [facility] [address]        Equal Access to Services     DIXIE AREVALO : Hadii aad ku hadasho Soomaali, waaxda luqadaha, qaybta kaalmada adeegyada, waxay idiin hayaan adeeg kharash la'aan . So [facility] [phone].    ATENCIÓN: Si habla español, tiene a watt disposición servicios gratuitos de asistencia lingüística. [facility] [phone].    We comply with applicable federal civil rights laws and [place] laws. We do not discriminate on the basis of race, color, national origin, age, disability, sex, sexual orientation, or gender identity.            Thank you!     Thank you for choosing EYE CLINIC  for your care. Our goal is always to provide you with excellent care. Hearing back from our patients is one way we can continue to improve our services. Please take a few minutes to complete the written survey that you may receive in the mail after your visit with us. Thank you!             Your Updated Medication List - Protect others around you: Learn how to safely use, store and throw away your medicines at www.disposemymeds.org.          This list is accurate as of 2/23/18  8:59 AM.  Always use your most recent med list.                   Brand Name Dispense Instructions for use Diagnosis    acetaminophen 500 MG tablet    TYLENOL     Take 1,000 mg by mouth every 6 hours as needed        albuterol 108 (90 BASE) MCG/ACT Inhaler    PROAIR HFA/PROVENTIL HFA/VENTOLIN HFA    1 Inhaler    Inhale 2 puffs into the lungs every 6 hours as needed for shortness of breath / dyspnea or wheezing    Wheezing       amoxicillin-clavulanate 875-125 MG per tablet    AUGMENTIN     TK 1 T PO  BID WC FOR 10 DAYS        azithromycin 250 MG tablet    ZITHROMAX          BEVESPI AEROSPHERE 9-4.8 MCG/ACT oral inhaler   Generic drug:  Glycopyrrolate-Formoterol      INL 2 PFS PO BID IN THE MORNING AND IN THE JUNE        CENTRUM SILVER per tablet      Take 1 tablet by mouth daily        cetirizine 10 MG tablet    zyrTEC     TK 1 T PO D        chlorhexidine 0.12 % solution    PERIDEX     SWISH 15 ML PO FOR 1 MINUTE AND EXPECTORATE QAM AND QHS FOR 7 DAYS        citalopram 10 MG tablet    celeXA     Take 10 mg by mouth        clopidogrel 75 MG tablet    PLAVIX     Take 75 mg by mouth daily        diazepam 5 MG tablet    VALIUM          guaiFENesin-codeine 100-10 MG/5ML Soln solution    ROBITUSSIN AC    180 mL    Take 5 mLs by mouth every 4 hours as needed for cough    COPD exacerbation (H)       hydrochlorothiazide 12.5 MG capsule    MICROZIDE     TK ONE C PO  QAM TO  LOWER BP AND REMOVE FLUID        LYCOPENE PO      Take 1 tablet by mouth        * metoprolol succinate 50 MG 24 hr tablet    TOPROL-XL     TK 1 T PO QD        * metoprolol succinate 100 MG 24 hr tablet    TOPROL-XL     Take 100 mg by mouth daily        NASACORT AQ 55 MCG/ACT Inhaler   Generic drug:  triamcinolone      Spray 1 spray into both nostrils daily        nicotine 14 MG/24HR 24 hr patch    NICODERM CQ    30 patch    Place 1 patch onto the skin every 24 hours    COPD exacerbation (H)       * PRAVACHOL 20 MG tablet   Generic drug:  pravastatin      Take 80 mg by mouth every evening        * pravastatin 80 MG tablet    PRAVACHOL     TK 1 T PO QD WITH THE JUNE MEAL        * predniSONE 20 MG tablet    DELTASONE     TK 1 T PO QD WAC        * predniSONE 10 MG tablet    DELTASONE    9 tablet    Take 2 tabs (20 mg) by mouth daily x 3 days, 1 tabs (10 mg) daily x 3 days then stop 1    COPD exacerbation (H)       traZODone 50 MG tablet    DESYREL     TK 1 T PO QHS prn        triamcinolone 0.1 % cream    KENALOG     APPLY TOPICALLY BID TO RASH UTD         vitamin D3 1000 UNITS Caps      Take 1,000 Units by mouth daily        * Notice:  This list has 6 medication(s) that are the same as other medications prescribed for you. Read the directions carefully, and ask your doctor or other care provider to review them with you.

## 2018-02-23 NOTE — NURSING NOTE
Chief Complaints and History of Present Illnesses   Patient presents with     Consult For     HPI    Affected eye(s):  Both   Symptoms:        Frequency:  Constant       Do you have eye pain now?:  No      Comments:  States that the va has decreased D&N  No F&F  Debra Easley COT 7:29 AM February 23, 2018

## 2018-02-23 NOTE — PROGRESS NOTES
CC -   AION    INTERVAL HISTORY - Initial visit with me.  Sent by HARISH Carpenter for possible IVTA OD for recent NAION after prior NAION OS.     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  VA OD declined ~2/12/18, worsened then stabilized.  Saw HARISH Carpenter found NAION OD.  NAION OS ~2014 occurred few months after CE/IOL.      PAST OCULAR SURGERY  CE/IOL OU ~2014    RETINAL IMAGING:  OCT   OD -   OS -       ASSESSMENT & PLAN    1.  NAION OD   - since 2/12/18, saw HARISH Carpenter   - will try IVTA   - r/b/a d/w patient, off-label use   - potential benefit but only preliminary studies   - risk of infection, glaucoma, need for surgery, blindness, vision loss     - agrees to proceed    2.  NAION OS   - since ~ 2014 per patient      3.  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  I personally obtained the chief complaint(s) and history of present illness.  I confirmed and edited as necessary the review of systems, past medical/surgical history, family history, social history, and examination findings as documented by others; and I examined the patient myself.  I personally reviewed the relevant tests, images, and reports as documented above.  I formulated and edited as necessary the assessment and plan and discussed the findings and management plan with the patient and family and No resident or fellow assisted with the procedures performed.  I performed the procedures myself.    Natalie Moreira MD, PhD  , Vitreoretinal Surgery  Department of Ophthalmology  [facility]

## 2018-02-23 NOTE — Clinical Note
Ashwin Nowak, in general would you like to see the 1 month return?  They need an IOP check anyway, so I thought I'd schedule them with you so you could assess efficacy

## 2018-03-28 DIAGNOSIS — H47.019 AION (ACUTE ISCHEMIC OPTIC NEUROPATHY): Primary | ICD-10-CM

## 2018-03-29 ENCOUNTER — OFFICE VISIT (OUTPATIENT)
Dept: OPHTHALMOLOGY | Facility: CLINIC | Age: 73
End: 2018-03-29
Attending: OPHTHALMOLOGY
Payer: MEDICARE

## 2018-03-29 DIAGNOSIS — H47.013 NAION (NON-ARTERITIC ANTERIOR ISCHEMIC OPTIC NEUROPATHY), BOTH EYES: Primary | ICD-10-CM

## 2018-03-29 DIAGNOSIS — H47.019 AION (ACUTE ISCHEMIC OPTIC NEUROPATHY): ICD-10-CM

## 2018-03-29 PROCEDURE — G0463 HOSPITAL OUTPT CLINIC VISIT: HCPCS | Mod: ZF | Performed by: TECHNICIAN/TECHNOLOGIST

## 2018-03-29 PROCEDURE — 92083 EXTENDED VISUAL FIELD XM: CPT | Mod: ZF | Performed by: OPHTHALMOLOGY

## 2018-03-29 ASSESSMENT — CONF VISUAL FIELD
METHOD: COUNTING FINGERS
OS_INFERIOR_NASAL_RESTRICTION: 1
OD_INFERIOR_NASAL_RESTRICTION: 3

## 2018-03-29 ASSESSMENT — SLIT LAMP EXAM - LIDS
COMMENTS: NORMAL
COMMENTS: NORMAL

## 2018-03-29 ASSESSMENT — VISUAL ACUITY
OD_SC: 20/300
OS_SC: 20/400
METHOD: SNELLEN - LINEAR

## 2018-03-29 ASSESSMENT — EXTERNAL EXAM - RIGHT EYE: OD_EXAM: NORMAL

## 2018-03-29 ASSESSMENT — TONOMETRY
OD_IOP_MMHG: 16
OS_IOP_MMHG: 15
IOP_METHOD: ICARE

## 2018-03-29 ASSESSMENT — CUP TO DISC RATIO
OD_RATIO: 0.15
OS_RATIO: 0.3

## 2018-03-29 ASSESSMENT — EXTERNAL EXAM - LEFT EYE: OS_EXAM: NORMAL

## 2018-03-29 NOTE — NURSING NOTE
Chief Complaints and History of Present Illnesses   Patient presents with     Follow Up For     AION     HPI    Symptoms:              Comments:  Emmanuel Clinton is a 72 year old male with the following diagnoses:   1. AION (acute ischemic optic neuropathy), unspecified laterality   2. Subjective visual disturbance     IVTA OD with Dr. Moreira a month ago.   Patient states vision stable, no new changes.   Denies flashes and floaters.   MIRI Levi 3/29/2018 12:40 PM

## 2018-03-29 NOTE — MR AVS SNAPSHOT
After Visit Summary   3/29/2018    Emmanuel Clinton    MRN: [id number]           Patient Information     Date Of Birth          1945        Visit Information        Provider Department      3/29/2018 1:00 PM [facility]        Today's Diagnoses     NAION (non-arteritic anterior ischemic optic neuropathy), both eyes    -  1    AION (acute ischemic optic neuropathy)          Care Instructions    [email or web address]    They say that death comes like a thief in the night. Lesser vandrita have the same M.O. The affliction that stole my vision, or at least a big chunk of it, did so as I slept. I went to bed seeing the world one way. I woke up seeing it another.    This was about four months ago, though it feels like an eternity. So much has happened since. I don t mean all the tests and procedures: the vials upon vials of blood; the mapping of major arteries in my neck; the imaging of tiny vessels in my brain; the first injection of an experimental treatment (or, maybe, a placebo) into my right, dominant eye, where the damage occurred; then the second injection; and then, last week, the third.    I mean the rest of it. I went to bed believing that I was more or less in control -- that the unfinished business, unrealized dreams and other disappointments in my life were essentially failures of industry and imagination, and could probably be redeemed with a fierce enough effort. I woke up to the realization of how ludicrous that was.    So I flailed on two fronts. I tried to grow accustomed, day by disorienting day, to reading and typing with a thick, dappled fog across the right half of my field of vision, which was sometimes tilted and off-kilter. I felt drunk without being drunk, dizzy but not exactly dizzy.    I also fought not to be angry and afraid, a struggle familiar to anyone with a significant illness or disability. The  fear arose less from what I d already lost than from what I might lose still. Over the next five years, there s a roughly 20 percent chance that what happened to my right eye could also happen to my left. I could go blind.    The odds are on my side. But the stakes are enormous. So how am I supposed to process this? Where on the spectrum of optimism to resignation -- of hope to dread -- do I position myself?      On that first morning, several hours passed before I accepted that something was seriously wrong. I figured that I was just groggier than usual. Maybe I needed more coffee.    As I sat at my computer transcribing a long interview that I d just done, I repeatedly took off my eyeglasses to clean them, convinced that the smudginess of my vision was some streakiness on the lenses. When I finally gave up on that, I rinsed my eyes with water -- to no avail. It was as if someone had deposited a blob of petroleum jelly in my right eye and nothing would dislodge it.    The next morning I visited my ophthalmologist, who for many years had kept tabs on my run-of-the-mill astigmatism and fine-tuned my prescription so that I saw 20-20 with each eye. He said that in this case I needed a neuro-ophthalmologist. (I didn t know that such a specialty existed.) I found one, Herminia Dill, who could squeeze me in a few days later, and after three tedious hours of staring at or into charts and colored patterns and sophisticated machines, she told me this, in a rat-tat-tat fashion:    I had almost certainly experienced what is colloquially called  a stroke of the eye,  whereby the optic nerve is ravaged by a brief reduction of blood flow and thus oxygen. The name for this condition is nonarteritic anterior ischemic optic neuropathy (N.A.I.O.N.), and it afflicts perhaps one in 10,000 Americans. But I d need extensive blood work and tests to rule out other possibilities.    This tends to occur after the age of 50. (I m 51.) It  typically strikes during sleep, when blood pressure drops, and is sometimes associated with sleep apnea, diabetes, hypertension or the use of pills for erectile dysfunction -- none of which applied to me. I was a mystery.    I would be surprised, Dr. Dill said, by my brain s eventual adjustment. It would edit my right eye out of the equation so that my left eye could guide me on its own, leaving me with entirely serviceable vision. There was even a possibility that I d get some vision back.    But there was a much better possibility that I wouldn t. There was nothing I could do -- no diet, no exercise, zilch -- to influence the outcome. Worse, the  stroke  revealed anatomical vulnerabilities that meant that my left eye was potentially in jeopardy, too, and there was no proven script for protecting it.    Certainly I should drink plenty of water, especially before bed, because dehydration causes or aggravates blood-pressure dips. Probably I should take a baby aspirin daily, to promote blood flow. Possibly I should avoid high altitudes, where oxygen is thin. Mostly I should pray.    I m not good at Hoahaoism. I m better at drama. I called eTd, my partner of more than nine years:  Would you still love me with a cane and a bad habit of bumping into things?  I called three of my best friends:  I m fat, I m old and now I m a Cyclops. Do you think there d be interest in the "[employer] rights?  I called my sister:  You have to give me your dog. But first you have to retrain him as a  for the blind.  My phone soon ran out of juice. My body was pumped full of it.      An estimated one million Americans are legally blind, meaning that their corrected vision is no better than 20/200. A few million more have life-altering vision impairment. Just a tiny minority of both groups were born that way. The rest lost their sight after seeing perfectly well. They almost always got some warning, followed by a period of waiting and  dread.    Macular degeneration. Glaucoma. Diabetic retinopathy. These are more common culprits than N.A.I.O.N., whose obscurity helps to explain the lack of progress in treating it. There s no huge market of potential patients for drug companies to profit from. No deep pool of test subjects to study.    Dr. Dill confirmed that I had N.A.I.O.N. a week after our first visit. On some people, its impact is subtle; they lose only peripheral vision. But the central vision in my right eye was compromised, in an unsubtle fashion. When I use that eye alone, I see the cloudy contours of things, and the nearer an object is, the thicker the clouds are. I can tell that a paragraph is here. But I can t make out a single word.     This is bad, isn t it?  I asked Dr. Dill.     This is bad,  she answered, then added, after an awkward pause:  I m sorry. I have nothing to offer you.  But wait, there was one thing: a clinical trial of an experimental treatment, and she could tell me how to get into it -- if I wanted to go that route. I did. So I soon added a new, time-consuming dimension to my already busy life. I became an ophthalmological guinea pig.    Ever looked at pictures of an optic nerve? I ve appraised a [place] s worth of them. And I can t get over how fragile it seems, this slender thread, fed by about a dozen minuscule blood vessels, that tethers the back of the eye to the brain and alone decides whether you get to see the setting of the sun or the rising of a soufflé.     I consider optic nerve damage the holy grail of solving blindness,  Adam Gonzalez, a professor of ophthalmology, neurology and neurosurgery at [facility] medical [employer], told me.  If you have somebody who s virtually completely blind from cataracts, we can cure that. Corneal damage? We can cure those patients. We can help a lot of patients with retinal disease. But there is really very little that we can do to restore vision that s  damaged from optic nerve disease.     Koki Arroyo, one of the neuro-ophthalmologists who monitor me during the clinical trial, told me to think of the nerve and its surrounding sheath as  a cable within a pipe.  My pipe, she explained, is a quarter of the normal size, so if the nerve swells -- as nerves do when bereft of oxygen -- it s more likely to press up against the pipe and be hurt.  Everything is congested,  she said.  Anatomically, we call it a disc at risk. I hate to use that term because it scares patients.     For the first month after my diagnosis, I d catch myself absent-mindedly rubbing my eyes, as everyone does, and terror would sizzle through me. Had I been too rough? Was my congested nerve still O.K.? During a run through the park, a megan of wind blew dirt into my left eye, and I panicked: I couldn t allow any injury to it. I no longer had a spare.    Nights were worst. If the left eye were going to quit on me, it would probably do so then. I quaffed two, three, four glasses of water just before my head hit the pillow. Superstitiously, I also took my baby aspirin then. If I somehow forgot to do either, I bolted out of bed, no matter how close I was to sleep, and made amends.    Then, in the middle of the night, when my bladder screamed, I hesitated before opening my eyes. What if I d had another  stroke ? It was the same every morning: a stab of suspense, then a gale-force sigh of relief. I could still see.    And I can still see. The oddity of my situation -- the emotional riddle -- is the distance between the manageability of my current circumstances and what tomorrow could bring. For the moment my handicaps are minor. I read a bit more slowly than I did and frequently get a pinched, deadened sensation behind my eyes. My typos have multiplied. My texting is a joke.    But extra determination and care compensate for most of that, and I ve learned that the best response to weakness is strength:  Prove to yourself what you can still accomplish. I had a column due three days after I woke up to my newly blurred vision. I wrote it on time -- and kept to my usual pace from then on.    I cut bait on just one of four speeches scheduled for the subsequent months. I devoured more books, not fewer, partly on the theory that I should take advantage of my vision while I had it, but also to train and reassure myself.    On a visit to [facility] I had a drink with a friend who works for [employer], a distinguished federal appeals court  there. I filled him in on my odyssey and assured him that I was O.K., except for my anxiety about the future -- about my independence, my competence. I m a writer, for heaven s sake. 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     If this hits my left eye,  I said to my friend,  it s game over.      Why?  he asked, then told me that  Dez was blind.    Dez, 73, welcomed me into his chambers a few weeks later.    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 He coped by not dwelling on that. He went to college and law school. He  and started a family. Then, around the age of 29, his vision began a sharp decline that would end in blindness six years later.    He and his wife, Marta, had two of their four children after that point. He adapted to his disability; his workplace adapted to him. Various digital advances -- in particular, [id number] technology -- helped hugely.  I m really looking forward to self-driving cars,  he laughed, emphasizing that innovation is transforming the lives of people without vision.    Of course he has regrets. In a speech last year for the [employer], he noted that while family members have become  true audible artists,  describing everything that they pass on nature walks,  I still can t see the clouds and flowers and potholes myself -- or my wife s  beautiful white hair.  He believes that he knows what his grandchildren look like, from the sounds of their voices and his memories of their parents, but he can t be sure.  Have I created it?  he wondered.    That same afternoon I met Jah Seymour, 43, the senior politics  for [employer]. 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     This is the important thing to remember: It s not your brain that s affected,  he told me.  It s your eyesight.     He added,  There are things much harder than this.     Harder than this: A friend of mine was recovering from a stroke before the age of 60. Another friend was grieving the death of a 39-year-old spouse. A mother in her mid-40s was questioning whether a rare cancer that she had battled for years was really and truly defeated.    I found myself taking inventory of the obstacles and upsets that people I knew were dealing with. There were children with autism. Parents with Alzheimer s. Financial crises. Career disasters. Addiction. Abuse.    And that was merely the stuff at the tip of my nose, in plain sight. How much else lurked beneath the surface? Show me someone with a seemingly unbroken stride and unfettered path. More often than not, he or she is hampered and haunted in ways that you can t imagine.    And despite my eye disorder, I m in clover: economic security; access to good health care; a durable relationship with a man whose face will delight me for as long as I m able to gaze at it, after which his voice, which I also adore, will do. What I m going through is what everyone endures as the years accumulate and the wear and tear starts to show. It s aging writ vivid and large. I m bumping  up against my limits. The trick is figuring out when to focus on them and when to look away.    I increased my disability insurance. I deleted Keshawn Herrera, with its high altitude, from my bucket list. I carved out the necessary hours and showed up for every eye appointment. The third injection was my last, and there s no meaningful sign that the treatment is salvaging my devastated nerve, as it s meant to. But it s too soon to reach any conclusions. I ll be under observation for eight more months.    And I ll try to put N.A.I.O.N. out of mind, apart from the water and aspirin. Yuval Robyn, 70, a filmmaker whose 2010 documentary,  Going Blind,  chronicles the slow worsening of his vision from glaucoma, told me that his best  was that  you cannot spend your life preparing for future losses.  It disrespects the blessings of the here and now. Besides, everyone lives in a state of uncertainty. Mine just has funky initials and fancy medical jargon attached to it.    I m no longer fit for summits, but I crested a modest hilltop on a run a few weeks ago. I wasn t after the view, but there it was, [place], gray and wavy and magnificent. I could see upstream. I could see downstream. Gio, happy me: I could see for miles and miles.            Follow-ups after your visit        Additional Services     Low Vision (OT) Referral       Low Vision: Bhakti Nelson                  Follow-up notes from your care team     Return for low vision - hang merlos.      Your next 10 appointments already scheduled     Apr 05, 2018  1:30 PM CDT   New Low Vision with Hang Merlos, OD   Eye Clinic ([facility])    Kyree Webster [facility] 9th Fl  Clinic 9a  [address] [address]   [phone]              Future tests that were ordered for you today     Open Future Orders        Priority Expected Expires Ordered    DILATED FUNDUS EXAM Routine  9/29/2019 3/28/2018            Who to contact      Please call your clinic at [phone] to:    Ask questions about your health    Make or cancel appointments    Discuss your medicines    Learn about your test results    Speak to your doctor            Additional Information About Your Visit        MyChart Information     [employer] is an electronic gateway that provides easy, online access to your medical records. With Meez, you can request a clinic appointment, read your test results, renew a prescription or communicate with your care team.     To sign up for Meez visit the website at [email or web address]   You will be asked to enter the access code listed below, as well as some personal information. Please follow the directions to create your username and password.     Your access code is: [email or web address]  Expires: 2018  6:30 AM     Your access code will  in 90 days. If you need help or a new code, please contact your [facility] or call [phone] for assistance.        Care EveryWhere ID     This is your Care EveryWhere ID. This could be used by other organizations to access your [facility] medical records  [id number]         Blood Pressure from Last 3 Encounters:   17 158/86   17 130/81   16 (!) 140/92    Weight from Last 3 Encounters:   17 82.8 kg (182 lb 8 oz)   17 85 kg (187 lb 8 oz)   16 81.6 kg (180 lb)              We Performed the Following     Glaucoma Top OU     IOP Measurement     Low Vision (OT) Referral        Primary Care Provider Office Phone # Fax #    Nicolette Carlson [phone]       [facility] [address] [address]        Equal Access to Services     [facility] Center[facility] : Hadii aad ku hadasho Soomaali, waaxda luqadaha, qaybta kaalmada adeshantell, paloma watt. So [facility] [phone].    ATENCIÓN: Si habla español, tiene a watt disposición servicios gratuitos de asistencia lingüística. Llame al  [phone].    We comply with applicable federal civil rights laws and [place] laws. We do not discriminate on the basis of race, color, national origin, age, disability, sex, sexual orientation, or gender identity.            Thank you!     Thank you for choosing E[facility]  for your care. Our goal is always to provide you with excellent care. Hearing back from our patients is one way we can continue to improve our services. Please take a few minutes to complete the written survey that you may receive in the mail after your visit with us. Thank you!             Your Updated Medication List - Protect others around you: Learn how to safely use, store and throw away your medicines at www.disposemymeds.org.          This list is accurate as of 3/29/18  2:23 PM.  Always use your most recent med list.                   Brand Name Dispense Instructions for use Diagnosis    acetaminophen 500 MG tablet    TYLENOL     Take 1,000 mg by mouth every 6 hours as needed        acetaminophen-codeine 300-30 MG per tablet    TYLENOL #3     TK 1 T PO  Q 4 H PRN. NM4        albuterol 108 (90 BASE) MCG/ACT Inhaler    PROAIR HFA/PROVENTIL HFA/VENTOLIN HFA    1 Inhaler    Inhale 2 puffs into the lungs every 6 hours as needed for shortness of breath / dyspnea or wheezing    Wheezing       amoxicillin-clavulanate 875-125 MG per tablet    AUGMENTIN     TK 1 T PO BID WC FOR 10 DAYS        azithromycin 250 MG tablet    ZITHROMAX          BEVESPI AEROSPHERE 9-4.8 MCG/ACT oral inhaler   Generic drug:  Glycopyrrolate-Formoterol      INL 2 PFS PO BID IN THE MORNING AND IN THE JUNE        CENTRUM SILVER per tablet      Take 1 tablet by mouth daily        cetirizine 10 MG tablet    zyrTEC     TK 1 T PO D        chlorhexidine 0.12 % solution    PERIDEX     SWISH 15 ML PO FOR 1 MINUTE AND EXPECTORATE QAM AND QHS FOR 7 DAYS        citalopram 10 MG tablet    celeXA     Take 10 mg by mouth        clopidogrel 75 MG tablet    PLAVIX     Take 75 mg by  mouth daily        diazepam 5 MG tablet    VALIUM          guaiFENesin-codeine 100-10 MG/5ML Soln solution    ROBITUSSIN AC    180 mL    Take 5 mLs by mouth every 4 hours as needed for cough    COPD exacerbation (H)       hydrochlorothiazide 12.5 MG capsule    MICROZIDE     TK ONE C PO  QAM TO  LOWER BP AND REMOVE FLUID        LYCOPENE PO      Take 1 tablet by mouth        * metoprolol succinate 50 MG 24 hr tablet    TOPROL-XL     TK 1 T PO QD        * metoprolol succinate 100 MG 24 hr tablet    TOPROL-XL     Take 100 mg by mouth daily        NASACORT AQ 55 MCG/ACT Inhaler   Generic drug:  triamcinolone      Spray 1 spray into both nostrils daily        nicotine 14 MG/24HR 24 hr patch    NICODERM CQ    30 patch    Place 1 patch onto the skin every 24 hours    COPD exacerbation (H)       * PRAVACHOL 20 MG tablet   Generic drug:  pravastatin      Take 80 mg by mouth every evening        * pravastatin 80 MG tablet    PRAVACHOL     TK 1 T PO QD WITH THE JUNE MEAL        * predniSONE 20 MG tablet    DELTASONE     TK 1 T PO QD WAC        * predniSONE 10 MG tablet    DELTASONE    9 tablet    Take 2 tabs (20 mg) by mouth daily x 3 days, 1 tabs (10 mg) daily x 3 days then stop 1    COPD exacerbation (H)       traZODone 50 MG tablet    DESYREL     TK 1 T PO QHS prn        triamcinolone 0.1 % cream    KENALOG     APPLY TOPICALLY BID TO RASH UTD        vitamin D3 1000 UNITS Caps      Take 1,000 Units by mouth daily        * Notice:  This list has 6 medication(s) that are the same as other medications prescribed for you. Read the directions carefully, and ask your doctor or other care provider to review them with you.

## 2018-03-29 NOTE — PATIENT INSTRUCTIONS
[email or web address]    They say that death comes like a thief in the night. Lesser vandals have the same M.O. The affliction that stole my vision, or at least a big chunk of it, did so as I slept. I went to bed seeing the world one way. I woke up seeing it another.    This was about four months ago, though it feels like an eternity. So much has happened since. I don t mean all the tests and procedures: the vials upon vials of blood; the mapping of major arteries in my neck; the imaging of tiny vessels in my brain; the first injection of an experimental treatment (or, maybe, a placebo) into my right, dominant eye, where the damage occurred; then the second injection; and then, last week, the third.    I mean the rest of it. I went to bed believing that I was more or less in control   that the unfinished business, unrealized dreams and other disappointments in my life were essentially failures of industry and imagination, and could probably be redeemed with a fierce enough effort. I woke up to the realization of how ludicrous that was.    So I flailed on two fronts. I tried to grow accustomed, day by disorienting day, to reading and typing with a thick, dappled fog across the right half of my field of vision, which was sometimes tilted and off-kilter. I felt drunk without being drunk, dizzy but not exactly dizzy.    I also fought not to be angry and afraid, a struggle familiar to anyone with a significant illness or disability. The fear arose less from what I d already lost than from what I might lose still. Over the next five years, there s a roughly 20 percent chance that what happened to my right eye could also happen to my left. I could go blind.    The odds are on my side. But the stakes are enormous. So how am I supposed to process this? Where on the spectrum of optimism to resignation   of hope to dread   do I position myself?      On that first morning, several  hours passed before I accepted that something was seriously wrong. I figured that I was just groggier than usual. Maybe I needed more coffee.    As I sat at my computer transcribing a long interview that I d just done, I repeatedly took off my eyeglasses to clean them, convinced that the smudginess of my vision was some streakiness on the lenses. When I finally gave up on that, I rinsed my eyes with water   to no avail. It was as if someone had deposited a blob of petroleum jelly in my right eye and nothing would dislodge it.    The next morning I visited my ophthalmologist, who for many years had kept tabs on my run-of-the-mill astigmatism and fine-tuned my prescription so that I saw 20-20 with each eye. He said that in this case I needed a neuro-ophthalmologist. (I didn t know that such a specialty existed.) I found one, Herminia Dill, who could squeeze me in a few days later, and after three tedious hours of staring at or into charts and colored patterns and sophisticated machines, she told me this, in a rat-tat-tat fashion:    I had almost certainly experienced what is colloquially called  a stroke of the eye,  whereby the optic nerve is ravaged by a brief reduction of blood flow and thus oxygen. The name for this condition is nonarteritic anterior ischemic optic neuropathy (N.A.I.O.N.), and it afflicts perhaps one in 10,000 Americans. But I d need extensive blood work and tests to rule out other possibilities.    This tends to occur after the age of 50. (I m 51.) It typically strikes during sleep, when blood pressure drops, and is sometimes associated with sleep apnea, diabetes, hypertension or the use of pills for erectile dysfunction   none of which applied to me. I was a mystery.    I would be surprised, Dr. Dill said, by my brain s eventual adjustment. It would edit my right eye out of the equation so that my left eye could guide me on its own, leaving me with entirely serviceable vision. There was even  a possibility that I d get some vision back.    But there was a much better possibility that I wouldn t. There was nothing I could do   no diet, no exercise, zilch   to influence the outcome. Worse, the  stroke  revealed anatomical vulnerabilities that meant that my left eye was potentially in jeopardy, too, and there was no proven script for protecting it.    Certainly I should drink plenty of water, especially before bed, because dehydration causes or aggravates blood-pressure dips. Probably I should take a baby aspirin daily, to promote blood flow. Possibly I should avoid high altitudes, where oxygen is thin. Mostly I should pray.    I m not good at Latter day. I m better at drama. I called Ted, my partner of more than nine years:  Would you still love me with a cane and a bad habit of bumping into things?  I called three of my best friends:  I m fat, I m old and now I m a Cyclops. Do you think there d be interest in the LATTO rights?  I called my sister:  You have to give me your dog. But first you have to retrain him as a  for the blind.  My phone soon ran out of juice. My body was pumped full of it.      An estimated one million Americans are legally blind, meaning that their corrected vision is no better than 20/200. A few million more have life-altering vision impairment. Just a tiny minority of both groups were born that way. The rest lost their sight after seeing perfectly well. They almost always got some warning, followed by a period of waiting and dread.    Macular degeneration. Glaucoma. Diabetic retinopathy. These are more common culprits than N.A.I.O.N., whose obscurity helps to explain the lack of progress in treating it. There s no huge market of potential patients for drug companies to profit from. No deep pool of test subjects to study.    Dr. Dill confirmed that I had N.A.I.O.N. a week after our first visit. On some people, its impact is subtle; they lose only peripheral vision. But  the central vision in my right eye was compromised, in an unsubtle fashion. When I use that eye alone, I see the cloudy contours of things, and the nearer an object is, the thicker the clouds are. I can tell that a paragraph is here. But I can t make out a single word.     This is bad, isn t it?  I asked Dr. Dill.     This is bad,  she answered, then added, after an awkward pause:  I m sorry. I have nothing to offer you.  But wait, there was one thing: a clinical trial of an experimental treatment, and she could tell me how to get into it   if I wanted to go that route. I did. So I soon added a new, time-consuming dimension to my already busy life. I became an ophthalmological guinea pig.    Ever looked at pictures of an optic nerve? I ve appraised a [place] s worth of them. And I can t get over how fragile it seems, this slender thread, fed by about a dozen minuscule blood vessels, that tethers the back of the eye to the brain and alone decides whether you get to see the setting of the sun or the rising of a soufflé.     I consider optic nerve damage the holy grail of solving blindness,  Adam Gonzalez, a professor of ophthalmology, neurology and neurosurgery at [employer], told me.  If you have somebody who s virtually completely blind from cataracts, we can cure that. Corneal damage? We can cure those patients. We can help a lot of patients with retinal disease. But there is really very little that we can do to restore vision that s damaged from optic nerve disease.     Koki Arroyo, one of the neuro-ophthalmologists who monitor me during the clinical trial, told me to think of the nerve and its surrounding sheath as  a cable within a pipe.  My pipe, she explained, is a quarter of the normal size, so if the nerve swells   as nerves do when bereft of oxygen   it s more likely to press up against the pipe and be hurt.  Everything is congested,  she said.  Anatomically, we call it a  disc at risk. I hate to use that term because it scares patients.     For the first month after my diagnosis, I d catch myself absent-mindedly rubbing my eyes, as everyone does, and terror would sizzle through me. Had I been too rough? Was my congested nerve still O.K.? During a run through the park, a megan of wind blew dirt into my left eye, and I panicked: I couldn t allow any injury to it. I no longer had a spare.    Nights were worst. If the left eye were going to quit on me, it would probably do so then. I quaffed two, three, four glasses of water just before my head hit the pillow. Superstitiously, I also took my baby aspirin then. If I somehow forgot to do either, I bolted out of bed, no matter how close I was to sleep, and made amends.    Then, in the middle of the night, when my bladder screamed, I hesitated before opening my eyes. What if I d had another  stroke ? It was the same every morning: a stab of suspense, then a gale-force sigh of relief. I could still see.    And I can still see. The oddity of my situation   the emotional riddle   is the distance between the manageability of my current circumstances and what tomorrow could bring. For the moment my handicaps are minor. I read a bit more slowly than I did and frequently get a pinched, deadened sensation behind my eyes. My typos have multiplied. My texting is a joke.    But extra determination and care compensate for most of that, and I ve learned that the best response to weakness is strength: Prove to yourself what you can still accomplish. I had a column due three days after I woke up to my newly blurred vision. I wrote it on time   and kept to my usual pace from then on.    I cut bait on just one of four speeches scheduled for the subsequent months. I devoured more books, not fewer, partly on the theory that I should take advantage of my vision while I had it, but also to train and reassure myself.    On a visit to [place], [place], I had a  drink with a friend who works for [employer], a distinguished federal appeals court  there. I filled him in on my odyssey and assured him that I was O.K., except for my anxiety about the future   about my independence, my competence. I m a writer, for heaven s sake. 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     If this hits my left eye,  I said to my friend,  it s game over.      Why?  he asked, then told me that  Dez was blind.    Dez, 73, welcomed me into his chambers a few weeks later.    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 He coped by not dwelling on that. He went to college and law school. He  and started a family. Then, around the age of 29, his vision began a sharp decline that would end in blindness six years later.    He and his wife, Marta, had two of their four children after that point. He adapted to his disability; his workplace adapted to him. Various digital advances   in particular, fogu-ag-zbougc technology   helped hugely.  I m really looking forward to self-driving cars,  he laughed, emphasizing that innovation is transforming the lives of people without vision.    Of course he has regrets. In a speech last year for the [employer], he noted that while family members have become  true audible artists,  describing everything that they pass on nature walks,  I still can t see the clouds and flowers and potholes myself   or my wife s beautiful white hair.  He believes that he knows what his grandchildren look like, from the sounds of their voices and his memories of their parents, but he can t be sure.  Have I created it?  he wondered.    That same afternoon I met Jah Seymour, 43, the senior politics  for [employer]. 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     This is the important thing to remember: It s not your brain that s affected,  he told me.  It s your eyesight.     He added,  There are things much harder than this.     Harder than this: A friend of mine was recovering from a stroke before the age of 60. Another friend was grieving the death of a 39-year-old spouse. A mother in her mid-40s was questioning whether a rare cancer that she had battled for years was really and truly defeated.    I found myself taking inventory of the obstacles and upsets that people I knew were dealing with. There were children with autism. Parents with Alzheimer s. Financial crises. Career disasters. Addiction. Abuse.    And that was merely the stuff at the tip of my nose, in plain sight. How much else lurked beneath the surface? Show me someone with a seemingly unbroken stride and unfettered path. More often than not, he or she is hampered and haunted in ways that you can t imagine.    And despite my eye disorder, I m in clover: economic security; access to good health care; a durable relationship with a man whose face will delight me for as long as I m able to gaze at it, after which his voice, which I also adore, will do. What I m going through is what everyone endures as the years accumulate and the wear and tear starts to show. It s aging writ vivid and large. I m bumping up against my limits. The trick is figuring out when to focus on them and when to look away.    I increased my disability insurance. I deleted Keshawn Herrera, with its high altitude, from my bucket list. I carved out the necessary hours and showed up for every eye appointment. The third injection was my last, and there s no meaningful sign that the treatment is salvaging my devastated nerve, as it s meant to. But it s too soon to reach any conclusions. I ll  be under observation for eight more months.    And I ll try to put N.A.I.O.N. out of mind, apart from the water and aspirin. Yuval Carlson, 70, a filmmaker whose 2010 documentary,  Going Blind,  chronicles the slow worsening of his vision from glaucoma, told me that his best  was that  you cannot spend your life preparing for future losses.  It disrespects the blessings of the here and now. Besides, everyone lives in a state of uncertainty. Mine just has funky initials and fancy medical jargon attached to it.    I m no longer fit for summits, but I crested a modest hilltop on a run a few weeks ago. I wasn t after the view, but there it was, [place], gray and wavy and magnificent. I could see upstream. I could see downstream. Gio, happy me: I could see for miles and miles.

## 2018-03-29 NOTE — PROGRESS NOTES
Assessment & Plan     Emmanuel Clinton is a 72 year old male with the following diagnoses:   1. AION (acute ischemic optic neuropathy)       Sequential NAION left eye followed by right eye on 2/12/18. He did receive steroid injection with Dr. Moreira after our last visit. He reports stable vision without improvement.     Optic disc edema has since resolved. visual field stable with inferior altitudinal defect right eye, central scotoma with inferior altitudinal defect left eye.    Will refer to Low Vision. Will not recommend second steroid injection.         Attending Physician Attestation:  Complete documentation of historical and exam elements from today's encounter can be found in the full encounter summary report (not reduplicated in this progress note).  I personally obtained the chief complaint(s) and history of present illness.  I confirmed and edited as necessary the review of systems, past medical/surgical history, family history, social history, and examination findings as documented by others; and I examined the patient myself.  I personally reviewed the relevant tests, images, and reports as documented above.  I formulated and edited as necessary the assessment and plan and discussed the findings and management plan with the patient and family. - Dayron Esquivel MD  PGY3, Dept of Ophthalmology  Pager [phone]

## 2018-04-05 ENCOUNTER — OFFICE VISIT (OUTPATIENT)
Dept: OPTOMETRY | Facility: CLINIC | Age: 73
End: 2018-04-05
Payer: COMMERCIAL

## 2018-04-05 DIAGNOSIS — H47.013 AION (ACUTE ISCHEMIC OPTIC NEUROPATHY), BILATERAL: ICD-10-CM

## 2018-04-05 DIAGNOSIS — H54.2X12: Primary | ICD-10-CM

## 2018-04-05 DIAGNOSIS — H52.4 PRESBYOPIA: ICD-10-CM

## 2018-04-05 ASSESSMENT — REFRACTION_MANIFEST
OD_ADD: +4.00
OS_SPHERE: -1.00
OD_SPHERE: -1.25
OS_CYLINDER: +1.00
OD_CYLINDER: SPHERE
OS_AXIS: 015
OS_ADD: +4.00

## 2018-04-05 ASSESSMENT — EXTERNAL EXAM - RIGHT EYE: OD_EXAM: NORMAL

## 2018-04-05 ASSESSMENT — SLIT LAMP EXAM - LIDS
COMMENTS: NORMAL
COMMENTS: NORMAL

## 2018-04-05 ASSESSMENT — TONOMETRY
OS_IOP_MMHG: 15
IOP_METHOD: ICARE
OD_IOP_MMHG: 14

## 2018-04-05 ASSESSMENT — VISUAL ACUITY
OS_SC: 20/500
OD_SC: 20/400
METHOD: SNELLEN - LINEAR

## 2018-04-05 ASSESSMENT — CONF VISUAL FIELD
OD_INFERIOR_NASAL_RESTRICTION: 1
OS_INFERIOR_NASAL_RESTRICTION: 3
OS_INFERIOR_TEMPORAL_RESTRICTION: 3
OD_INFERIOR_TEMPORAL_RESTRICTION: 1
OS_SUPERIOR_TEMPORAL_RESTRICTION: 1

## 2018-04-05 ASSESSMENT — EXTERNAL EXAM - LEFT EYE: OS_EXAM: NORMAL

## 2018-04-05 NOTE — MR AVS SNAPSHOT
After Visit Summary   2018    Emmanuel Clinton    MRN: [id number]           Patient Information     Date Of Birth          1945        Visit Information        Provider Department      2018 1:30 PM Hang Merlos, OD [facility]        Today's Diagnoses     Low vision right eye category 1, low vision left eye category 2    -  1    AION (acute ischemic optic neuropathy), bilateral        Presbyopia           Follow-ups after your visit        Follow-up notes from your care team     Return in about 1 year (around 2019), or if symptoms worsen or fail to improve, for Refraction.      Your next 10 appointments already scheduled     2018  2:00 PM CDT   L/Vision Eval with Paz Menchaca OT   [facility] ([facility])    [address] [place] [id number] [phone]              Who to contact     Please call your clinic at [phone] to:    Ask questions about your health    Make or cancel appointments    Discuss your medicines    Learn about your test results    Speak to your doctor            Additional Information About Your Visit        [employer] Information     [employer] is an electronic gateway that provides easy, online access to your medical records. With [employer], you can request a clinic appointment, read your test results, renew a prescription or communicate with your care team.     To sign up for [employer] visit the website at [email or web address]   You will be asked to enter the access code listed below, as well as some personal information. Please follow the directions to create your username and password.     Your access code is: [email or web address]  Expires: 2018  6:30 AM     Your access code will  in 90 days. If you need help or a new code, please contact your [facility] or call [phone] for assistance.        Care EveryWhere ID     This is your Care EveryWhere ID.  This could be used by other organizations to access your [facility] medical records  [id number]         Blood Pressure from Last 3 Encounters:   12/30/17 158/86   11/26/17 130/81   08/01/16 (!) 140/92    Weight from Last 3 Encounters:   12/29/17 82.8 kg (182 lb 8 oz)   11/26/17 85 kg (187 lb 8 oz)   08/01/16 81.6 kg (180 lb)              We Performed the Following     REFRACTION [[id number]]        Primary Care Provider Office Phone # Fax #    Nicolette Carlson [phone]       [facility] [address] [facility] [address]        Equal Access to Services     DIXIE AREVALO : Hadii mikey brown hadasho Soomaali, waaxda luqadaha, qaybta kaalmada adeegyada, paloma alonzo . So [facility] [phone].    ATENCIÓN: Si habla español, tiene a watt disposición servicios gratuitos de asistencia lingüística. Llame al [phone].    We comply with applicable federal civil rights laws and [place] laws. We do not discriminate on the basis of race, color, national origin, age, disability, sex, sexual orientation, or gender identity.            Thank you!     Thank you for choosing [facility]  for your care. Our goal is always to provide you with excellent care. Hearing back from our patients is one way we can continue to improve our services. Please take a few minutes to complete the written survey that you may receive in the mail after your visit with us. Thank you!             Your Updated Medication List - Protect others around you: Learn how to safely use, store and throw away your medicines at www.disposemymeds.org.          This list is accurate as of 4/5/18  2:21 PM.  Always use your most recent med list.                   Brand Name Dispense Instructions for use Diagnosis    acetaminophen 500 MG tablet    TYLENOL     Take 1,000 mg by mouth every 6 hours as needed        acetaminophen-codeine 300-30 MG per tablet    TYLENOL #3     TK 1 T PO  Q 4 H PRN. NM4        albuterol 108 (90 BASE)  MCG/ACT Inhaler    PROAIR HFA/PROVENTIL HFA/VENTOLIN HFA    1 Inhaler    Inhale 2 puffs into the lungs every 6 hours as needed for shortness of breath / dyspnea or wheezing    Wheezing       amoxicillin-clavulanate 875-125 MG per tablet    AUGMENTIN     TK 1 T PO BID WC FOR 10 DAYS        azithromycin 250 MG tablet    ZITHROMAX          BEVESPI AEROSPHERE 9-4.8 MCG/ACT oral inhaler   Generic drug:  Glycopyrrolate-Formoterol      INL 2 PFS PO BID IN THE MORNING AND IN THE JUNE        CENTRUM SILVER per tablet      Take 1 tablet by mouth daily        cetirizine 10 MG tablet    zyrTEC     TK 1 T PO D        chlorhexidine 0.12 % solution    PERIDEX     SWISH 15 ML PO FOR 1 MINUTE AND EXPECTORATE QAM AND QHS FOR 7 DAYS        citalopram 10 MG tablet    celeXA     Take 10 mg by mouth        clopidogrel 75 MG tablet    PLAVIX     Take 75 mg by mouth daily        diazepam 5 MG tablet    VALIUM          guaiFENesin-codeine 100-10 MG/5ML Soln solution    ROBITUSSIN AC    180 mL    Take 5 mLs by mouth every 4 hours as needed for cough    COPD exacerbation (H)       hydrochlorothiazide 12.5 MG capsule    MICROZIDE     TK ONE C PO  QAM TO  LOWER BP AND REMOVE FLUID        LYCOPENE PO      Take 1 tablet by mouth        * metoprolol succinate 50 MG 24 hr tablet    TOPROL-XL     TK 1 T PO QD        * metoprolol succinate 100 MG 24 hr tablet    TOPROL-XL     Take 100 mg by mouth daily        NASACORT AQ 55 MCG/ACT Inhaler   Generic drug:  triamcinolone      Spray 1 spray into both nostrils daily        nicotine 14 MG/24HR 24 hr patch    NICODERM CQ    30 patch    Place 1 patch onto the skin every 24 hours    COPD exacerbation (H)       * PRAVACHOL 20 MG tablet   Generic drug:  pravastatin      Take 80 mg by mouth every evening        * pravastatin 80 MG tablet    PRAVACHOL     TK 1 T PO QD WITH THE JUNE MEAL        * predniSONE 20 MG tablet    DELTASONE     TK 1 T PO QD WAC        * predniSONE 10 MG tablet    DELTASONE    9 tablet     Take 2 tabs (20 mg) by mouth daily x 3 days, 1 tabs (10 mg) daily x 3 days then stop 1    COPD exacerbation (H)       traZODone 50 MG tablet    DESYREL     TK 1 T PO QHS prn        triamcinolone 0.1 % cream    KENALOG     APPLY TOPICALLY BID TO RASH UTD        vitamin D3 1000 UNITS Caps      Take 1,000 Units by mouth daily        * Notice:  This list has 6 medication(s) that are the same as other medications prescribed for you. Read the directions carefully, and ask your doctor or other care provider to review them with you.

## 2018-04-05 NOTE — PROGRESS NOTES
Assessment/Plan  (H54.2X12) Low vision right eye category 1, low vision left eye category 2  (primary encounter diagnosis)  Comment: Visual acuity OD improved from 20/400 to 20/100+ today with refraction. OS showed minimal improvement  Plan: Discussed findings with patient. Patient has upcoming Low Vision OT evaluation in about 2 weeks. Patient would appear to benefit from full time wear of glasses, but additional magnification will realistically be necessary for him to read fluently. Patient did show some improvement in office today with low-power handheld magnifier, but a CCTV would appear to offer assistance in contrast/variable magnification that an optical magnifier will not provide. Given significant improvement in patient's distance vision with glasses, FTW of specs is recommended. Patient should RTC with any prolonged difficulty adapting to lenses.     (H47.013) AION (acute ischemic optic neuropathy), bilateral  Plan: Patient should continue following recommendations of [facility] ophthalmology service. Patient advised to call/RTC with any changes to vision.     (H52.4) Presbyopia  Plan: See above. SRx updated and released.       Complete documentation of historical and exam elements from today's encounter can  be found in the full encounter summary report (not reduplicated in this progress  note). I personally obtained the chief complaint(s) and history of present illness. I  confirmed and edited as necessary the review of systems, past medical/surgical  history, family history, social history, and examination findings as documented by  others; and I examined the patient myself. I personally reviewed the relevant tests,  images, and reports as documented above. I formulated and edited as necessary the  assessment and plan and discussed the findings and management plan with the  patient and family.    Hang Merlos, OD

## 2018-04-05 NOTE — LETTER
4/5/2018      RE: Emmanuel Clinton  [address]  [facility] [id number]       Assessment/Plan  (H54.2X12) Low vision right eye category 1, low vision left eye category 2  (primary encounter diagnosis)  Comment: Visual acuity OD improved from 20/400 to 20/100+ today with refraction. OS showed minimal improvement  Plan: Discussed findings with patient. Patient has upcoming Low Vision OT evaluation in about 2 weeks. Patient would appear to benefit from full time wear of glasses, but additional magnification will realistically be necessary for him to read fluently. Patient did show some improvement in office today with low-power handheld magnifier, but a CCTV would appear to offer assistance in contrast/variable magnification that an optical magnifier will not provide. Given significant improvement in patient's distance vision with glasses, FTW of specs is recommended. Patient should RTC with any prolonged difficulty adapting to lenses.     (H47.013) AION (acute ischemic optic neuropathy), bilateral  Plan: Patient should continue following recommendations of [facility] ophthalmology service. Patient advised to call/RTC with any changes to vision.     (H52.4) Presbyopia  Plan: See above. SRx updated and released.       Complete documentation of historical and exam elements from today's encounter can  be found in the full encounter summary report (not reduplicated in this progress  note). I personally obtained the chief complaint(s) and history of present illness. I  confirmed and edited as necessary the review of systems, past medical/surgical  history, family history, social history, and examination findings as documented by  others; and I examined the patient myself. I personally reviewed the relevant tests,  images, and reports as documented above. I formulated and edited as necessary the  assessment and plan and discussed the findings and management plan with the  patient and family.    Hang Merlos, OD

## 2018-04-24 ENCOUNTER — HOSPITAL ENCOUNTER (OUTPATIENT)
Dept: OCCUPATIONAL THERAPY | Facility: CLINIC | Age: 73
Setting detail: THERAPIES SERIES
End: 2018-04-24
Attending: OPHTHALMOLOGY
Payer: MEDICARE

## 2018-04-24 PROCEDURE — 97167 OT EVAL HIGH COMPLEX 60 MIN: CPT | Mod: GO | Performed by: REHABILITATION PRACTITIONER

## 2018-04-24 PROCEDURE — 40000249 ZZH STATISTIC VISIT LOW VISION CLINIC: Performed by: REHABILITATION PRACTITIONER

## 2018-04-24 PROCEDURE — 97535 SELF CARE MNGMENT TRAINING: CPT | Mod: GO | Performed by: REHABILITATION PRACTITIONER

## 2018-04-24 PROCEDURE — G8987 SELF CARE CURRENT STATUS: HCPCS | Mod: GO,CL | Performed by: REHABILITATION PRACTITIONER

## 2018-04-24 PROCEDURE — G8988 SELF CARE GOAL STATUS: HCPCS | Mod: GO,CK | Performed by: REHABILITATION PRACTITIONER

## 2018-04-24 ASSESSMENT — VISUAL ACUITY
OD: 20/250
OS: 20/500

## 2018-04-24 ASSESSMENT — ACTIVITIES OF DAILY LIVING (ADL): PRIOR_ADL/IADL_STATUS: INDEPENDENT PRIOR TO ONSET

## 2018-04-25 NOTE — PROGRESS NOTES
04/24/18 1400   Visit Type   Type of Visit Initial   General Information   Start Of Care Date 04/24/18   Referring Physician Dr. Natalie Moreira MD   Orders Evaluate And Treat As Indicated   Date of Order 03/29/18   Medical Diagnosis Bilateral non-arteritic anterior ischemic optic neuropathy   Onset Of Illness/injury Or Date Of Surgery 2/12/18   Surgical/Medical history reviewed Yes   Precautions/Limitations 2/12/18   Additional Occupational Profile Info/Pertinent History of Current Problem Pt woke with significant loss of vision in Febraury, 2018 and was found to have non-arteritic anterior ischemic optic neuropathy.  Pt had steroid injections in both eyes.  Pt had cataract surgery 2 years prior to loss of vision.     Prior ADL/IADL status Independent prior to onset   Prior Status Comment Pt was working part time, driving and very active in the community.   Others present at visit Spouse/significant other  (Mirta)   Patient/family Goals Statement Be able to read the mail, newspaper, return to work, read menu, see TV   General information comments Pt reports having to quit his job as a result of his new onset of vision loss.    Social History/Home Environment   Living Environment [place]   Patient Role/employment History  Employed   Social/Environment Comment Wife assists with driving, 2 stairs into home   Fall Risk Screen   Fall screen completed by OT   Have you fallen 2 or more times in the past year? No   Have you fallen and had an injury in the past year? Yes   Cognitive/Behavioral   Communication Intact   Cognitive Status Intact for evaluation process   Behavior Appropriate   Patient/family aware of diagnosis Yes   Vision related restrictions on visiting with family/friends Moderate   Reported emotional impact of visual impairment Moderate   Adjustment to disability Good   Cognitive/Behavioral Comment Pt has a good sense of humor, however, is able to verbalize that he has eliminated most of his  leisure tasks and work tasks as a result of his vision loss.    Physical Status/Equipment   Physical Status Impaired balance;Weakness   Physical Status/Equipment comments Cane   Visual Report   Functional Complaints Reading;Writing;Work related tasks;Safety in mobility   Visual Complaints Light sensitivity;Scotoma Hindrance right eye;Scotoma Hindrance left eye;Visual fatigue;Difficulty maintaining focus   Complaints comments Unable to see faces   Reading glasses Bifocal   Equipment comments Not able to see the TV   Lighting and Glare   Is glare a problem? Yes/ outdoors   Visual Acuity   Acuity right eye 20/250   Acuity left eye 20/500   Contrast Sensitivity   Contrast sensitivity (score/25) 5/25   MN Read   Smallest print size read 6.3 at 30 cm   Critical print size 6.3M   Words per minute at critical print size 80.1, pt reports inability to recall what was read due to effor when reading   Functional Reading Screen   Identifies medication bottles No   Reads bills No   Reads recipes No   Reads directions No   SRAFVP SCORING   # relevant measures 34   Composite score 20   Percentage of disability (%) 70.59   Education   Learner Patient;Family   Readiness Acceptance   Method Explanation   Response Verbalizes understanding;Needs reinforcement   Education Notes Role of Low vision OT   Clinical Impression, OT Eval   Criteria for Skilled Therapeutic Interventions Met yes;treatment indicated   Therapy  Diagnosis: Impaired ADL/IADL with deficits in Reading based ADL;Written communication;Home management;Grooming;Work performance   Impairment comments Pt's vision loss has greatly impacted his independence with ADL's and IADL tasks.     Assessment of Occupational Performance 5 or more Performance Deficits   Identified Performance Deficits Leisure, ADL/IADL, community reintegration, work related tasks, reading based ADL, writing based ADL, safety with mobility   Clinical Decision Making (Complexity) High complexity   Clinical  Impression Comments Pt has complex bilateral acute onset of vision loss impacting his independence and safety.  Evaluation modified as a result of vision loss.   OT Visual Rehabilitation Evaluation Plan   Therapy Plan Occupational therapy intervention   Planned Interventions Scotoma awareness;Visual field loss awareness;Visual skills training for near tasks;Visual skills training for safety in mobility;Visual skills training for location of objects;Low vision compensatory training for reading;Low vision compensatory training for written communication;Low vision compensatory training for object identification;Instruction in environmental adaptations for glare;Instruction in environmental adaptations for contrast;Instruction in environmental adaptations for lighting;Optical device/ADL device instruction and training;Computer modifications;Instruction in community resources   Risks and Benefits of Treatment have been explained. Yes   Patient, Family in agreement with plan of care Yes   GOALS   Goals Near Vision;Visual Field;Environmental Modification;Resource Education;Distance Viewing;7;8   Goal 1 - Near Vision   Goal Description: Near Vision Patient will verbalize awareness of visual field Loss and demonstrate improved use of visual skills/adaptive equipment for increased independence in reading-based activities of daily living, written communication and detail ADL tasks.   Target Date 07/17/18   Goal 2 - Visual field   Goal Description: Visual field Patient will verbalize awareness of visual field loss and demonstrate improved use of visual skills for increased safety/ independence in locating objects/obstacles and in navigation   Target Date 07/17/18   Goal 3 - Environmental modification   Goal Description: Environment modification Patient will demonstrate understanding of the impact of lighting, contrast and glare on ADL activities, in conjunction with environmentally-based ADL modifications   Target Date 07/17/18    Goal 4 - Resource education   Goal Description: Resource education Patient will verbalize awareness of community resources for the following:;Audio access to print materials;Access to large print materials;Access to low vision devices;Transportation alternatives;Support in vocational goals;Telephone services   Target Date 07/17/18   Goal 5 - Distance viewing   Goal Description: Distance viewing Patient will demonstrate proficient use of a distance device in conjunction with appropriate visual skills techniques to correctly survey objects in the distance   Target Date 07/17/18   Goal 7   Goal Description Pt will be able to cut/file nails with mod I and use of environmental modifications and AE.   Target Date 07/17/18   Goal 8   Goal Description Pt will identify 1-2 volunteer/vocational resources to participate in 1 day a week to improve participation in community activities.    Target Date 07/17/18   Total Evaluation Time   Total Evaluation Time 20   Therapy Certification   Certification date from 04/24/18   Certification date to 07/17/18   Medical Diagnosis Severe vision loss   Certification I certify the need for these services furnished under this plan of treatment and while under my care.  (Physician co-signature of this document indicates review and certification of the therapy plan).

## 2018-04-25 NOTE — PROGRESS NOTES
[facility]          OUTPATIENT OCCUPATIONALTHERAPY  EVALUATION  PLAN OF TREATMENT FOR OUTPATIENT REHABILITATION  (COMPLETE FOR INITIAL CLAIMS ONLY)  Patient's Last Name, First Name, M.I.  YOB: 1945  Emmanuel Clinton      Provider's Name  [facility] Medical Record No.  [id number]   Onset Date:  2/12/18 Start of Care Date:  04/24/18   Type:     ___PT  _X_OT   ___SLP Medical Diagnosis:  Severe vision loss   Therapy Diagnosis: Impaired ADL/IADL with deficits in Reading based ADL, Written communication, Home management, Grooming, Work performance  Pt's vision loss has greatly impacted his independence with ADL's and IADL tasks.   Visits from SOC: 1     _________________________________________________________________________________  Plan of Treatment/Functional Goals:  Planned Interventions: Scotoma awareness, Visual field loss awareness, Visual skills training for near tasks, Visual skills training for safety in mobility, Visual skills training for location of objects, Low vision compensatory training for reading, Low vision compensatory training for written communication, Low vision compensatory training for object identification, Instruction in environmental adaptations for glare, Instruction in environmental adaptations for contrast, Instruction in environmental adaptations for lighting, Optical device/ADL device instruction and training, Computer modifications, Instruction in community resources        Goals  1. Patient will verbalize awareness of visual field Loss and demonstrate improved use of visual skills/adaptive equipment for increased independence in reading-based activities of daily living, written communication and detail ADL tasks.         Target Date: 07/17/18      2. Patient will verbalize awareness of visual field loss and demonstrate improved use of  visual skills for increased safety/ independence in locating objects/obstacles and in navigation         Target Date: 07/17/18      3.  Patient will demonstrate understanding of the impact of lighting, contrast and glare on ADL activities, in conjunction with environmentally-based ADL modifications         Target Date: 07/17/18      4. Patient will verbalize awareness of community resources for the following:, Audio access to print materials, Access to large print materials, Access to low vision devices, Transportation alternatives, Support in vocational goals, Telephone services         Target Date: 07/17/18      5. Patient will demonstrate proficient use of a distance device in conjunction with appropriate visual skills techniques to correctly survey objects in the distance          Target Date: 07/17/18      6.                    7.  Pt will be able to cut/file nails with mod I and use of environmental modifications and AE.     Target Date: 07/17/18        8. Pt will identify 1-2 volunteer/vocational resources to participate in 1 day a week to improve participation in community activities.     Target Date: 07/17/18                     Therapy Frequency/Duration:       Paz Menchaca, OT       I CERTIFY THE NEED FOR THESE SERVICES FURNISHED UNDER        THIS PLAN OF TREATMENT AND WHILE UNDER MY CARE     (Physician co-signature of this document indicates review and certification of the therapy plan).                  Certification date from: 04/24/18 Certification date to: 07/17/18          Referring Physician: Dr. Natalie Moreira MD     Initial Assessment        See Epic Evaluation Start Of Care Date: 04/24/18     Paz Menchaca

## 2018-05-30 ENCOUNTER — HOSPITAL ENCOUNTER (OUTPATIENT)
Dept: OCCUPATIONAL THERAPY | Facility: CLINIC | Age: 73
Setting detail: THERAPIES SERIES
End: 2018-05-30
Attending: OPHTHALMOLOGY
Payer: MEDICARE

## 2018-05-30 PROCEDURE — 97537 COMMUNITY/WORK REINTEGRATION: CPT | Mod: GO | Performed by: REHABILITATION PRACTITIONER

## 2018-05-30 PROCEDURE — 97535 SELF CARE MNGMENT TRAINING: CPT | Mod: GO | Performed by: REHABILITATION PRACTITIONER

## 2018-05-30 PROCEDURE — 40000249 ZZH STATISTIC VISIT LOW VISION CLINIC: Performed by: REHABILITATION PRACTITIONER

## 2018-06-06 ENCOUNTER — HOSPITAL ENCOUNTER (OUTPATIENT)
Dept: OCCUPATIONAL THERAPY | Facility: CLINIC | Age: 73
Setting detail: THERAPIES SERIES
End: 2018-06-06
Attending: OPHTHALMOLOGY
Payer: MEDICARE

## 2018-06-06 PROCEDURE — 97535 SELF CARE MNGMENT TRAINING: CPT | Mod: GO | Performed by: REHABILITATION PRACTITIONER

## 2018-06-06 PROCEDURE — 40000249 ZZH STATISTIC VISIT LOW VISION CLINIC: Performed by: REHABILITATION PRACTITIONER

## 2018-06-13 ENCOUNTER — HOSPITAL ENCOUNTER (OUTPATIENT)
Dept: OCCUPATIONAL THERAPY | Facility: CLINIC | Age: 73
Setting detail: THERAPIES SERIES
End: 2018-06-13
Attending: OPHTHALMOLOGY
Payer: MEDICARE

## 2018-06-13 PROCEDURE — 40000249 ZZH STATISTIC VISIT LOW VISION CLINIC: Performed by: REHABILITATION PRACTITIONER

## 2018-06-13 PROCEDURE — 97535 SELF CARE MNGMENT TRAINING: CPT | Mod: GO | Performed by: REHABILITATION PRACTITIONER

## 2018-12-17 NOTE — PROGRESS NOTES
OCCUPATIONAL THERAPY VISUAL REHABILITATION DISCHARGE SUMMARY     Patient: Edita Tai  : 1945  Insurance:   Payor/Plan Subscriber Name Rel Member # Group #   MEDICARE - MEDICARE F* EDITA TAI [id number]       ATTN CLAIMS, [address]   MEDICA - MEDICA PRIME* EDITA TAI [id number]      [address] [address]       Beginning/End Dates of Reporting Period:  18 to 2018    Therapy Diagnosis: Impaired ADL/IADL I and safety as a result of cortical vision loss.        GOALS     Goal 1 - Near vision    Goal Description: Near Vision: Patient will verbalize awareness of visual field Loss and demonstrate improved use of visual skills/adaptive equipment for increased independence in reading-based activities of daily living, written communication and detail ADL tasks.       Target Date: 18        Goal 2 - Visual field    Goal Description: Visual field: Patient will verbalize awareness of visual field loss and demonstrate improved use of visual skills for increased safety/ independence in locating objects/obstacles and in navigation       Target Date: 18        Goal 3 - Environmental modification    Goal Description: Environment modification: Patient will demonstrate understanding of the impact of lighting, contrast and glare on ADL activities, in conjunction with environmentally-based ADL modifications       Target Date: 18        Goal 4 - Resource education    Goal Description: Resource education: Patient will verbalize awareness of community resources for the following:, Audio access to print materials, Access to large print materials, Access to low vision devices, Transportation alternatives, Support in vocational goals, Telephone services       Target Date: 18        Goal 5 - Distance viewing    Goal Description: Distance viewing: Patient will demonstrate proficient use of a distance device in conjunction with appropriate visual skills techniques to correctly  survey objects in the distance       Target Date: 07/17/18        Goal 6 - Cognition                     Goal 7    Goal Description: Pt will be able to cut/file nails with mod I and use of environmental modifications and AE.   Target Date: 07/17/18        Goal 8    Goal Description: Pt will identify 1-2 volunteer/vocational resources to participate in 1 day a week to improve participation in community activities.    Target Date: 07/17/18          Progress Toward Goals  Progress: Goal 3 partially met    Reason for Discharge  Patient has failed to schedule further appointments.    Adaptive Equipment/Optical Devices Recommended:  CCTV  Task light - Full spectrum  Gel pen/marker  Contrast in environment    Discharge Plan  Other services: [facility] services for visually impaired, books on tape

## 2019-09-09 ENCOUNTER — OFFICE VISIT (OUTPATIENT)
Dept: OPHTHALMOLOGY | Facility: CLINIC | Age: 74
End: 2019-09-09
Attending: OPHTHALMOLOGY
Payer: COMMERCIAL

## 2019-09-09 DIAGNOSIS — H53.10 SUBJECTIVE VISUAL DISTURBANCE: Primary | ICD-10-CM

## 2019-09-09 DIAGNOSIS — H47.013 NAION (NON-ARTERITIC ANTERIOR ISCHEMIC OPTIC NEUROPATHY), BILATERAL: ICD-10-CM

## 2019-09-09 PROCEDURE — G0463 HOSPITAL OUTPT CLINIC VISIT: HCPCS | Mod: ZF

## 2019-09-09 PROCEDURE — 92133 CPTRZD OPH DX IMG PST SGM ON: CPT | Mod: ZF | Performed by: OPHTHALMOLOGY

## 2019-09-09 PROCEDURE — 92083 EXTENDED VISUAL FIELD XM: CPT | Mod: ZF | Performed by: OPHTHALMOLOGY

## 2019-09-09 ASSESSMENT — VISUAL ACUITY
METHOD: SNELLEN - LINEAR
OS_SC: 2/200
OD_SC: 20/500

## 2019-09-09 ASSESSMENT — TONOMETRY
IOP_METHOD: TONOPEN
OD_IOP_MMHG: 11
OS_IOP_MMHG: 14

## 2019-09-09 ASSESSMENT — SLIT LAMP EXAM - LIDS
COMMENTS: NORMAL
COMMENTS: NORMAL

## 2019-09-09 ASSESSMENT — EXTERNAL EXAM - RIGHT EYE: OD_EXAM: NORMAL

## 2019-09-09 ASSESSMENT — CONF VISUAL FIELD
OS_NORMAL: 1
OD_NORMAL: 1
METHOD: COUNTING FINGERS

## 2019-09-09 ASSESSMENT — EXTERNAL EXAM - LEFT EYE: OS_EXAM: NORMAL

## 2019-09-09 NOTE — PROGRESS NOTES
Assessment & Plan     Emmanuel Clinton is a 72 year old male with the following diagnoses:   1. Subjective visual disturbance    2. NAION (non-arteritic anterior ischemic optic neuropathy), bilateral       Sequential NAION left eye followed by right eye on 2/12/18. He did receive steroid injection with Dr. Moreira 2/2018. He reports stable vision without improvement. Denies change in vision.     Optic disc edema has since resolved. Visual field right eye reliable slightly worse with inferior altitudinal defect right eye, central scotoma with inferior altitudinal defect left eye slightly worse but unreliable. There is a trace epiretinal membrane in the left eye. His vision is worse in the left eye without a maculopathy or apparent change in the left optic nerve.     Overall, his vision is mildly worse LEFT eye which can occur with nonarteritic anterior ischemic optic neuropathy.   I would recommend observation.  Patient wants second opinion at [facility].      Attending Physician Attestation:  Complete documentation of historical and exam elements from today's encounter can be found in the full encounter summary report (not reduplicated in this progress note).  I personally obtained the chief complaint(s) and history of present illness.  I confirmed and edited as necessary the review of systems, past medical/surgical history, family history, social history, and examination findings as documented by others; and I examined the patient myself.  I personally reviewed the relevant tests, images, and reports as documented above.  I formulated and edited as necessary the assessment and plan and discussed the findings and management plan with the patient and family. I personally reviewed the ophthalmic test(s) associated with this encounter, agree with the interpretation(s) as documented by the resident/fellow, and have edited the corresponding report(s) as necessary.  - Dayron Lorenzo,  MD  Ophthalmology, PGY-5  Neuro-Ophthalmology Fellow

## 2019-09-09 NOTE — NURSING NOTE
Chief Complaints and History of Present Illnesses   Patient presents with     Yearly Exam     Chief Complaint(s) and History of Present Illness(es)     Yearly Exam     Laterality: both eyes    Onset: gradual    Onset: years ago    Quality: States va is the same since last visit       Frequency: constantly    Associated symptoms: headache (comes and goes)    Pain scale: 0/10              Comments     non-arteritic anterior ischemic optic neuropathy  Debra Easley COT 12:37 PM September 9, 2019

## 2019-09-10 DIAGNOSIS — H47.013 NAION (NON-ARTERITIC ANTERIOR ISCHEMIC OPTIC NEUROPATHY), BILATERAL: Primary | ICD-10-CM

## 2019-09-24 ENCOUNTER — HOSPITAL ENCOUNTER (INPATIENT)
Facility: CLINIC | Age: 74
LOS: 1 days | Discharge: HOME | DRG: 065 | End: 2019-09-26
Attending: EMERGENCY MEDICINE | Admitting: INTERNAL MEDICINE
Payer: COMMERCIAL

## 2019-09-24 ENCOUNTER — APPOINTMENT (OUTPATIENT)
Dept: CT IMAGING | Facility: CLINIC | Age: 74
DRG: 065 | End: 2019-09-24
Attending: EMERGENCY MEDICINE
Payer: COMMERCIAL

## 2019-09-24 DIAGNOSIS — I63.9 ACUTE ISCHEMIC STROKE (H): ICD-10-CM

## 2019-09-24 LAB
ANION GAP SERPL CALCULATED.3IONS-SCNC: 5 MMOL/L (ref 3–14)
APTT PPP: 35 SEC (ref 22–37)
BUN SERPL-MCNC: 17 MG/DL (ref 7–30)
CALCIUM SERPL-MCNC: 8.7 MG/DL (ref 8.5–10.1)
CHLORIDE SERPL-SCNC: 104 MMOL/L (ref 94–109)
CO2 SERPL-SCNC: 26 MMOL/L (ref 20–32)
CREAT SERPL-MCNC: 1.34 MG/DL (ref 0.66–1.25)
GFR SERPL CREATININE-BSD FRML MDRD: 51 ML/MIN/{1.73_M2}
GLUCOSE SERPL-MCNC: 104 MG/DL (ref 70–99)
INR PPP: 0.99 (ref 0.86–1.14)
INTERPRETATION ECG - MUSE: NORMAL
POTASSIUM SERPL-SCNC: 4.1 MMOL/L (ref 3.4–5.3)
SODIUM SERPL-SCNC: 135 MMOL/L (ref 133–144)
TROPONIN I SERPL-MCNC: <0.015 UG/L (ref 0–0.04)

## 2019-09-24 PROCEDURE — 85610 PROTHROMBIN TIME: CPT | Performed by: EMERGENCY MEDICINE

## 2019-09-24 PROCEDURE — 25000128 H RX IP 250 OP 636: Performed by: EMERGENCY MEDICINE

## 2019-09-24 PROCEDURE — 80048 BASIC METABOLIC PNL TOTAL CA: CPT | Performed by: EMERGENCY MEDICINE

## 2019-09-24 PROCEDURE — 93005 ELECTROCARDIOGRAM TRACING: CPT

## 2019-09-24 PROCEDURE — 99285 EMERGENCY DEPT VISIT HI MDM: CPT | Mod: 25

## 2019-09-24 PROCEDURE — 96360 HYDRATION IV INFUSION INIT: CPT

## 2019-09-24 PROCEDURE — 70498 CT ANGIOGRAPHY NECK: CPT

## 2019-09-24 PROCEDURE — 85025 COMPLETE CBC W/AUTO DIFF WBC: CPT | Performed by: EMERGENCY MEDICINE

## 2019-09-24 PROCEDURE — 84484 ASSAY OF TROPONIN QUANT: CPT | Performed by: EMERGENCY MEDICINE

## 2019-09-24 PROCEDURE — 85730 THROMBOPLASTIN TIME PARTIAL: CPT | Performed by: EMERGENCY MEDICINE

## 2019-09-24 PROCEDURE — 70450 CT HEAD/BRAIN W/O DYE: CPT

## 2019-09-24 RX ORDER — IOPAMIDOL 755 MG/ML
120 INJECTION, SOLUTION INTRAVASCULAR ONCE
Status: COMPLETED | OUTPATIENT
Start: 2019-09-24 | End: 2019-09-24

## 2019-09-24 RX ADMIN — SODIUM CHLORIDE 500 ML: 9 INJECTION, SOLUTION INTRAVENOUS at 23:40

## 2019-09-24 RX ADMIN — IOPAMIDOL 120 ML: 755 INJECTION, SOLUTION INTRAVENOUS at 23:28

## 2019-09-24 ASSESSMENT — ENCOUNTER SYMPTOMS
NUMBNESS: 1
PALPITATIONS: 0
HEADACHES: 1
SPEECH DIFFICULTY: 1

## 2019-09-24 ASSESSMENT — MIFFLIN-ST. JEOR: SCORE: 1565

## 2019-09-25 ENCOUNTER — APPOINTMENT (OUTPATIENT)
Dept: OCCUPATIONAL THERAPY | Facility: CLINIC | Age: 74
DRG: 065 | End: 2019-09-25
Attending: INTERNAL MEDICINE
Payer: COMMERCIAL

## 2019-09-25 ENCOUNTER — APPOINTMENT (OUTPATIENT)
Dept: CT IMAGING | Facility: CLINIC | Age: 74
DRG: 065 | End: 2019-09-25
Attending: EMERGENCY MEDICINE
Payer: COMMERCIAL

## 2019-09-25 ENCOUNTER — APPOINTMENT (OUTPATIENT)
Dept: PHYSICAL THERAPY | Facility: CLINIC | Age: 74
DRG: 065 | End: 2019-09-25
Attending: INTERNAL MEDICINE
Payer: COMMERCIAL

## 2019-09-25 ENCOUNTER — APPOINTMENT (OUTPATIENT)
Dept: CARDIOLOGY | Facility: CLINIC | Age: 74
DRG: 065 | End: 2019-09-25
Attending: INTERNAL MEDICINE
Payer: COMMERCIAL

## 2019-09-25 ENCOUNTER — APPOINTMENT (OUTPATIENT)
Dept: SPEECH THERAPY | Facility: CLINIC | Age: 74
DRG: 065 | End: 2019-09-25
Attending: INTERNAL MEDICINE
Payer: COMMERCIAL

## 2019-09-25 ENCOUNTER — APPOINTMENT (OUTPATIENT)
Dept: MRI IMAGING | Facility: CLINIC | Age: 74
DRG: 065 | End: 2019-09-25
Attending: INTERNAL MEDICINE
Payer: COMMERCIAL

## 2019-09-25 PROBLEM — I63.9 CEREBROVASCULAR ACCIDENT (CVA) (H): Status: ACTIVE | Noted: 2019-09-25

## 2019-09-25 LAB
ALBUMIN SERPL-MCNC: 3.6 G/DL (ref 3.4–5)
ALP SERPL-CCNC: 38 U/L (ref 40–150)
ALT SERPL W P-5'-P-CCNC: 17 U/L (ref 0–70)
ANION GAP SERPL CALCULATED.3IONS-SCNC: 2 MMOL/L (ref 3–14)
AST SERPL W P-5'-P-CCNC: 14 U/L (ref 0–45)
BASOPHILS # BLD AUTO: 0 10E9/L (ref 0–0.2)
BASOPHILS NFR BLD AUTO: 0.2 %
BILIRUB DIRECT SERPL-MCNC: 0.1 MG/DL (ref 0–0.2)
BILIRUB SERPL-MCNC: 0.4 MG/DL (ref 0.2–1.3)
BUN SERPL-MCNC: 15 MG/DL (ref 7–30)
CALCIUM SERPL-MCNC: 8.4 MG/DL (ref 8.5–10.1)
CHLORIDE SERPL-SCNC: 103 MMOL/L (ref 94–109)
CHOLEST SERPL-MCNC: 121 MG/DL
CO2 SERPL-SCNC: 28 MMOL/L (ref 20–32)
CREAT SERPL-MCNC: 1.35 MG/DL (ref 0.66–1.25)
DIFFERENTIAL METHOD BLD: ABNORMAL
EOSINOPHIL # BLD AUTO: 0.1 10E9/L (ref 0–0.7)
EOSINOPHIL NFR BLD AUTO: 1.6 %
ERYTHROCYTE [DISTWIDTH] IN BLOOD BY AUTOMATED COUNT: 12.6 % (ref 10–15)
ERYTHROCYTE [DISTWIDTH] IN BLOOD BY AUTOMATED COUNT: 12.8 % (ref 10–15)
GFR SERPL CREATININE-BSD FRML MDRD: 51 ML/MIN/{1.73_M2}
GLUCOSE BLDC GLUCOMTR-MCNC: 162 MG/DL (ref 70–99)
GLUCOSE BLDC GLUCOMTR-MCNC: 97 MG/DL (ref 70–99)
GLUCOSE SERPL-MCNC: 99 MG/DL (ref 70–99)
HBA1C MFR BLD: 5.5 % (ref 0–5.6)
HCT VFR BLD AUTO: 40.9 % (ref 40–53)
HCT VFR BLD AUTO: 41 % (ref 40–53)
HDLC SERPL-MCNC: 26 MG/DL
HGB BLD-MCNC: 14.1 G/DL (ref 13.3–17.7)
HGB BLD-MCNC: 14.3 G/DL (ref 13.3–17.7)
IMM GRANULOCYTES # BLD: 0 10E9/L (ref 0–0.4)
IMM GRANULOCYTES NFR BLD: 0.4 %
LDLC SERPL CALC-MCNC: 72 MG/DL
LYMPHOCYTES # BLD AUTO: 3.3 10E9/L (ref 0.8–5.3)
LYMPHOCYTES NFR BLD AUTO: 40.8 %
MCH RBC QN AUTO: 32.9 PG (ref 26.5–33)
MCH RBC QN AUTO: 32.9 PG (ref 26.5–33)
MCHC RBC AUTO-ENTMCNC: 34.5 G/DL (ref 31.5–36.5)
MCHC RBC AUTO-ENTMCNC: 34.9 G/DL (ref 31.5–36.5)
MCV RBC AUTO: 95 FL (ref 78–100)
MCV RBC AUTO: 96 FL (ref 78–100)
MONOCYTES # BLD AUTO: 1.6 10E9/L (ref 0–1.3)
MONOCYTES NFR BLD AUTO: 20.5 %
NEUTROPHILS # BLD AUTO: 2.9 10E9/L (ref 1.6–8.3)
NEUTROPHILS NFR BLD AUTO: 36.5 %
NONHDLC SERPL-MCNC: 95 MG/DL
NRBC # BLD AUTO: 0 10*3/UL
NRBC BLD AUTO-RTO: 0 /100
PLATELET # BLD AUTO: 258 10E9/L (ref 150–450)
PLATELET # BLD AUTO: 270 10E9/L (ref 150–450)
PLATELET # BLD EST: ABNORMAL 10*3/UL
POTASSIUM SERPL-SCNC: 4.5 MMOL/L (ref 3.4–5.3)
PROT SERPL-MCNC: 6.4 G/DL (ref 6.8–8.8)
RBC # BLD AUTO: 4.28 10E12/L (ref 4.4–5.9)
RBC # BLD AUTO: 4.34 10E12/L (ref 4.4–5.9)
RBC MORPH BLD: NORMAL
SODIUM SERPL-SCNC: 133 MMOL/L (ref 133–144)
TRIGL SERPL-MCNC: 117 MG/DL
TROPONIN I SERPL-MCNC: <0.015 UG/L (ref 0–0.04)
TROPONIN I SERPL-MCNC: <0.015 UG/L (ref 0–0.04)
TSH SERPL DL<=0.005 MIU/L-ACNC: 1.54 MU/L (ref 0.4–4)
WBC # BLD AUTO: 7.5 10E9/L (ref 4–11)
WBC # BLD AUTO: 8 10E9/L (ref 4–11)

## 2019-09-25 PROCEDURE — 25800030 ZZH RX IP 258 OP 636: Performed by: INTERNAL MEDICINE

## 2019-09-25 PROCEDURE — 85027 COMPLETE CBC AUTOMATED: CPT | Performed by: INTERNAL MEDICINE

## 2019-09-25 PROCEDURE — 92526 ORAL FUNCTION THERAPY: CPT | Mod: GN | Performed by: SPEECH-LANGUAGE PATHOLOGIST

## 2019-09-25 PROCEDURE — 97116 GAIT TRAINING THERAPY: CPT | Mod: GP

## 2019-09-25 PROCEDURE — 12000000 ZZH R&B MED SURG/OB

## 2019-09-25 PROCEDURE — 40000264 ECHOCARDIOGRAM COMPLETE

## 2019-09-25 PROCEDURE — 80061 LIPID PANEL: CPT | Performed by: INTERNAL MEDICINE

## 2019-09-25 PROCEDURE — 25000132 ZZH RX MED GY IP 250 OP 250 PS 637: Performed by: INTERNAL MEDICINE

## 2019-09-25 PROCEDURE — A9585 GADOBUTROL INJECTION: HCPCS | Performed by: INTERNAL MEDICINE

## 2019-09-25 PROCEDURE — 0042T CT HEAD PERFUSION WITH CONTRAST: CPT

## 2019-09-25 PROCEDURE — 36415 COLL VENOUS BLD VENIPUNCTURE: CPT | Performed by: INTERNAL MEDICINE

## 2019-09-25 PROCEDURE — 93306 TTE W/DOPPLER COMPLETE: CPT | Mod: 26 | Performed by: INTERNAL MEDICINE

## 2019-09-25 PROCEDURE — 99223 1ST HOSP IP/OBS HIGH 75: CPT | Mod: GC | Performed by: PSYCHIATRY & NEUROLOGY

## 2019-09-25 PROCEDURE — 80076 HEPATIC FUNCTION PANEL: CPT | Performed by: INTERNAL MEDICINE

## 2019-09-25 PROCEDURE — 84443 ASSAY THYROID STIM HORMONE: CPT | Performed by: INTERNAL MEDICINE

## 2019-09-25 PROCEDURE — 84484 ASSAY OF TROPONIN QUANT: CPT | Performed by: INTERNAL MEDICINE

## 2019-09-25 PROCEDURE — 25000128 H RX IP 250 OP 636: Performed by: INTERNAL MEDICINE

## 2019-09-25 PROCEDURE — 80048 BASIC METABOLIC PNL TOTAL CA: CPT | Performed by: INTERNAL MEDICINE

## 2019-09-25 PROCEDURE — 00000146 ZZHCL STATISTIC GLUCOSE BY METER IP

## 2019-09-25 PROCEDURE — 25500064 ZZH RX 255 OP 636: Performed by: INTERNAL MEDICINE

## 2019-09-25 PROCEDURE — 97530 THERAPEUTIC ACTIVITIES: CPT | Mod: GO

## 2019-09-25 PROCEDURE — 99406 BEHAV CHNG SMOKING 3-10 MIN: CPT

## 2019-09-25 PROCEDURE — 99223 1ST HOSP IP/OBS HIGH 75: CPT | Mod: AI | Performed by: INTERNAL MEDICINE

## 2019-09-25 PROCEDURE — 83036 HEMOGLOBIN GLYCOSYLATED A1C: CPT | Performed by: INTERNAL MEDICINE

## 2019-09-25 PROCEDURE — 97161 PT EVAL LOW COMPLEX 20 MIN: CPT | Mod: GP

## 2019-09-25 PROCEDURE — 99207 ZZC CDG-MDM COMPONENT: MEETS MODERATE - UP CODED: CPT | Performed by: INTERNAL MEDICINE

## 2019-09-25 PROCEDURE — 70553 MRI BRAIN STEM W/O & W/DYE: CPT

## 2019-09-25 PROCEDURE — 97165 OT EVAL LOW COMPLEX 30 MIN: CPT | Mod: GO

## 2019-09-25 PROCEDURE — 92610 EVALUATE SWALLOWING FUNCTION: CPT | Mod: GN | Performed by: SPEECH-LANGUAGE PATHOLOGIST

## 2019-09-25 RX ORDER — ASPIRIN 81 MG/1
81 TABLET ORAL DAILY
Status: DISCONTINUED | OUTPATIENT
Start: 2019-09-26 | End: 2019-09-26 | Stop reason: HOSPADM

## 2019-09-25 RX ORDER — TEMAZEPAM 15 MG/1
15 CAPSULE ORAL
COMMUNITY

## 2019-09-25 RX ORDER — LORAZEPAM 2 MG/ML
1 INJECTION INTRAMUSCULAR ONCE
Status: COMPLETED | OUTPATIENT
Start: 2019-09-25 | End: 2019-09-25

## 2019-09-25 RX ORDER — ALBUTEROL SULFATE 90 UG/1
2 AEROSOL, METERED RESPIRATORY (INHALATION) EVERY 6 HOURS PRN
Status: DISCONTINUED | OUTPATIENT
Start: 2019-09-25 | End: 2019-09-26 | Stop reason: HOSPADM

## 2019-09-25 RX ORDER — ATORVASTATIN CALCIUM 40 MG/1
40 TABLET, FILM COATED ORAL EVERY EVENING
Status: DISCONTINUED | OUTPATIENT
Start: 2019-09-25 | End: 2019-09-26 | Stop reason: HOSPADM

## 2019-09-25 RX ORDER — PRAVASTATIN SODIUM 20 MG
80 TABLET ORAL EVERY EVENING
Status: DISCONTINUED | OUTPATIENT
Start: 2019-09-25 | End: 2019-09-25

## 2019-09-25 RX ORDER — LORATADINE 10 MG/1
10 TABLET ORAL DAILY PRN
COMMUNITY

## 2019-09-25 RX ORDER — ASPIRIN 300 MG/1
300 SUPPOSITORY RECTAL DAILY
Status: DISCONTINUED | OUTPATIENT
Start: 2019-09-25 | End: 2019-09-25

## 2019-09-25 RX ORDER — HYDROCHLOROTHIAZIDE 12.5 MG/1
12.5 CAPSULE ORAL DAILY
Status: DISCONTINUED | OUTPATIENT
Start: 2019-09-25 | End: 2019-09-25

## 2019-09-25 RX ORDER — LABETALOL HYDROCHLORIDE 5 MG/ML
10-40 INJECTION, SOLUTION INTRAVENOUS EVERY 10 MIN PRN
Status: DISCONTINUED | OUTPATIENT
Start: 2019-09-25 | End: 2019-09-25

## 2019-09-25 RX ORDER — NALOXONE HYDROCHLORIDE 0.4 MG/ML
.1-.4 INJECTION, SOLUTION INTRAMUSCULAR; INTRAVENOUS; SUBCUTANEOUS
Status: DISCONTINUED | OUTPATIENT
Start: 2019-09-25 | End: 2019-09-26 | Stop reason: HOSPADM

## 2019-09-25 RX ORDER — METOPROLOL SUCCINATE 50 MG/1
50 TABLET, EXTENDED RELEASE ORAL DAILY
Status: DISCONTINUED | OUTPATIENT
Start: 2019-09-25 | End: 2019-09-26 | Stop reason: HOSPADM

## 2019-09-25 RX ORDER — CHOLECALCIFEROL (VITAMIN D3) 50 MCG
2000 TABLET ORAL DAILY
COMMUNITY

## 2019-09-25 RX ORDER — CLOPIDOGREL BISULFATE 75 MG/1
75 TABLET ORAL DAILY
Status: DISCONTINUED | OUTPATIENT
Start: 2019-09-25 | End: 2019-09-26 | Stop reason: HOSPADM

## 2019-09-25 RX ORDER — LABETALOL HYDROCHLORIDE 5 MG/ML
10 INJECTION, SOLUTION INTRAVENOUS
Status: DISCONTINUED | OUTPATIENT
Start: 2019-09-25 | End: 2019-09-26 | Stop reason: HOSPADM

## 2019-09-25 RX ORDER — LIDOCAINE 40 MG/G
CREAM TOPICAL
Status: DISCONTINUED | OUTPATIENT
Start: 2019-09-25 | End: 2019-09-26 | Stop reason: HOSPADM

## 2019-09-25 RX ORDER — SODIUM CHLORIDE 9 MG/ML
INJECTION, SOLUTION INTRAVENOUS CONTINUOUS
Status: DISCONTINUED | OUTPATIENT
Start: 2019-09-25 | End: 2019-09-26 | Stop reason: HOSPADM

## 2019-09-25 RX ORDER — HYDROCHLOROTHIAZIDE 12.5 MG/1
12.5 CAPSULE ORAL
Status: DISCONTINUED | OUTPATIENT
Start: 2019-09-25 | End: 2019-09-25

## 2019-09-25 RX ORDER — GADOBUTROL 604.72 MG/ML
8 INJECTION INTRAVENOUS ONCE
Status: COMPLETED | OUTPATIENT
Start: 2019-09-25 | End: 2019-09-25

## 2019-09-25 RX ADMIN — CLOPIDOGREL BISULFATE 75 MG: 75 TABLET ORAL at 10:28

## 2019-09-25 RX ADMIN — SODIUM CHLORIDE: 9 INJECTION, SOLUTION INTRAVENOUS at 03:49

## 2019-09-25 RX ADMIN — ASPIRIN 325 MG: 325 TABLET, DELAYED RELEASE ORAL at 10:28

## 2019-09-25 RX ADMIN — UMECLIDINIUM BROMIDE AND VILANTEROL TRIFENATATE 1 PUFF: 62.5; 25 POWDER RESPIRATORY (INHALATION) at 10:28

## 2019-09-25 RX ADMIN — ASPIRIN 300 MG: 300 SUPPOSITORY RECTAL at 03:44

## 2019-09-25 RX ADMIN — SODIUM CHLORIDE: 9 INJECTION, SOLUTION INTRAVENOUS at 16:40

## 2019-09-25 RX ADMIN — LORAZEPAM 1 MG: 2 INJECTION INTRAMUSCULAR; INTRAVENOUS at 08:22

## 2019-09-25 RX ADMIN — ATORVASTATIN CALCIUM 40 MG: 40 TABLET, FILM COATED ORAL at 19:55

## 2019-09-25 RX ADMIN — GADOBUTROL 8 ML: 604.72 INJECTION INTRAVENOUS at 08:48

## 2019-09-25 ASSESSMENT — ACTIVITIES OF DAILY LIVING (ADL)
ADLS_ACUITY_SCORE: 10

## 2019-09-25 ASSESSMENT — MIFFLIN-ST. JEOR: SCORE: 1563.3

## 2019-09-25 NOTE — PLAN OF CARE
Discharge Planner PT     PT: Orders received, eval completed, tx initiated.  Pt lives in a single-level home with his spouse. At baseline ambulates with a SEC. 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    Patient plan for discharge: Home  Current status: /81, spouse concerned, but discussed elevated BP okay at this time. RLE 5/5, LLE 4+/5 throughout. Complaint of L knee pain/grinding, but present PTA. IND bed mobility. Significant balance impairment without UE support. Ambulates 600' min-A with SEC. Erratic cane placement and frequent LOB to L recovered with min-A. Quick to  on SEC sequencing with education, but tendency to start walking too fast and loose balance again. Ambulates 600' SBA with FWW. Greatly improved balance and good mechanics other than decreased L foot clearance that improves with cues. Discussed PT plan for post discharge. Pt and spouse agreeable to walker use at all time right now and supervision with mobility. Pt screened for OT needs, does present with decreased L fine motor and reporting difficulty eating.  Barriers to return to prior living situation: Unsteady without UE support and can be impulsive  Recommendations for discharge: Home with OP PT  Rationale for recommendations: Anticipate with further IP PT, pt will progress well to safely discharge home with walker sure and supervision with mobility. OP PT to continue to progress balance and gait and address L knee pain complaint.       Entered by: Zenon Heller 09/25/2019 3:25 PM

## 2019-09-25 NOTE — PHARMACY-ADMISSION MEDICATION HISTORY
Admission medication history interview status for the 9/24/2019  admission is complete. See EPIC admission navigator for prior to admission medications     Medication history source reliability:Good    Actions taken by pharmacist (provider contacted, etc):None     Additional medication history information not noted on PTA med list : No longer taking Citalopram or HCTZ    Medication reconciliation/reorder completed by provider prior to medication history? Yes    Time spent in this activity: 15 min    Prior to Admission medications    Medication Sig Last Dose Taking? Auth Provider   acetaminophen (TYLENOL) 500 MG tablet Take 1,000 mg by mouth every 6 hours as needed  9/24/2019 at Unknown time Yes Reported, Patient   albuterol (PROAIR HFA/PROVENTIL HFA/VENTOLIN HFA) 108 (90 BASE) MCG/ACT Inhaler Inhale 2 puffs into the lungs every 6 hours as needed for shortness of breath / dyspnea or wheezing Past Month at Unknown time Yes Lorin Valenzuela MD   BEVESPI AEROSPHERE 9-4.8 MCG/ACT oral inhaler Inhale 2 puffs into the lungs 2 times daily  9/24/2019 at Unknown time Yes Reported, Patient   clopidogrel (PLAVIX) 75 MG tablet Take 75 mg by mouth daily  9/24/2019 at am Yes Reported, Patient   loratadine (CLARITIN) 10 MG tablet Take 10 mg by mouth daily as needed for allergies Past Month at Unknown time Yes Unknown, Entered By History   metoprolol (TOPROL-XL) 100 MG 24 hr tablet Take 100 mg by mouth daily  9/24/2019 at am Yes Reported, Patient   Multiple Vitamins-Minerals (CENTRUM SILVER) per tablet Take 1 tablet by mouth daily 9/24/2019 at am Yes Unknown, Entered By History   temazepam (RESTORIL) 15 MG capsule Take 15 mg by mouth nightly as needed for sleep  Yes Unknown, Entered By History   triamcinolone (KENALOG) 0.1 % cream Apply topically daily  9/24/2019 at Unknown time Yes Reported, Patient   vitamin D3 (CHOLECALCIFEROL) 2000 units (50 mcg) tablet Take 2,000 Units by mouth daily 9/24/2019 at am Yes Unknown, Entered By  History   pravastatin (PRAVACHOL) 80 MG tablet Take 80 mg by mouth every evening  9/23/2019  Reported, Patient   traZODone (DESYREL) 50 MG tablet Take 50 mg by mouth At Bedtime  9/23/2019  Reported, Patient

## 2019-09-25 NOTE — ED PROVIDER NOTES
History     Chief Complaint:  Numbness    HPI   Emmanuel Clinton is a 73 year old male with a history of COPD, hypertension, hyperlipidemia, psoriasis, pulmonary nodule, and TIA who is anticoagulated on Plavix who presents with numbness. Per EMS report, the patient sustained a fall on September 5 hitting the right side of his head and was evaluated for this today of which they plan to have a head CT taken tomorrow. Tonight, around 1900 (4.5 hours ago), he had a sudden onset of tingling in his fingers followed by left sided numbness in his fingers. He had a headache earlier today but took Tylenol and this had subsided. He notes that his symptoms of progressively worsened with slurred speech. The patient states that his headache was on the right side of his head compared to his numbness on the left. For EMS he had difficulty walking to the ambulance with placing his left foot over his right. Otherwise, he was noted to be vitally stable. The patient denies chest pain or palpitations.      Allergies:  No known drug allergies.       Medications:    Albuterol  Bevespi Aerosphere  Zyrtec  Peridex  Vitamin D3  Celexa  Plavix  Valium  Hydrochlorothiazide  Lycopene   Toprol XL  Centrum Silver  Pravachol   Nicoderm  Desyrel  Kenalog   Nasacort AQ    Past Medical History:    Bronchitis   COPD  Depression  stroke   Nonallopathic lesion of thoracic region, sacral region, and lumbar region   Hyperlipidemia   Hypertension   Low back pain   Pelvic fracture    Psoriasis   Pulmonary nodule   TIA      Past Surgical History:    Past surgical history reviewed. No pertinent past surgical history.     Family History:    Brother: diabetes    Social History:  The patient was accompanied to the ED by EMS.  Smoking Status: Current Every Day Smoker  Smokeless Tobacco: Never Used  Alcohol Use: Negative   Drug Use: Negative  PCP: Nicolette Carlson   Marital Status:        Review of Systems   Cardiovascular: Negative for chest pain and  "palpitations.   Musculoskeletal: Positive for gait problem.   Neurological: Positive for speech difficulty, numbness and headaches.   All other systems reviewed and are negative.    Physical Exam     Patient Vitals for the past 24 hrs:   BP Temp Temp src Pulse Heart Rate Resp SpO2 Height Weight   09/25/19 0400 (!) 152/78 98.4  F (36.9  C) Oral 66 -- 18 95 % -- --   09/25/19 0153 (!) 157/77 98.2  F (36.8  C) Oral 65 -- 20 96 % 1.829 m (6' 0.01\") 79 kg (174 lb 3.2 oz)   09/25/19 0040 (!) 156/79 -- -- -- 63 16 95 % -- --   09/25/19 0030 (!) 148/65 -- -- 64 68 (!) 76 93 % -- --   09/25/19 0024 -- -- -- -- 64 -- 94 % -- --   09/25/19 0023 -- -- -- -- 65 -- 90 % -- --   09/25/19 0020 (!) 157/75 -- -- 68 -- -- -- -- --   09/24/19 2330 (!) 147/80 -- -- 64 64 (!) 100 95 % -- --   09/24/19 2325 (!) 146/89 98.4  F (36.9  C) Temporal -- 65 16 92 % 1.829 m (6') 79.2 kg (174 lb 9.7 oz)      Physical Exam    Gen: Pleasant, appears stated age.    Eye:   Pupils are equal, round, and reactive.     Sclera non-injected.    ENT:   Moist mucus membranes.     Normal tongue.    Oropharynx without lesions.    Cardiac:     Bradycardic but regular rhythm.    No murmurs, gallops, or rubs.    Pulmonary:     Clear to auscultation bilaterally.    No wheezes, rales, or rhonchi.    Abdomen:     Normal active bowel sounds.     Abdomen is soft and non-distended, without focal tenderness.    Musculoskeletal:     Normal movement of all extremities without evidence for deficit.    Extremities:    No edema.    Skin:   Warm and dry.    Neurologic:    Pronator drift on the left side.    Slight facial droop on the left.    EOM intact.   Pass pointing finger to nose on the left.    FTN intact on the left.   5/5 strength RUE and RLE.    Psychiatric:     Normal affect with appropriate interaction with examiner.    Emergency Department Course     ECG:  ECG taken at 2337, ECG read at 2341  Normal sinus rhythm  Normal ECG  No significant change compared to EKG " dated 12/25/17  Rate 64 bpm. W[place] interval 144 ms. QRS duration 90 ms. QT/QTc 426/439 ms. P-R-T axes 64 46 20.    Imaging:  Radiology findings were communicated with the patient who voiced understanding of the findings.    CT Head Perfusion w Contrast  CT PERFUSION:  1.  No significant mismatch ratio or volume seen on the perfusion imaging.    CT Head w/o Contrast  HEAD CT:  1.  No discrete mass lesion, hemorrhage or large acute territorial infarct by CT.   2.  Possible vague edema involving medial right occipital lobes.   3.  Diffuse age related changes.  HEAD CTA:   1.  Vessel cutoff consistent with occlusion involving midportion of P1 segment of right posterior cerebral artery with no contrast seen distally.   2.  No other discrete vessel occlusion, aneurysm or high flow vascular malformation involving arteries of the [place].  NECK CTA:  1.  No significant stenosis in the neck vessels based on NASCET criteria.  2.  No evidence for dissection.  Head CT findings were communicated to Dr. Colon at 12:05 AM. CTA images were received at 12:35 AM. CTA images findings were reported to Dr. Colon by phone at 12:49 AM on 09/25/2019.  [Critical Result: Right posterior cerebral artery occlusion in midportion of P1 segment.]  Finding was identified on 9/25/2019 12:35 AM.   Dr. Colon was contacted by Dr. Hdz on 9/25/2019 12:55 AM and verbalized understanding of the critical result.     CTA Head Neck with Contrast    (Results Pending)  MRI Brain w & w/o contrast    (Results Pending)     Laboratory:  Laboratory findings were communicated with the patient who voiced understanding of the findings.    CBC: WBC 8.0, HGB 14.3,   BMP: Glucose 104 (H), GFR 51 (L), o/w WNL (Creatinine 1.34 (H))    INR: 0.99  Partial Thromboplastin Time: 35  Troponin (Collected 2327): <0.015     Interventions:  2340  mL IV     Emergency Department Course:    2322 Nursing notes and vitals reviewed. I performed an exam of the  patient as documented above.     2325 Code stroke initiated.     2327 IV was inserted and blood was drawn for laboratory testing, results above.     2332 I spoke with Dr. Gonzalez of the stroke neurology service regarding patient's presentation, findings, and plan of care.      2337 EKG obtained as noted above.     2355 I spoke with Dr. Hdz of the radiology service regarding patient's presentation, findings, and plan of care.     0006 I spoke with Dr. Gonzalez of the stroke neurology service regarding patient's presentation, findings, and plan of care.     0021 The patient was sent for a CT while in the emergency department, results above.      0030 The patient was sent for a CT while in the emergency department, results above.      0051 I spoke with Dr. Hdz of the radiology service regarding patient's presentation, findings, and plan of care.     0053 Patient rechecked and updated.      0057 I spoke with Dr. Gonzalez of the stroke neurology service regarding patient's presentation, findings, and plan of care.     0059 I spoke with Dr. Darby of the hospitalist service from [facility] regarding patient's presentation, findings, and plan of care.      Prior to admission, I personally reviewed the results with the patient and all related questions were answered. The patient verbalized understanding and is amenable to plan.     Impression & Plan      Medical Decision Making:  Emmanuel Clinton is a 73 year old male with a history of hypertension, hyperlipidemia, on Plavix who presents to the emergency department today for evaluation of acute onset of left sided facial numbness, difficulty swallowing, and ataxia. On exam, the patient has pronator drift, some left sided facial drooping, past pointing on the left, and difficulty swallowing. 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 This is likely related to occulusion of the P1 segment right posterior cerebral artery. Otherwise, laboratory  evaluation is unremarkable. Symptoms have persisted in the ED and the patient noted that the numbness has become slightly more dense. He is not a candidate for TPA given that he presented outside of the window. Dr. Gonzalez does not recommend IR intervention either. He will be brought into the hospital for additional work up including MRI and stroke neuro evaluation.     Diagnosis:    ICD-10-CM    1. Acute ischemic stroke (H) I63.9 Troponin I     Basic metabolic panel     CBC with platelets     Hemoglobin A1c     Hepatic panel     Lipid panel reflex to direct LDL     TSH with free T4 reflex     CMS Diagnoses: The patient has stroke symptoms:           ED Stroke specific documentation           NIHSS PDF          Protocol PDF     Patient last known well time: 1900  ED Provider first to bedside at: 2322    tPA:   Not given due to outside the time window.    If treating with tPA: Ensure SBP<185 and DBP<105 prior to treatment with IV tPA.  Administering IV tPA after treatment with IV labetalol, hydralazine, or nicardipine is reasonable once BP control is established.    Endovascular Retrieval:  per recommendation of neurologist    National [employer] Stroke Scale (Baseline)  Time Performed: 2322      Score    Level of consciousness: (0)   Alert, keenly responsive    LOC questions: (0)   Answers both questions correctly    LOC commands: (0)   Performs both tasks correctly    Best gaze: (0)   Normal    Visual: (0)   No visual loss    Facial palsy: (1)   Minor paralysis (flat nasolabial fold, smile asymmetry)    Motor arm (left): (1)   Drift    Motor arm (right): (0)   No drift    Motor leg (left): (1)   Drift    Motor leg (right): (0)   No drift    Limb ataxia: (1)   Present in one limb    Sensory: (1)   Mild to moderate sensory loss    Best language: (0)   Normal- no aphasia    Dysarthria: (0)   Normal    Extinction and inattention: (0)   No abnormality        Total Score:  5      Disposition:   The patient is  admitted into the care of Dr. Darby.     Scribe Disclosure:  I, Orla Severson, am serving as a scribe at 11:25 PM on 9/24/2019 to document services personally performed by No att. providers found based on my observations and the provider's statements to me.     EMERGENCY DEPARTMENT       Rosa Colon MD  09/25/19 0511

## 2019-09-25 NOTE — ED TRIAGE NOTES
1900 began having left sided facial numbness with left sided weakness. Had headache earlier that resolved with tylenol. On arrival + left arm drift with tingling to bilateral legs and left facial numbness. Slight left facial droop. Difficulty with left finger pointing.

## 2019-09-25 NOTE — PLAN OF CARE
Discharge Planner OT   Patient plan for discharge: return home     Current status: order received, chart reviewed, eval completed, tx initiated. Pt presents with L sided numbness/tingling found to have acute infarcts in the right thalamus and right hippocampus as well as infarcts in the right occipital lobe that were felt to be more subacute. Pt lives in house with spouse and reports IND with all ADL's. Spouse A with IADL's. Pt legally blind at baseline and uses cane for mobility.     Pt demos LUE incoordination and LUE weakness and dec sensation. OT provided and instructed on blue sponge exercises to complete as pt having difficulty manipulating small objects. Pt completed 10 reps of each exercise. OT provided red theraputty for L hand strengthening. Pt demos toilet transfer with SBA and verbal cues for safety. Provided pt with foam girp for utensils as he reports difficulty coordinating reciprocal movements with BUE's. Pt reports IND with dressing and toileting tasks this date, declines to practice.     Barriers to return to prior living situation: impaired balance, mildly impulsive     Recommendations for discharge: home with spouse A for cooking, cleaning, laundry, with use of FWW due to unsteadiness. OP OT for LUE coordination and strengthening.     Rationale for recommendations: pt will benefit from OP OT for LUE coordination and strengthening exercises as limiting for ADL function. Spouse will be home to A as needed at discharge. Pt much steadier on feet with use of FWW versus cane which he was using, agreeable to use at discharge. Will continue to see for IP OT for continued LUE strengthening/coordination and ADL tasks.        Entered by: Zeinab Orona 09/25/2019 3:11 PM

## 2019-09-25 NOTE — PLAN OF CARE
Discharge Planner SLP   Patient plan for discharge: Not addressed.   Current status: Bedside swallow evaluation completed. Patient presents with mild to moderate oral and pharyngeal dysphagia at bedside secondary to a right CVA. Deficits include; minimal to mild dysarthria, decreased ROM/strength for pursing, elevation and lateralization. He demonstrated premature entry of thin liquids without overt Sx of aspiration on consecutive swallows. Mildly decreased lingual function for bolus control during AP propulsion of the bolus. Mild to moderate oral residue on the hard/soft palate and left lateral sulci. No overt Sx of aspiration noted during the evaluation, but has a baseline cough.     Recommend: 1. DDL 3 with thin liquids. 2. Upright, no straws, small bites/sips, check for oral residue, double swallow or liquid rinse. Hold diet if Sx of aspiration present or changes in his respiratory status. 3. SLP will follow up for diet tolerance and advancement.     Barriers to return to prior living situation: Acute stroke deficits.   Recommendations for discharge: Pending further work up.   Rationale for recommendations: Patient may need short term ST needs at discharge for dysphagia management and complete a full speech/language evaluation if not completed as an IP. Patient is below his baseline and has excellent participation.        Entered by: Erica Sargent 09/25/2019 11:10 AM

## 2019-09-25 NOTE — PROGRESS NOTES
Hospitalist update note    73 year old male with hx of COPD with ongoing tobacco use, HTN, and prior CVA who was admitted earlier this morning by Dr. Darby for acute CVA. Reports persistent left-sided numbness/tingling, virtually unchanged.    Stroke work-up thus far:  * Head CT on arrival showed no acute intracranial pathology, but CTA head/neck showed occlusion involving the P1 segment of the right PCA. Brain MRI (9/25) showed acute infarcts in the right thalamus and right hippocampus as well as infarcts in the right occipital lobe that were felt to be more subacute.  * A1c 5.5, TSH normal, lipid panel: Tchol 121, HDL 26, LDL 72    Multiple right-sided acute ischemic strokes  - TTE/bubble study pending  - He has been started on aspirin 81 mg daily in addition to PTA Plavix 75 mg daily  - Change PTA pravastatin to atorvastatin 40 mg daily  - Holding PTA metoprolol for permissive hypertension  - SLP recommending DDL3 with thin liquids  - PT/OT still to see  - Formal Neurology consult pending, will follow up on siria Barth MD  Hospitalist  [phone]

## 2019-09-25 NOTE — CONSULTS
"Resident/Fellow Attestation   I, Leigh Ann Faria, was present with the medical student who participated in the service and in the documentation of the note.  I have verified the history and personally performed the physical exam and medical decision making.  I agree with the assessment and plan of care as documented in the note.      73 year-old male with a PMH including HTN, HLD, TIA, CVA and tobacco use that presented yesterday with left face and leg numbness. Found to have R P1 segment occlusion, MRI brain shows multifocal R PCA infarcts including right thalamus, right hippocampus and right occipital lobe. Patient certainly has risk of athreosclerosis, however cardioembolic etiology is not out of question. Thus stroke likely due to intracranial atherosclerosis however will send patient out on 30 day event monitor as well to assess for cardioembolic cause.    Recommendations  [] Continue ASA 81 , plavix 75 mg both for 90 days, then monotherapy of either agent of outpatient neurologist's choice  [] 30 day event monitor on discharge  [] Please set up follow up in Dr Luis Fernando Cortes M.D. , patient's primary neurologist/stroke doctor, 2-4 weeks post discharge  [] Stroke team will sign off     Leigh Ann Faria MD  Vascular Neurology Fellow  09/25/2019 5:11 PM    To page stroke neurology, click here: [facility]  Choose \"On Call\" tab at top, then search dropdown box for \"Neurology Adult\" & press Enter, look for Neuro ICU/Stroke      [facility]    Stroke Consult Note    Reason for Consult:  stroke    Chief Complaint: Numbness       HPI  Emmanuel Clinton is a 73 year old male with a past medical history that includes HTN, HLD, COPE, TIA in 05/2002 and CVA in 04/2006 that was anticoagulated with Plavix 75 mg that presented to the ED last night with left face and leg weakness. At 1900 last night, pt reports sudden onset tingling around lips and on the left side of his face with a headache. At that time, pt took aspirin and " noted difficulty in swallowing. He also noted tingling of left fingers with associated numbness. Previous TIA exhibited symptoms on the right side of body. When tingling sensation progressively worsened with addition of slurred speech, pt called EMS. Pt felt unsteady with walking although he usually uses cane due to ataxia following CVA in 2006. On admission to ED at 2330, team noted slurred speech and L facial droop. Pt was not given tPA due to presenting outside of the window. Today, pt notes improvement in numbness in leg but endorses continued numbness in face. He is currently on  mg and Plavix 75 mg. He is otherwise doing well.     Pt has a history of HTN that was well-controlled with metoprolol 100mg. At-home SBP readings were between 140's-160's. Pt's HLD controlled with pravastatin 80 mg. He is a lifelong smoker that smokes 6 cigarettes per day, previously was a 2 ppd smoker. Admits to alcohol use but did not disclose amount. No history of diabetes. Family history of stroke from his mother.     CTA imaging indicated vessel cutoff involving P1 of R PCA. MRI findings showed multiple acute infarcts in R medial and lateral thalamus, R temporal lobe in hippocampal formation, and R medial and inferior occipital lobe.     Labs:  BMP: Glucose 104 (H), GFR 51 (L), o/w WNL (Creatinine 1.34 (H))  LDL cholesterol: 72  A1C: 5.5      Impression  Ischemic Stroke due to large-artery atherosclerosis (embolus/thrombosis)   Pt is a 73 year-old male with a PMH including HTN, HLD, TIA, CVA and tobacco use that presented yesterday with left face and leg numbness. Given the pt's presentation, physical exam, and imaging findings, symptoms are attributed to multiple acute infarcts in the R thalamus, temporal lobe, and occipital lobe. Pt displays multiple risk factors for atherosclerosis, including HTN, HLD, and tobacco use. Pending echo to investigate possible embolic etiology.     Recommendations  Acute Ischemic Stroke  (without tPA) Recommendations  - Permissive HTN; labetalol PRN for SBP > 220  - Dual anti-platelet regimen of aspirin 81 mg and Plavix 75 mg for 3 months   - Statin: switch to atorvastatin 40mg daily from pravastatin 80mg   - Telemetry, EKG, Echo: EF 60%, no PFO  - PT/OT/SLP  - Stroke Education  - Smoking Cessation  - Euthermia, Euglycemia    Patient Follow-up    - final recommendation pending work-up    Thank you for this consult. We will continue to follow.     Zoila Zander, MS3    _____________________________________________________    Past Medical History   Past Medical History:   Diagnosis Date     Bronchitis      COPD (chronic obstructive pulmonary disease) (H)      Depression      Eczema      H/O: stroke      Hyperlipidemia      Hypertension      Low back pain      Pelvic fracture (H)      Psoriasis      Pulmonary nodule      TIA (transient ischemic attack)      Past Surgical History   History reviewed. No pertinent surgical history.  Medications   Home Meds  Prior to Admission medications    Medication Sig Start Date End Date Taking? Authorizing Provider   acetaminophen (TYLENOL) 500 MG tablet Take 1,000 mg by mouth every 6 hours as needed  4/7/16  Yes Reported, Patient   albuterol (PROAIR HFA/PROVENTIL HFA/VENTOLIN HFA) 108 (90 BASE) MCG/ACT Inhaler Inhale 2 puffs into the lungs every 6 hours as needed for shortness of breath / dyspnea or wheezing 11/26/17  Yes Lorin Valenzuela MD   BEVESPI AEROSPHERE 9-4.8 MCG/ACT oral inhaler Inhale 2 puffs into the lungs 2 times daily  1/3/18  Yes Reported, Patient   clopidogrel (PLAVIX) 75 MG tablet Take 75 mg by mouth daily  5/1/06  Yes Reported, Patient   loratadine (CLARITIN) 10 MG tablet Take 10 mg by mouth daily as needed for allergies   Yes Unknown, Entered By History   metoprolol (TOPROL-XL) 100 MG 24 hr tablet Take 100 mg by mouth daily  11/6/17  Yes Reported, Patient   Multiple Vitamins-Minerals (CENTRUM SILVER) per tablet Take 1 tablet by mouth daily    Yes Unknown, Entered By History   temazepam (RESTORIL) 15 MG capsule Take 15 mg by mouth nightly as needed for sleep   Yes Unknown, Entered By History   triamcinolone (KENALOG) 0.1 % cream Apply topically daily  9/18/17  Yes Reported, Patient   vitamin D3 (CHOLECALCIFEROL) 2000 units (50 mcg) tablet Take 2,000 Units by mouth daily   Yes Unknown, Entered By History   pravastatin (PRAVACHOL) 80 MG tablet Take 80 mg by mouth every evening  1/2/18   Reported, Patient   traZODone (DESYREL) 50 MG tablet Take 50 mg by mouth At Bedtime  9/5/17   Reported, Patient       Scheduled Meds    sodium chloride 0.9 %  100 mL Intravenous Once     aspirin  325 mg Oral Daily    Or     aspirin  300 mg Rectal Daily     clopidogrel  75 mg Oral Daily     metoprolol succinate ER  50 mg Oral Daily     pravastatin  80 mg Oral QPM     sodium chloride (PF)  3 mL Intracatheter Q8H     umeclidinium-vilanterol  1 puff Inhalation Daily       Infusion Meds    - MEDICATION INSTRUCTIONS -       sodium chloride 100 mL/hr at 09/25/19 1029       PRN Meds  albuterol, hydrochlorothiazide, labetalol, lidocaine 4%, lidocaine (buffered or not buffered), - MEDICATION INSTRUCTIONS -, melatonin, naloxone, sodium chloride (PF)    Allergies   No Known Allergies  Family History   Family History   Problem Relation Age of Onset     Diabetes Brother      Social History   Social History     Tobacco Use     Smoking status: Current Every Day Smoker     Packs/day: 0.00     Types: Cigars     Smokeless tobacco: Never Used     Tobacco comment: 6 cigs/day   Substance Use Topics     Alcohol use: No     Alcohol/week: 0.0 standard drinks     Drug use: No       Review of Systems   The 10 point Review of Systems is negative other than noted in the HPI or here.        PHYSICAL EXAMINATION   Temp:  [97.6  F (36.4  C)-98.4  F (36.9  C)] 97.6  F (36.4  C)  Pulse:  [64-68] 66  Heart Rate:  [62-68] 62  Resp:  [] 18  BP: (146-160)/(65-89) 149/80  SpO2:  [90 %-96 %] 93 %    General:   patient lying in bed without any acute distress    HEENT:  normocephalic/atraumatic  Cardio:  RRR  Pulmonary:  no respiratory distress  Abdomen:  soft, non-tender  Extremities:  no edema  Skin:  intact     Neurologic  Mental Status:  alert, oriented x 3, follows commands, speech clear and fluent, naming and repetition normal  Cranial Nerves:  visual fields intact, PERRL, EOMI with normal smooth pursuit, facial movements symmetric, decreased facial sensation on upper 2/3 of face, otherwise facial sensation intact bilaterally, hearing not formally tested but intact to conversation, palate elevation symmetric and uvula midline, no dysarthria, shoulder shrug strong bilaterally, tongue protrusion midline  Motor:  normal muscle tone and bulk, no abnormal movements, able to move all limbs spontaneously, strength 5/5 throughout upper and lower extremities  Reflexes:  toes down-going  Sensory:  light touch sensation intact and symmetric throughout upper and lower extremities, no extinction on double simultaneous stimulation   Coordination:  heel-to-shin bilaterally without dysmetria, smooth coordination of L finger-to-nose although pt has less precision than R   Station/Gait:  deferred    Dysphagia Screen  Per Nursing    Stroke Scales    NIHSS  Interval baseline (09/25/19 0200)   Interval Comments     1a. Level of Consciousness 0-->Alert, keenly responsive   1b. LOC Questions 0-->Answers both questions correctly   1c. LOC Commands 0-->Performs both tasks correctly   2.   Best Gaze 0-->Normal   3.   Visual 1-->Partial hemianopia(LLQ of eye)   4.   Facial Palsy 1-->Minor paralysis (flattened nasolabial fold, asymmetry on smiling)(slight L droop)   5a. Motor Arm, Left 1-->Drift, limb holds 90 (or 45) degrees, but drifts down before full 10 seconds, does not hit bed or other support   5b. Motor Arm, Right 0-->No drift, limb holds 90 (or 45) degrees for full 10 secs   6a. Motor Leg, Left 0-->No drift, leg holds 30 degree  position for full 5 secs   6b. Motor Leg, right 0-->No drift, leg holds 30 degree position for full 5 secs   7.   Limb Ataxia 1-->Present in one limb(LUE)   8.   Sensory 1-->Mild-to-moderate sensory loss, patient feels pinprick is less sharp or is dull on the affected side, or there is a loss of superficial pain with pinprick, but patient is aware of being touched   9.   Best Language 0-->No aphasia, normal   10. Dysarthria 0-->Normal   11. Extinction and Inattention  0-->No abnormality   Total 5 (09/25/19 0200)       Imaging  I personally reviewed all imaging; relevant findings per HPI.    Labs Data   CBC  Recent Labs   Lab 09/25/19 0314 09/24/19 2327   WBC 7.5 8.0   RBC 4.28* 4.34*   HGB 14.1 14.3   HCT 40.9 41.0    270     Basic Metabolic Panel   Recent Labs   Lab 09/25/19 0314 09/24/19 2327    135   POTASSIUM 4.5 4.1   CHLORIDE 103 104   CO2 28 26   BUN 15 17   CR 1.35* 1.34*   GLC 99 104*   DANIEL 8.4* 8.7     Liver Panel  Recent Labs   Lab Test 09/25/19 0314   PROTTOTAL 6.4*   ALBUMIN 3.6   BILITOTAL 0.4   ALKPHOS 38*   AST 14   ALT 17     INR  Recent Labs   Lab Test 09/24/19 2327   INR 0.99      Lipid Profile  Recent Labs   Lab Test 09/25/19 0314   CHOL 121   HDL 26*   LDL 72   TRIG 117     A1C  Recent Labs   Lab Test 09/25/19 0314 12/26/17  0820   A1C 5.5 6.1*     Troponin I  Recent Labs   Lab 09/25/19 0314 09/24/19 2327   TROPI <0.015 <0.015

## 2019-09-25 NOTE — ED NOTES
"[facility]  ED Nurse Handoff Report    ED Chief complaint: Numbness      ED Diagnosis:   Final diagnoses:   Acute ischemic stroke (H)       Code Status: Full Code    Allergies: No Known Allergies    Activity level - Baseline/Home:  Independent cane  Activity Level - Current:   Stand with Assist and cane    Patient's Preferred language: english   Needed?: No    Isolation: No  Infection: Not Applicable  Bariatric?: No    Vital Signs:   Vitals:    09/25/19 0023 09/25/19 0024 09/25/19 0030 09/25/19 0040   BP:   (!) 148/65 (!) 156/79   Pulse:   64    Resp:   (!) 76 16   Temp:       TempSrc:       SpO2: 90% 94% 93% 95%   Weight:       Height:           Cardiac Rhythm: ,   Cardiac  Cardiac Rhythm: Normal sinus rhythm    Pain level:      Is this patient confused?: No   Does this patient have a guardian?  No         If yes, is there guardianship documents in the Epic \"Code/ACP\" activity?  N/A         Guardian Notified?  N/A  [place] - Suicide Severity Rating Scale Completed?  Yes  If yes, what color did the patient score?  White    Patient Report: Initial Complaint: 1900 began having left sided facial numbness with left sided weakness. Had headache earlier that resolved with tylenol. On arrival + left arm drift with tingling to bilateral legs and left facial numbness. Slight left facial droop. Difficulty with left finger pointing  Focused Assessment: no change in initial assessment  Tests Performed: lab, CT  Abnormal Results:   Results for orders placed or performed during the hospital encounter of 09/24/19   CT Head w/o Contrast    Narrative    EXAM: CT HEAD W/O CONTRAST  LOCATION: [facility]  DATE/TIME: 9/25/2019 12:20 AM    INDICATION: Left-sided facial numbness along with bilateral lower extremity numbness.  COMPARISON: 04/21/2006.  CONTRAST: 50 mL of Isovue 350.  TECHNIQUE: Head and neck CT angiogram with IV contrast. Noncontrast head CT followed by axial helical CT images of " the head and neck vessels obtained during the arterial phase of intravenous contrast administration. Axial 2D reconstructed images and   multiplanar 3D MIP reconstructed images of the head and neck vessels were performed by the technologist. Dose reduction techniques were used.     FINDINGS:   NONCONTRAST HEAD CT:   INTRACRANIAL CONTENTS: No intracranial hemorrhage, extraaxial collection, or mass effect.  No CT evidence of acute infarct. There is scattered low-attenuation within the periventricular and subcortical white matter consistent with mild vessel ischemic   disease. Possible vague low attenuation medial right occipital lobe versus volume averaging.  The ventricular system, basal cisterns and the cortical sulci are consistent with diffuse volume loss.     VISUALIZED ORBITS/SINUSES/MASTOIDS: No intraorbital abnormality. No paranasal sinus mucosal disease. No middle ear or mastoid effusion.    BONES/SOFT TISSUES: No acute abnormality.    HEAD CTA:  ANTERIOR CIRCULATION: No stenosis/occlusion, aneurysm, or high flow vascular malformation. Standard [place] anatomy.    POSTERIOR CIRCULATION: There is a vessel cutoff involving the midportion of the P1 segment of the right posterior cerebral artery with no contrast visualized distally. This is best seen on thin cut axial image #228 and sagittal image #80. No other   discrete vessel cutoff, aneurysm or high flow vascular malformation is visualized. Balanced vertebral arteries supply a normal basilar artery.     DURAL VENOUS SINUSES: Expected enhancement of the major dural venous sinuses.    NECK CTA:  RIGHT CAROTID: No measurable stenosis or dissection.    LEFT CAROTID: No measurable stenosis or dissection.    VERTEBRAL ARTERIES: No focal stenosis or dissection. Balanced vertebral arteries.    AORTIC ARCH: Classic aortic arch anatomy with no significant stenosis at the origin of the great vessels.    NONVASCULAR STRUCTURES: The cervical spine is grossly  unremarkable. There is pleural-parenchymal scarring involving the right lung apex.        Impression    IMPRESSION:   HEAD CT:  1.  No discrete mass lesion, hemorrhage or large acute territorial infarct by CT.   2.  Possible vague edema involving medial right occipital lobes.   3.  Diffuse age related changes.    HEAD CTA:   1.  Vessel cutoff consistent with occlusion involving midportion of P1 segment of right posterior cerebral artery with no contrast seen distally.   2.  No other discrete vessel occlusion, aneurysm or high flow vascular malformation involving arteries of the [place].    NECK CTA:  1.  No significant stenosis in the neck vessels based on NASCET criteria.  2.  No evidence for dissection.    Head CT findings were communicated to Dr. Colon at 12:05 AM. CTA images were received at 12:35 AM. CTA images findings were reported to Dr. Colon by phone at 12:49 AM on 09/25/2019.      [Critical Result: Right posterior cerebral artery occlusion in midportion of P1 segment.]    Finding was identified on 9/25/2019 12:35 AM.     Dr. Colon was contacted by Dr. Hdz on 9/25/2019 12:55 AM and verbalized understanding of the critical result.    Basic metabolic panel   Result Value Ref Range    Sodium 135 133 - 144 mmol/L    Potassium 4.1 3.4 - 5.3 mmol/L    Chloride 104 94 - 109 mmol/L    Carbon Dioxide 26 20 - 32 mmol/L    Anion Gap 5 3 - 14 mmol/L    Glucose 104 (H) 70 - 99 mg/dL    Urea Nitrogen 17 7 - 30 mg/dL    Creatinine 1.34 (H) 0.66 - 1.25 mg/dL    GFR Estimate 51 (L) >60 mL/min/[1.73_m2]    GFR Estimate If Black 59 (L) >60 mL/min/[1.73_m2]    Calcium 8.7 8.5 - 10.1 mg/dL   CBC with platelets differential   Result Value Ref Range    WBC 8.0 4.0 - 11.0 10e9/L    RBC Count 4.34 (L) 4.4 - 5.9 10e12/L    Hemoglobin 14.3 13.3 - 17.7 g/dL    Hematocrit 41.0 40.0 - 53.0 %    MCV 95 78 - 100 fl    MCH 32.9 26.5 - 33.0 pg    MCHC 34.9 31.5 - 36.5 g/dL    RDW 12.6 10.0 - 15.0 %    Platelet Count 270  150 - 450 10e9/L    Diff Method Automated Method     % Neutrophils 36.5 %    % Lymphocytes 40.8 %    % Monocytes 20.5 %    % Eosinophils 1.6 %    % Basophils 0.2 %    % Immature Granulocytes 0.4 %    Nucleated RBCs 0 0 /100    Absolute Neutrophil 2.9 1.6 - 8.3 10e9/L    Absolute Lymphocytes 3.3 0.8 - 5.3 10e9/L    Absolute Monocytes 1.6 (H) 0.0 - 1.3 10e9/L    Absolute Eosinophils 0.1 0.0 - 0.7 10e9/L    Absolute Basophils 0.0 0.0 - 0.2 10e9/L    Abs Immature Granulocytes 0.0 0 - 0.4 10e9/L    Absolute Nucleated RBC 0.0     RBC Morphology Normal     Platelet Estimate       Automated count confirmed.  Platelet morphology is normal.   INR   Result Value Ref Range    INR 0.99 0.86 - 1.14   Partial thromboplastin time   Result Value Ref Range    PTT 35 22 - 37 sec   Troponin I   Result Value Ref Range    Troponin I ES <0.015 0.000 - 0.045 ug/L   EKG 12-lead, tracing only   Result Value Ref Range    Interpretation ECG Click View Image link to view waveform and result      Treatments provided:     Family Comments:     OBS brochure/video discussed/provided to patient/family: N/A              Name of person given brochure if not patient:               Relationship to patient:     ED Medications:   Medications   100mL Saline flush (has no administration in time range)   iopamidol (ISOVUE-370) solution 120 mL (120 mLs Intravenous Given 9/24/19 2328)   0.9% sodium chloride BOLUS (0 mLs Intravenous Stopped 9/25/19 0028)       Drips infusing?:  No    For the majority of the shift this patient was Green.   Interventions performed were .    Severe Sepsis OR Septic Shock Diagnosis Present: No    To be done/followed up on inpatient unit:      ED NURSE PHONE NUMBER: [phone]

## 2019-09-25 NOTE — PLAN OF CARE
Pt arrived to unit floor at approx 0200. Here for CVA workup. A&O x4. Failed bedside swallow d/t slight L droop. Other neuros include facial numbness to L side, tingling around mouth, L-sided hemiparesis with numb/tingling to extremity. Weakness also noted to BLE, unsteady gait when walking with assist x1 w/ GB/cane.  VSS. Tele NSR. Baseline NIH score: 5. Up with assist x1 w/ cane/GB. Denies pain. Reported feeling slightly anxious at 0400, decreased with relaxation and breathing exercises, along with aromatherapy. During 0600 assessment, pt stated that tingling around mouth now resolved. Rest of neuros unchanged. Pt scoring green on the Aggression Stop Light Tool. Plan for MRI, Echo, and neuro consult. Discharge plan pending. Continue monitoring.

## 2019-09-25 NOTE — H&P
[facility]    History and Physical  Hospitalist       Date of Admission:  9/24/2019  Date of Service (when I saw the patient): 09/25/19    Assessment & Plan   Emmanuel Clinton is a 73 year old male who presents with numbness    Cerebrovascular accident  H/o TIA  H/o L cerebellar CVA  Evening of presentation with onset of L finger numbness/ tingling, dysphagia, slurred speech. Some balance issues with EMS. In ED CT head without bleed but with evidence of vessel cutoff consistent with occlusion involving midportion of P1 segment of R PCA with no contrast seen distally. Neuro CVA involved, not tpa candidate and no IR intervention (Dr. Gonzalez). Troponin negative. EKG normal  - q2 neuro checks x 4 then q4 hour  - telemetry  - serial troponins  - MRI brain (will need lorazepam)  - echo  - lipids, A1C, TSH  - cont plavix  - cont pravastatin  - PT/OT/SLP  - NPO pending evaluation, IV fluids  - prn labetalol SBP >220    HTN  HLD  PTA on hydrochlorothiazide 12.5 mg daily, Toprol 50 mg daily. Slightly hypertensive on presentation to 140-150 systolic.   - continue PTA meds  - prn labetalol SBP >220    COPD  PTA on albuterol prn, Bevespi Aerosphere 2 puffs BID. No acute issues.  - continue PTA meds    Depression  PTA citalopram 10 mg daily, Trazodone 50 mg daily per old records  - restart when med rec available    CKD  Creatinine up at 1.34 on admission but appears to be approx baseline  - follow for now    DVT Prophylaxis: Pneumatic Compression Devices  Code Status: Full Code    Disposition: Expected discharge in ~2 days pending CVA workup    Wallace Darby MD  [phone] (P)  Text Page     Primary Care Physician   Dr. Nicolette Carlson    Chief Complaint   R sided numbness    History is obtained from the patient and medical records    History of Present Illness   Emmanuel Clinton is a 73 year old male who presents with right-sided numbness.  He has a past medical history remarkable for CVA, hypertension,  "hyperlipidemia, COPD, depression and CKD.  He reports that on September 5 he fell and hit his head on the right side.  This evening he started to have tingling in his left lip area and then started to involve his entire left face.  He also has had a headache on and off over the last 2 weeks secondary to his fall.  He tried to swallow some Tylenol but he \"could not get them down \".  This subsequently progressed with some left arm numbness tingling and possible weakness.  With this he is denied fevers or chills.  Denies chest pain.  Denies palpitations.  No abdominal pain.  He currently does not have a headache.  He has no urine symptoms.    Past Medical History    I have reviewed this patient's medical history and updated it with pertinent information if needed.   Past Medical History:   Diagnosis Date     Bronchitis      COPD (chronic obstructive pulmonary disease) (H)      Depression      Eczema      H/O: stroke      Hyperlipidemia      Hypertension      Low back pain      Pelvic fracture (H)      Psoriasis      Pulmonary nodule      TIA (transient ischemic attack)        Past Surgical History   I have reviewed this patient's surgical history and updated it with pertinent information if needed.  History reviewed. No pertinent surgical history.    Prior to Admission Medications   Prior to Admission Medications   Prescriptions Last Dose Informant Patient Reported? Taking?   BEVESPI AEROSPHERE 9-4.8 MCG/ACT oral inhaler   Yes No   Sig: INL 2 PFS PO BID IN THE MORNING AND IN THE JUNE   Cholecalciferol (VITAMIN D3) 1000 UNITS CAPS   Yes No   Sig: Take 1,000 Units by mouth daily    LYCOPENE PO   Yes No   Sig: Take 1 tablet by mouth   Multiple Vitamins-Minerals (CENTRUM SILVER) per tablet   Yes No   Sig: Take 1 tablet by mouth daily   acetaminophen (TYLENOL) 500 MG tablet   Yes No   Sig: Take 1,000 mg by mouth every 6 hours as needed    acetaminophen-codeine (TYLENOL #3) 300-30 MG per tablet   Yes No   Sig: TK 1 T PO  Q 4 " H PRN. NM4   albuterol (PROAIR HFA/PROVENTIL HFA/VENTOLIN HFA) 108 (90 BASE) MCG/ACT Inhaler   No No   Sig: Inhale 2 puffs into the lungs every 6 hours as needed for shortness of breath / dyspnea or wheezing   amoxicillin-clavulanate (AUGMENTIN) 875-125 MG per tablet   Yes No   Sig: TK 1 T PO BID WC FOR 10 DAYS   azithromycin (ZITHROMAX) 250 MG tablet   Yes No   cetirizine (ZYRTEC) 10 MG tablet   Yes No   Sig: TK 1 T PO D   chlorhexidine (PERIDEX) 0.12 % solution   Yes No   Sig: SWISH 15 ML PO FOR 1 MINUTE AND EXPECTORATE QAM AND QHS FOR 7 DAYS   citalopram (CELEXA) 10 MG tablet   Yes No   Sig: Take 10 mg by mouth   clopidogrel (PLAVIX) 75 MG tablet   Yes No   Sig: Take 75 mg by mouth daily    diazepam (VALIUM) 5 MG tablet   Yes No   guaiFENesin-codeine (ROBITUSSIN AC) 100-10 MG/5ML SOLN solution   No No   Sig: Take 5 mLs by mouth every 4 hours as needed for cough   hydrochlorothiazide (MICROZIDE) 12.5 MG capsule   Yes No   Sig: TK ONE C PO  QAM TO  LOWER BP AND REMOVE FLUID   metoprolol (TOPROL-XL) 100 MG 24 hr tablet   Yes No   Sig: Take 100 mg by mouth daily    metoprolol succinate (TOPROL-XL) 50 MG 24 hr tablet   Yes No   Sig: TK 1 T PO QD   nicotine (NICODERM CQ) 14 MG/24HR 24 hr patch   No No   Sig: Place 1 patch onto the skin every 24 hours   pravastatin (PRAVACHOL) 20 MG tablet   Yes No   Sig: Take 80 mg by mouth every evening    pravastatin (PRAVACHOL) 80 MG tablet   Yes No   Sig: TK 1 T PO QD WITH THE JUNE MEAL   predniSONE (DELTASONE) 10 MG tablet   No No   Sig: Take 2 tabs (20 mg) by mouth daily x 3 days, 1 tabs (10 mg) daily x 3 days then stop 1   predniSONE (DELTASONE) 20 MG tablet   Yes No   Sig: TK 1 T PO QD WAC   traZODone (DESYREL) 50 MG tablet   Yes No   Sig: TK 1 T PO QHS prn   triamcinolone (KENALOG) 0.1 % cream   Yes No   Sig: APPLY TOPICALLY BID TO RASH UTD   triamcinolone (NASACORT AQ) 55 MCG/ACT Inhaler   Yes No   Sig: Spray 1 spray into both nostrils daily      Facility-Administered  Medications: None     Allergies   No Known Allergies    Social History   I have reviewed this patient's social history and updated it with pertinent information if needed. Emmanuel Clinton  reports that he has been smoking cigars. He has been smoking about 0.00 packs per day. He has never used smokeless tobacco. He reports that he does not drink alcohol or use drugs.    Family History   I have reviewed this patient's family history and updated it with pertinent information if needed.   Family History   Problem Relation Age of Onset     Diabetes Brother        Review of Systems   The 10 point Review of Systems is negative other than noted in the HPI or here.     Physical Exam   Temp: 98.4  F (36.9  C) Temp src: Temporal BP: (!) 156/79 Pulse: 64 Heart Rate: 63 Resp: 16 SpO2: 95 % O2 Device: None (Room air)    Vital Signs with Ranges  174 lbs 9.67 oz    Constitutional: alert, oriented and in no acute distress  Eyes: EOMI, PERRL  HEENT: OP clear  Respiratory: CTA B without w/c  Cardiovascular: RRR without m/r/g  GI: soft, nontender, nondistended, no HSM  Lymph/Hematologic: no cervical LAD  Genitourinary: deferred  Skin: no rashes or lesions grossly  Musculoskeletal: no deformities or arthritis  Neurologic: cranial nerves intact but noted L facial droop (does improve with smiling). Still facial numbness. L upper arm strength and sensation appear grossly intact. Lower extremity strength is grossly intact. Some pronator drift on L  Psychiatric: mood and affect wnl    Data   Data reviewed today:  I personally reviewed the head CT image(s) showing no bleed but cutoff as above. EKG normal  Recent Labs   Lab 09/24/19  2327   WBC 8.0   HGB 14.3   MCV 95      INR 0.99      POTASSIUM 4.1   CHLORIDE 104   CO2 26   BUN 17   CR 1.34*   ANIONGAP 5   DANIEL 8.7   *   TROPI <0.015       Recent Results (from the past 24 hour(s))   CT Head w/o Contrast    Narrative    EXAM: CT HEAD W/O CONTRAST  LOCATION: [facility]  DATE/TIME: 9/25/2019 12:20 AM    INDICATION: Left-sided facial numbness along with bilateral lower extremity numbness.  COMPARISON: 04/21/2006.  CONTRAST: 50 mL of Isovue 350.  TECHNIQUE: Head and neck CT angiogram with IV contrast. Noncontrast head CT followed by axial helical CT images of the head and neck vessels obtained during the arterial phase of intravenous contrast administration. Axial 2D reconstructed images and   multiplanar 3D MIP reconstructed images of the head and neck vessels were performed by the technologist. Dose reduction techniques were used.     FINDINGS:   NONCONTRAST HEAD CT:   INTRACRANIAL CONTENTS: No intracranial hemorrhage, extraaxial collection, or mass effect.  No CT evidence of acute infarct. There is scattered low-attenuation within the periventricular and subcortical white matter consistent with mild vessel ischemic   disease. Possible vague low attenuation medial right occipital lobe versus volume averaging.  The ventricular system, basal cisterns and the cortical sulci are consistent with diffuse volume loss.     VISUALIZED ORBITS/SINUSES/MASTOIDS: No intraorbital abnormality. No paranasal sinus mucosal disease. No middle ear or mastoid effusion.    BONES/SOFT TISSUES: No acute abnormality.    HEAD CTA:  ANTERIOR CIRCULATION: No stenosis/occlusion, aneurysm, or high flow vascular malformation. Standard [place] anatomy.    POSTERIOR CIRCULATION: There is a vessel cutoff involving the midportion of the P1 segment of the right posterior cerebral artery with no contrast visualized distally. This is best seen on thin cut axial image #228 and sagittal image #80. No other   discrete vessel cutoff, aneurysm or high flow vascular malformation is visualized. Balanced vertebral arteries supply a normal basilar artery.     DURAL VENOUS SINUSES: Expected enhancement of the major dural venous sinuses.    NECK CTA:  RIGHT CAROTID: No measurable stenosis or  dissection.    LEFT CAROTID: No measurable stenosis or dissection.    VERTEBRAL ARTERIES: No focal stenosis or dissection. Balanced vertebral arteries.    AORTIC ARCH: Classic aortic arch anatomy with no significant stenosis at the origin of the great vessels.    NONVASCULAR STRUCTURES: The cervical spine is grossly unremarkable. There is pleural-parenchymal scarring involving the right lung apex.        Impression    IMPRESSION:   HEAD CT:  1.  No discrete mass lesion, hemorrhage or large acute territorial infarct by CT.   2.  Possible vague edema involving medial right occipital lobes.   3.  Diffuse age related changes.    HEAD CTA:   1.  Vessel cutoff consistent with occlusion involving midportion of P1 segment of right posterior cerebral artery with no contrast seen distally.   2.  No other discrete vessel occlusion, aneurysm or high flow vascular malformation involving arteries of the [place].    NECK CTA:  1.  No significant stenosis in the neck vessels based on NASCET criteria.  2.  No evidence for dissection.    Head CT findings were communicated to Dr. Colon at 12:05 AM. CTA images were received at 12:35 AM. CTA images findings were reported to Dr. Colon by phone at 12:49 AM on 09/25/2019.      [Critical Result: Right posterior cerebral artery occlusion in midportion of P1 segment.]    Finding was identified on 9/25/2019 12:35 AM.     Dr. Colon was contacted by Dr. Hdz on 9/25/2019 12:55 AM and verbalized understanding of the critical result.    CT Head Perfusion w Contrast    Narrative    EXAM: CT HEAD PERFUSION WITH CONTRAST  LOCATION: [facility]  DATE/TIME: 9/25/2019 12:30 AM    INDICATION: Left-sided facial numbness and lower extremity numbness.  COMPARISON: CTA 09/24/2019.  TECHNIQUE: CT cerebral perfusion was performed utilizing a second contrast bolus. Perfusion data were post processed with generation of standard perfusion maps and estimation of ischemic/infarcted  volumes utilizing standard threshold values. Dose   reduction techniques were used.  All stenosis measurements made according to NASCET criteria unless otherwise specified.  CONTRAST: 50mL Isovue-370    FINDINGS:    CT PERFUSION:  PERFUSION MAPS: Fairly symmetrical perfusion on the cerebral blood flow, cerebral blood volume and time to peak map. This is a possible slight delay on the time to max on image #54 in the posterior medial right temporal to right occipital lobes.    RAPID ANALYSIS:  CBF<30%: 0 ml.  Tmax>6sec: 3 58 mL  Mismatch volume: 3 58 mL  Mismatch ratio: Infinite.      Impression    IMPRESSION:   CT PERFUSION:  1.  No significant mismatch ratio or volume seen on the perfusion imaging.            be informed

## 2019-09-25 NOTE — PROGRESS NOTES
RECEIVING UNIT ED HANDOFF REVIEW    ED Nurse Handoff Report was reviewed by: Allegra Aguayo RN on September 25, 2019 at 1:26 AM

## 2019-09-25 NOTE — PROGRESS NOTES
"   09/25/19 0651   General Information   Patient is receptive to smoking cessation at this time Yes   Packs Per Day 0.6 PPD   Years Smoked (#) 55 yrs   Cigarette Pack Years 33   Stage of Behavior Change   Patient's Stage of Behavior Change Pre-contemplation \"I won't\"   Processes of Change   The following interventions were used (smoking cessation) Increase awareness of effects of smoking on health;Increase awareness of how smoking affects others   Motivation to Quit Scale (1-10) 5   Confidence to Quit Scale (1-10) 5   Education/Recommendations   Education QUIT PLAN phone line   Treatment Time (Minutes) 3 minutes   Total Evaluation Time   Total Evaluation Time (Minutes) 3       Pt not very motivated to quit but was open to hearing more information. Provided handouts.   "

## 2019-09-25 NOTE — PLAN OF CARE
A/Ox 4. Neuros intact ex L hemiparesis LUE (4/5), LUE ataxia, LUE pronator drift, LLE (4-5/5), ataxic gait, legally blind at baseline (some difficulty with eye exam), L face, arm, leg numbness & tingling (improving since admission per pt). Lung sounds wheezy. Tele NSR. VSS ex HTN (within parameters). Assist with 1 GB &walker. DD3  diet, with thin liquids no straw. BS active, +flatus. Denies pain. Skin ex rash on LLE. Tests/procedures: echo & MRI done. Therapies recommending OP PT & OT. Plan for discharge tomorrow. Has PLC education tomorrow at 1130--pt updated.

## 2019-09-25 NOTE — PROGRESS NOTES
09/25/19 1400   Quick Adds   Type of Visit Initial PT Evaluation   Living Environment   Lives With spouse   Living Arrangements house   Transportation Anticipated family or friend will provide   Living Environment Comment One threshold to enter. Flight to basement, not needed   Self-Care   Usual Activity Tolerance moderate   Current Activity Tolerance moderate   Regular Exercise No   Equipment Currently Used at Home cane, straight;shower chair   Activity/Exercise/Self-Care Comment IND/mod-I for mobiltity and ADLs   Functional Level Prior   Ambulation 1-->assistive equipment   Transferring 0-->independent   Toileting 0-->independent   Bathing 1-->assistive equipment   Fall history within last six months yes   Number of times patient has fallen within last six months 1   Which of the above functional risks had a recent onset or change? ambulation;transferring;fall history   Prior Functional Level Comment IND at baseline. Does endorse poor balance from residual effects from previous CVA to RLE   General Information   Onset of Illness/Injury or Date of Surgery - Date 09/24/19   Referring Physician Wallace Darby MD   Patient/Family Goals Statement Home   Pertinent History of Current Problem (include personal factors and/or comorbidities that impact the POC) Pt admitted with L sided numb/tingling. Found to have acute ishemic CVA. Reports 2 CVAs in past.   Precautions/Limitations fall precautions  (SBP <220)   General Info Comments Spouse present, very concerned   Cognitive Status Examination   Orientation orientation to person, place and time   Level of Consciousness alert   Follows Commands and Answers Questions 100% of the time   Pain Assessment   Patient Currently in Pain   (L knee pain, pt planning to go to [facility])   Integumentary/Edema   Integumentary/Edema Comments Some bruising on extremity from fall   Posture    Posture Forward head position   Range of Motion (ROM)   ROM Comment WFL   Strength  "  Strength Comments R LE 5/5, L LE 4+/5 throughout   Bed Mobility   Bed Mobility Comments IND supind<>sit   Transfer Skills   Transfer Comments SBA sit<>stand, mild unsteadiness in static stance without UE support   Gait   Gait Comments Min-A with SEC, LOB to L requiring assist. Inconsistent cane sequencing   Balance   Balance Comments Unable to maintain feet together for 30sec, SLS >3sec, or obtain rhomberg. Questionable what he would be able to due at reported baseline   Sensory Examination   Sensory Perception Comments LT intact BLE, but reduced senesation reported on L   Coordination   Coordination Comments Dysmetria with LUE. Intanct heel/shin, MARYANA   General Therapy Interventions   Planned Therapy Interventions balance training;gait training;neuromuscular re-education;strengthening;transfer training;risk factor education;home program guidelines;progressive activity/exercise   Clinical Impression   Criteria for Skilled Therapeutic Intervention yes, treatment indicated   PT Diagnosis Impaired gait   Influenced by the following impairments Balance impairments, generalized weakness   Functional limitations due to impairments Decreased safety with transfers and gait   Clinical Presentation Stable/Uncomplicated   Clinical Presentation Rationale Clinical judgement   Clinical Decision Making (Complexity) Low complexity   Therapy Frequency Daily   Predicted Duration of Therapy Intervention (days/wks) 3 days   Anticipated Discharge Disposition Home with Outpatient Therapy   Risk & Benefits of therapy have been explained Yes   Patient, Family & other staff in agreement with plan of care Yes   Clinical Impression Comments Anticipate with further IP PT, pt will progress well to safely discharge home with walker sure and supervision with mobility. OP PT to continue to progress balance and gait.   [facility] AM-PAC TM \"6 Clicks\"   2016, Trustees of [facility], under license to [employer].  All rights reserved. " "  6 Clicks Short Forms Basic Mobility Inpatient Short Form   [facility] AM-PAC  \"6 Clicks\" V.2 Basic Mobility Inpatient Short Form   1. Turning from your back to your side while in a flat bed without using bedrails? 4 - None   2. Moving from lying on your back to sitting on the side of a flat bed without using bedrails? 4 - None   3. Moving to and from a bed to a chair (including a wheelchair)? 3 - A Little   4. Standing up from a chair using your arms (e.g., wheelchair, or bedside chair)? 3 - A Little   5. To walk in hospital room? 3 - A Little   6. Climbing 3-5 steps with a railing? 3 - A Little   Basic Mobility Raw Score (Score out of 24.Lower scores equate to lower levels of function) 20   Total Evaluation Time   Total Evaluation Time (Minutes) 12     "

## 2019-09-25 NOTE — PROGRESS NOTES
09/25/19 1440   Quick Adds   Type of Visit Initial Occupational Therapy Evaluation   Living Environment   Lives With spouse   Living Arrangements house   Transportation Anticipated family or friend will provide   Living Environment Comment tub shower with grab bars with chair    Self-Care   Usual Activity Tolerance moderate   Current Activity Tolerance moderate   Regular Exercise No   Equipment Currently Used at Home cane, straight;shower chair   Activity/Exercise/Self-Care Comment Mod IND with cane, has shower chair    Functional Level   Ambulation 1-->assistive equipment   Transferring 0-->independent   Toileting 0-->independent   Bathing 0-->independent   Dressing 0-->independent   Eating 0-->independent   Communication 0-->understands/communicates without difficulty   Swallowing 0-->swallows foods/liquids without difficulty   Cognition 0 - no cognition issues reported   Fall history within last six months yes   Number of times patient has fallen within last six months 1   Which of the above functional risks had a recent onset or change? toileting;bathing;eating;dressing   Prior Functional Level Comment IND at baseline    General Information   Onset of Illness/Injury or Date of Surgery - Date 09/24/19   Referring Physician Dr. Donohue   Patient/Family Goals Statement return home   Additional Occupational Profile Info/Pertinent History of Current Problem Brain MRI (9/25) showed acute infarcts in the right thalamus and right hippocampus as well as infarcts in the right occipital lobe that were felt to be more subacute.   Precautions/Limitations fall precautions   Cognitive Status Examination   Orientation orientation to person, place and time   Level of Consciousness alert   Follows Commands (Cognition) WNL   Memory intact   Attention Distractible during evaluation   Organization/Problem Solving No deficits were identified   Executive Function Impulsive   Cognitive Comment mildly impulsive    Visual Perception    Visual Perception Comments legally blind at baseline, can only see 20%    Sensory Examination   Sensory Comments L sided numbness/tingling    Pain Assessment   Patient Currently in Pain No   Integumentary/Edema   Integumentary/Edema no deficits were identifed   Posture   Posture not impaired   Range of Motion (ROM)   ROM Quick Adds No deficits were identified   Strength   Manual Muscle Testing Quick Adds No deficits were identified   Muscle Tone Assessment   Muscle Tone Quick Adds No deficits were identified   Coordination   Upper Extremity Coordination Left UE impaired   Mobility   Bed Mobility Bed mobility skill: Supine to sit;Bed mobility skill: Sit to supine   Bed Mobility Skill: Sit to Supine   Level of [place]: Sit/Supine independent   Bed Mobility Skill: Supine to Sit   Level of [place]: Supine/Sit independent   Transfer Skill: Sit to Stand   Level of [place]: Sit/Stand contact guard   Transfer Skill: Toilet Transfer   Level of [place]: Toilet stand-by assist   Assistive Device standard walker   Upper Body Dressing   Level of [place]: Dress Upper Body independent   Lower Body Dressing   Level of [place]: Dress Lower Body independent   Toileting   Level of [place]: Toilet independent   Grooming   Level of [place]: Grooming independent   Eating/Self Feeding   Level of [place]: Eating independent   Instrumental Activities of Daily Living (IADL)   IADL Comments spouse A with IADL's at baseline   Activities of Daily Living Analysis   Impairments Contributing to Impaired Activities of Daily Living balance impaired;coordination impaired   General Therapy Interventions   Planned Therapy Interventions ADL retraining;cognition;fine motor coordination training;strengthening;transfer training   Clinical Impression   Criteria for Skilled Therapeutic Interventions Met yes, treatment indicated   OT Diagnosis dec IND with ADl's and transfers   Influenced by the following  "impairments LUE incoordination and dec strength    Assessment of Occupational Performance 1-3 Performance Deficits   Identified Performance Deficits eating, BUE functional tasks- UE dressing, toilet transfer    Clinical Decision Making (Complexity) Low complexity   Therapy Frequency Daily   Predicted Duration of Therapy Intervention (days/wks) 2 days   Anticipated Discharge Disposition Home with Outpatient Therapy;Home with Assist   Risks and Benefits of Treatment have been explained. Yes   Patient, Family & other staff in agreement with plan of care Yes   [facility] TM \"6 Clicks\"   2016, Trustees of [facility], under license to [employer].  All rights reserved.   6 Clicks Short Forms Daily Activity Inpatient Short Form   [facility]  \"6 Clicks\" Daily Activity Inpatient Short Form   1. Putting on and taking off regular lower body clothing? 4 - None   2. Bathing (including washing, rinsing, drying)? 3 - A Little   3. Toileting, which includes using toilet, bedpan or urinal? 4 - None   4. Putting on and taking off regular upper body clothing? 4 - None   5. Taking care of personal grooming such as brushing teeth? 4 - None   6. Eating meals? 3 - A Little   Daily Activity Raw Score (Score out of 24.Lower scores equate to lower levels of function) 22   Total Evaluation Time   Total Evaluation Time (Minutes) 8     "

## 2019-09-25 NOTE — PROGRESS NOTES
Waiting for MRI to result. Holding BP med until neuro rounds to see if they want to hold BP meds per Stroke protocol.

## 2019-09-25 NOTE — PROGRESS NOTES
09/25/19 0957   General Information   Onset Date 09/24/19   Start of Care Date 09/25/19   Referring Physician Dr. Darby   Patient Profile Review/OT: Additional Occupational Profile Info See Profile for full history and prior level of function   Patient/Family Goals Statement He would like a cup of coffee.   Swallowing Evaluation Bedside swallow evaluation   Behaviorial Observations Alert   Mode of current nutrition NPO   Respiratory Status Room air   Comments Per MD note: Evening of presentation with onset of L finger numbness/ tingling, dysphagia, slurred speech. Some balance issues with EMS. In ED CT head without bleed but with evidence of vessel cutoff consistent with occlusion involving midportion of P1 segment of R PCA with no contrast seen distally. Neuro CVA involved, not tpa candidate and no IR intervention (Dr. Gonzalez). Troponin negative. EKG normal   Clinical Swallow Evaluation   Oral Musculature anomalies present   Structural Abnormalities none present   Dentition present and adequate   Mucosal Quality adequate   Mandibular Strength and Mobility intact   Oral Labial Strength and Mobility impaired pursing   Lingual Strength and Mobility impaired protrusion;impaired anterior elevation  (Deviates slightly)   Velar Elevation intact   Buccal Strength and Mobility intact   Laryngeal Function Cough;Throat clear;Swallow;Voicing initiated;Dry swallow palpated   Additional Documentation Yes   Swallow Eval   Feeding Assistance set up only required   Clinical Swallow Eval: Thin Liquid Texture Trial   Mode of Presentation, Thin Liquids cup;self-fed   Volume of Liquid or Food Presented 5 oz of water   Oral Phase of Swallow Premature pharyngeal entry   Pharyngeal Phase of Swallow intact   Diagnostic Statement Premature entry without Sx of aspiration on consecutive swallows.    Clinical Swallow Eval: Puree Solid Texture Trial   Mode of Presentation, Puree spoon;self-fed   Volume of Puree Presented 4 oz of pudding    Oral Phase, Puree Residue in oral cavity   Oral Residue, Puree soft palate;left anterior lateral sulci   Pharyngeal Phase, Puree impaired;reduction in laryngeal movement;repeated swallows   Diagnostic Statement Minimal to mild oral residue.    Clinical Swallow Eval: Solid Food Texture Trial   Mode of Presentation, Solid self-fed   Volume of Solid Food Presented 2 mireille crackers   Oral Phase, Solid Poor AP movement;Residue in oral cavity;Premature pharyngeal entry   Oral Residue, Solid soft palate;hard palate;left anterior lateral sulci   Pharyngeal Phase, Solid impaired;reduction in laryngeal movement;repeated swallows   Diagnostic Statement Mild to moderate oral residue. No Sx of aspiration.    Swallow Compensations   Swallow Compensations Alternate viscosity of consistencies;Pacing;Reduce amounts;Multiple swallow   Results Oral difficulties only;Suspect silent aspiration   General Therapy Interventions   Planned Therapy Interventions Dysphagia Treatment   Dysphagia treatment Modified diet education;Instruction of safe swallow strategies   Swallow Eval: Clinical Impressions   Skilled Criteria for Therapy Intervention Skilled criteria met.  Treatment indicated.   Functional Assessment Scale (FAS) 4   Treatment Diagnosis Mild to moderate oral and pharyngeal dysphagia   Diet texture recommendations Dysphagia diet level 3;Thin liquids   Recommended Feeding/Eating Techniques alternate between small bites and sips of food/liquid;check mouth frequently for oral residue/pocketing;hard swallow w/ each bite or sip;maintain upright posture during/after eating for 30 mins;no straws;small sips/bites   Therapy Frequency 5x/week   Predicted Duration of Therapy Intervention (days/wks) 1 week   Anticipated Discharge Disposition inpatient rehabilitation facility   Risks and Benefits of Treatment have been explained. Yes   Patient, family and/or staff in agreement with Plan of Care Yes   Clinical Impression Comments Patient  presents with mild to moderate oral and pharyngeal dysphagia at bedside secondary to a right CVA. Deficits include; minimal to mild dysarthria, decreased ROM/strength for pursing, elevation and lateralization. He demonstrated premature entry of thin liquids without overt Sx of aspiration on consecutive swallows. Mildly decreased lingual function for bolus control during AP propulsion of the bolus. Mild to moderate oral residue on the hard/soft palate and left lateral sulci. No overt Sx of aspiration noted during the evaluation, but has a baseline cough. Recommend: 1. DDL 3 with thin liquids. 2. Upright, no straws, small bites/sips, check for oral residue, double swallow or liquid rinse.    Total Evaluation Time   Total Evaluation Time (Minutes) 18

## 2019-09-26 ENCOUNTER — APPOINTMENT (OUTPATIENT)
Dept: OCCUPATIONAL THERAPY | Facility: CLINIC | Age: 74
DRG: 065 | End: 2019-09-26
Payer: COMMERCIAL

## 2019-09-26 ENCOUNTER — APPOINTMENT (OUTPATIENT)
Dept: SPEECH THERAPY | Facility: CLINIC | Age: 74
DRG: 065 | End: 2019-09-26
Payer: COMMERCIAL

## 2019-09-26 ENCOUNTER — APPOINTMENT (OUTPATIENT)
Dept: CARDIOLOGY | Facility: CLINIC | Age: 74
DRG: 065 | End: 2019-09-26
Attending: INTERNAL MEDICINE
Payer: COMMERCIAL

## 2019-09-26 VITALS
TEMPERATURE: 98.3 F | WEIGHT: 174.2 LBS | DIASTOLIC BLOOD PRESSURE: 81 MMHG | RESPIRATION RATE: 16 BRPM | OXYGEN SATURATION: 94 % | HEIGHT: 72 IN | HEART RATE: 66 BPM | BODY MASS INDEX: 23.6 KG/M2 | SYSTOLIC BLOOD PRESSURE: 139 MMHG

## 2019-09-26 LAB
ANION GAP SERPL CALCULATED.3IONS-SCNC: 5 MMOL/L (ref 3–14)
BUN SERPL-MCNC: 9 MG/DL (ref 7–30)
CALCIUM SERPL-MCNC: 8.2 MG/DL (ref 8.5–10.1)
CHLORIDE SERPL-SCNC: 107 MMOL/L (ref 94–109)
CO2 SERPL-SCNC: 24 MMOL/L (ref 20–32)
CREAT SERPL-MCNC: 1.14 MG/DL (ref 0.66–1.25)
GFR SERPL CREATININE-BSD FRML MDRD: 62 ML/MIN/{1.73_M2}
GLUCOSE SERPL-MCNC: 87 MG/DL (ref 70–99)
POTASSIUM SERPL-SCNC: 4.2 MMOL/L (ref 3.4–5.3)
SODIUM SERPL-SCNC: 136 MMOL/L (ref 133–144)

## 2019-09-26 PROCEDURE — 93272 ECG/REVIEW INTERPRET ONLY: CPT | Performed by: INTERNAL MEDICINE

## 2019-09-26 PROCEDURE — 93270 REMOTE 30 DAY ECG REV/REPORT: CPT

## 2019-09-26 PROCEDURE — 25800030 ZZH RX IP 258 OP 636: Performed by: INTERNAL MEDICINE

## 2019-09-26 PROCEDURE — 99239 HOSP IP/OBS DSCHRG MGMT >30: CPT | Performed by: INTERNAL MEDICINE

## 2019-09-26 PROCEDURE — 25000132 ZZH RX MED GY IP 250 OP 250 PS 637: Performed by: INTERNAL MEDICINE

## 2019-09-26 PROCEDURE — 80048 BASIC METABOLIC PNL TOTAL CA: CPT | Performed by: INTERNAL MEDICINE

## 2019-09-26 PROCEDURE — 25000132 ZZH RX MED GY IP 250 OP 250 PS 637: Performed by: PSYCHIATRY & NEUROLOGY

## 2019-09-26 PROCEDURE — 92526 ORAL FUNCTION THERAPY: CPT | Mod: GN | Performed by: SPEECH-LANGUAGE PATHOLOGIST

## 2019-09-26 PROCEDURE — 97110 THERAPEUTIC EXERCISES: CPT | Mod: GO | Performed by: OCCUPATIONAL THERAPY ASSISTANT

## 2019-09-26 PROCEDURE — 36415 COLL VENOUS BLD VENIPUNCTURE: CPT | Performed by: INTERNAL MEDICINE

## 2019-09-26 PROCEDURE — 97535 SELF CARE MNGMENT TRAINING: CPT | Mod: GO | Performed by: OCCUPATIONAL THERAPY ASSISTANT

## 2019-09-26 RX ORDER — ATORVASTATIN CALCIUM 40 MG/1
40 TABLET, FILM COATED ORAL EVERY EVENING
Qty: 30 TABLET | Refills: 0 | Status: SHIPPED | OUTPATIENT
Start: 2019-09-26

## 2019-09-26 RX ADMIN — ASPIRIN 81 MG: 81 TABLET, DELAYED RELEASE ORAL at 08:41

## 2019-09-26 RX ADMIN — SODIUM CHLORIDE: 9 INJECTION, SOLUTION INTRAVENOUS at 04:39

## 2019-09-26 RX ADMIN — UMECLIDINIUM BROMIDE AND VILANTEROL TRIFENATATE 1 PUFF: 62.5; 25 POWDER RESPIRATORY (INHALATION) at 09:08

## 2019-09-26 RX ADMIN — CLOPIDOGREL BISULFATE 75 MG: 75 TABLET ORAL at 08:41

## 2019-09-26 RX ADMIN — METOPROLOL SUCCINATE 50 MG: 50 TABLET, EXTENDED RELEASE ORAL at 08:41

## 2019-09-26 ASSESSMENT — ACTIVITIES OF DAILY LIVING (ADL)
ADLS_ACUITY_SCORE: 12

## 2019-09-26 NOTE — DISCHARGE SUMMARY
[facility]    Discharge Summary  Hospitalist    Date of Admission:  9/24/2019  Date of Discharge:  9/26/2019  Discharging Provider: Adwoa Barth  Date of Service (when I saw the patient): 09/26/19    Discharge Diagnoses     1. Multiple right-sided acute ischemic strokes  2. History of prior cerebellar CVA  3. History of TIA  4. Essential hypertension  5. COPD   6. Tobacco use disorder  7. CKD stage II-III  8. Major recurrent depressive disorder with anxiety    History of Present Illness   Please see H&P by Dr. Wallace Darby on 9/24/2019    Hospital Course   Emmanuel Clinton 73 year old male with hx of COPD with ongoing tobacco use, HTN, and prior CVA who presented on 9/24/2019 with left-sided numbness/tingling, and was found to have multiple right-sided strokes. Head CT on arrival showed no acute intracranial pathology, but CTA head/neck showed occlusion involving the P1 segment of the right PCA. Brain MRI (9/25) showed acute infarcts in the right thalamus and right hippocampus as well as infarcts in the right occipital lobe that were felt to be more subacute. TTE/bubble study (9/25) showed LVEF 60-65% with mild MR, and no evidence of interatrial shunt. A1c 5.5, TSH normal, lipid panel: Tchol 121, HDL 26, LDL 72. Stroke Neurology was closely involved in his care during this hospitalization.    - He reports persistent left-sided numbness/tingling at discharge, but this has subsided  - He will be discharged on aspirin 81 mg daily and Plavix 75 mg daily with plan to transition to aspirin 325 mg daily after 3 months  - PTA pravastatin was changed to atorvastatin 40 mg daily  - He will be discharged with an event monitor  - OP PT/OT/SLP were ordered at discharge  - Smoking cessation has been advised    Adwoa Barth MD    Significant Results and Procedures   As noted above    Pending Results   None    Code Status   Full Code       Primary Care Physician   Nicolette Carlson    Physical Exam    Temp: 98.3  F (36.8  C) Temp src: Oral BP: 139/81   Heart Rate: 73 Resp: 16 SpO2: 94 % O2 Device: None (Room air)    Vitals:    09/24/19 2325 09/25/19 0153   Weight: 79.2 kg (174 lb 9.7 oz) 79 kg (174 lb 3.2 oz)     Vital Signs with Ranges  Temp:  [97.3  F (36.3  C)-98.3  F (36.8  C)] 98.3  F (36.8  C)  Heart Rate:  [68-80] 73  Resp:  [16-18] 16  BP: (136-143)/(63-81) 139/81  SpO2:  [92 %-96 %] 94 %  I/O last 3 completed shifts:  In: 1941 [P.O.:240; I.V.:1701]  Out: 100 [Urine:100]    Constitutional: Appears comfortable, NAD  Respiratory: Breathing non-labored. Lungs CTAB - no wheezes/crackles/rhonchi  Cardiovascular: Heart RRR, no m/r/g. No pedal edema.   GI: +BS. Abd soft/NT  Skin: Warm and dry. No rash.  Musculoskeletal: Normal muscle bulk and tone  Neurologic: Alert and appropriate. [place]. 5/5 strength throughout  Psychiatric: Calm and cooperative    Discharge Disposition   Discharged to home  Condition at discharge: Satisfactory    Consultations This Hospital Stay   1. NEUROLOGY IP CONSULT  2. PHYSICAL THERAPY ADULT IP CONSULT  3. OCCUPATIONAL THERAPY ADULT IP CONSULT  4. SPEECH LANGUAGE PATH ADULT IP CONSULT  5. SWALLOW EVAL SPEECH PATH AT BEDSIDE IP CONSULT  6. SMOKING CESSATION PROGRAM IP CONSULT  7. PATIENT LEARNING CENTER IP CONSULT    Time Spent on this Encounter   I, Adwoa Barth MD, personally saw the patient today and spent 35 minutes discharging this patient.    Discharge Orders      Physical Therapy Referral      Occupational Therapy Referral      Speech Therapy Referral      Reason for your hospital stay    Right-sided stroke - your medications have been adjusted     Follow-up and recommended labs and tests     Follow up with [facility] in 4-6 weeks    Follow up with your primary care provider with 2 weeks for hospital follow up     Activity    Your activity upon discharge: activity as tolerated     Diet    Follow this diet upon discharge: Dysphagia Diet Level 3      Discharge Medications   Current Discharge Medication List      START taking these medications    Details   aspirin (ASA) 81 MG EC tablet Take 1 tablet (81 mg) by mouth daily  Qty: 30 tablet, Refills: 0    Associated Diagnoses: Acute ischemic stroke (H)      atorvastatin (LIPITOR) 40 MG tablet Take 1 tablet (40 mg) by mouth every evening  Qty: 30 tablet, Refills: 0    Associated Diagnoses: Acute ischemic stroke (H)         CONTINUE these medications which have NOT CHANGED    Details   acetaminophen (TYLENOL) 500 MG tablet Take 1,000 mg by mouth every 6 hours as needed       albuterol (PROAIR HFA/PROVENTIL HFA/VENTOLIN HFA) 108 (90 BASE) MCG/ACT Inhaler Inhale 2 puffs into the lungs every 6 hours as needed for shortness of breath / dyspnea or wheezing  Qty: 1 Inhaler, Refills: 0    Associated Diagnoses: Wheezing      BEVESPI AEROSPHERE 9-4.8 MCG/ACT oral inhaler Inhale 2 puffs into the lungs 2 times daily   Refills: 6      clopidogrel (PLAVIX) 75 MG tablet Take 75 mg by mouth daily       loratadine (CLARITIN) 10 MG tablet Take 10 mg by mouth daily as needed for allergies      metoprolol (TOPROL-XL) 100 MG 24 hr tablet Take 100 mg by mouth daily       Multiple Vitamins-Minerals (CENTRUM SILVER) per tablet Take 1 tablet by mouth daily      temazepam (RESTORIL) 15 MG capsule Take 15 mg by mouth nightly as needed for sleep      triamcinolone (KENALOG) 0.1 % cream Apply topically daily   Refills: 1      vitamin D3 (CHOLECALCIFEROL) 2000 units (50 mcg) tablet Take 2,000 Units by mouth daily      traZODone (DESYREL) 50 MG tablet Take 50 mg by mouth At Bedtime   Refills: 0         STOP taking these medications       pravastatin (PRAVACHOL) 80 MG tablet Comments:   Reason for Stopping:             Allergies   No Known Allergies  Data   Most Recent 3 CBC's:  Recent Labs   Lab Test 09/25/19  0314 09/24/19  2327 12/26/17  0820   WBC 7.5 8.0 8.0   HGB 14.1 14.3 12.2*   MCV 96 95 99    270 192      Most Recent 3  BMP's:  Recent Labs   Lab Test 09/26/19  0704 09/25/19  0314 09/24/19  2327    133 135   POTASSIUM 4.2 4.5 4.1   CHLORIDE 107 103 104   CO2 24 28 26   BUN 9 15 17   CR 1.14 1.35* 1.34*   ANIONGAP 5 2* 5   DANIEL 8.2* 8.4* 8.7   GLC 87 99 104*     Most Recent 2 LFT's:  Recent Labs   Lab Test 09/25/19  0314   AST 14   ALT 17   ALKPHOS 38*   BILITOTAL 0.4     Most Recent INR's and Anticoagulation Dosing History:  Anticoagulation Dose History     Recent Dosing and Labs Latest Ref Rng & Units 4/20/2006 4/21/2006 9/24/2019    INR 0.86 - 1.14 1.00 1.03 0.99        Most Recent 3 Troponin's:  Recent Labs   Lab Test 09/25/19  1045 09/25/19  0314 09/24/19  2327   TROPI <0.015 <0.015 <0.015     Most Recent Cholesterol Panel:  Recent Labs   Lab Test 09/25/19  0314   CHOL 121   LDL 72   HDL 26*   TRIG 117     Most Recent 6 Bacteria Isolates From Any Culture (See EPIC Reports for Culture Details):  Recent Labs   Lab Test 12/25/17  1715 12/25/17  1455   CULT No growth No growth     Most Recent TSH, T4 and A1c Labs:  Recent Labs   Lab Test 09/25/19  0314   TSH 1.54   A1C 5.5     Results for orders placed or performed during the hospital encounter of 09/24/19   CT Head w/o Contrast    Narrative    EXAM: CT HEAD W/O CONTRAST  LOCATION: [facility]  DATE/TIME: 9/25/2019 12:20 AM    INDICATION: Left-sided facial numbness along with bilateral lower extremity numbness.  COMPARISON: 04/21/2006.  CONTRAST: 50 mL of Isovue 350.  TECHNIQUE: Head and neck CT angiogram with IV contrast. Noncontrast head CT followed by axial helical CT images of the head and neck vessels obtained during the arterial phase of intravenous contrast administration. Axial 2D reconstructed images and   multiplanar 3D MIP reconstructed images of the head and neck vessels were performed by the technologist. Dose reduction techniques were used.     FINDINGS:   NONCONTRAST HEAD CT:   INTRACRANIAL CONTENTS: No intracranial hemorrhage, extraaxial collection,  or mass effect.  No CT evidence of acute infarct. There is scattered low-attenuation within the periventricular and subcortical white matter consistent with mild vessel ischemic   disease. Possible vague low attenuation medial right occipital lobe versus volume averaging.  The ventricular system, basal cisterns and the cortical sulci are consistent with diffuse volume loss.     VISUALIZED ORBITS/SINUSES/MASTOIDS: No intraorbital abnormality. No paranasal sinus mucosal disease. No middle ear or mastoid effusion.    BONES/SOFT TISSUES: No acute abnormality.    HEAD CTA:  ANTERIOR CIRCULATION: No stenosis/occlusion, aneurysm, or high flow vascular malformation. Standard [place] anatomy.    POSTERIOR CIRCULATION: There is a vessel cutoff involving the midportion of the P1 segment of the right posterior cerebral artery with no contrast visualized distally. This is best seen on thin cut axial image #228 and sagittal image #80. No other   discrete vessel cutoff, aneurysm or high flow vascular malformation is visualized. Balanced vertebral arteries supply a normal basilar artery.     DURAL VENOUS SINUSES: Expected enhancement of the major dural venous sinuses.    NECK CTA:  RIGHT CAROTID: No measurable stenosis or dissection.    LEFT CAROTID: No measurable stenosis or dissection.    VERTEBRAL ARTERIES: No focal stenosis or dissection. Balanced vertebral arteries.    AORTIC ARCH: Classic aortic arch anatomy with no significant stenosis at the origin of the great vessels.    NONVASCULAR STRUCTURES: The cervical spine is grossly unremarkable. There is pleural-parenchymal scarring involving the right lung apex.        Impression    IMPRESSION:   HEAD CT:  1.  No discrete mass lesion, hemorrhage or large acute territorial infarct by CT.   2.  Possible vague edema involving medial right occipital lobes.   3.  Diffuse age related changes.    HEAD CTA:   1.  Vessel cutoff consistent with occlusion involving midportion of  P1 segment of right posterior cerebral artery with no contrast seen distally.   2.  No other discrete vessel occlusion, aneurysm or high flow vascular malformation involving arteries of the [place].    NECK CTA:  1.  No significant stenosis in the neck vessels based on NASCET criteria.  2.  No evidence for dissection.    Head CT findings were communicated to Dr. Colon at 12:05 AM. CTA images were received at 12:35 AM. CTA images findings were reported to Dr. Colon by phone at 12:49 AM on 09/25/2019.      [Critical Result: Right posterior cerebral artery occlusion in midportion of P1 segment.]    Finding was identified on 9/25/2019 12:35 AM.     Dr. Colon was contacted by Dr. Hdz on 9/25/2019 12:55 AM and verbalized understanding of the critical result.    CT Head Perfusion w Contrast    Narrative    EXAM: CT HEAD PERFUSION WITH CONTRAST  LOCATION: [facility]  DATE/TIME: 9/25/2019 12:30 AM    INDICATION: Left-sided facial numbness and lower extremity numbness.  COMPARISON: CTA 09/24/2019.  TECHNIQUE: CT cerebral perfusion was performed utilizing a second contrast bolus. Perfusion data were post processed with generation of standard perfusion maps and estimation of ischemic/infarcted volumes utilizing standard threshold values. Dose   reduction techniques were used.  All stenosis measurements made according to NASCET criteria unless otherwise specified.  CONTRAST: 50mL Isovue-370    FINDINGS:    CT PERFUSION:  PERFUSION MAPS: Fairly symmetrical perfusion on the cerebral blood flow, cerebral blood volume and time to peak map. This is a possible slight delay on the time to max on image #54 in the posterior medial right temporal to right occipital lobes.    RAPID ANALYSIS:  CBF<30%: 0 ml.  Tmax>6sec: 3 58 mL  Mismatch volume: 3 58 mL  Mismatch ratio: Infinite.      Impression    IMPRESSION:   CT PERFUSION:  1.  No significant mismatch ratio or volume seen on the perfusion imaging.        MRI Brain w & w/o contrast    Narrative    MRI BRAIN WITHOUT AND WITH CONTRAST September 25, 2019 9:19 AM    HISTORY: Stroke with new onset of left finger numbness, tingling,  slurred speech and dysphagia. Balance issues. Occluded right posterior  cerebral artery.     TECHNIQUE: Multiplanar, multisequence MRI of the brain without and  with 8 mL IV Gadavist.    COMPARISON: CT angiogram yesterday. MRI 4/21/2006.    FINDINGS: Acute infarcts are seen in the right thalamus medially and  laterally, right temporal lobe in the hippocampal formation, and  medially and inferiorly in the right occipital lobe cortex and white  matter. No infarcts are seen in the left cerebral hemisphere,  brainstem or cerebellum. There is generalized atrophy of the brain.  White matter changes are present in the cerebral hemispheres that are  consistent with small vessel ischemic disease in this age patient.  There is no evidence of hemorrhage.  Abnormal gadolinium enhancement  is seen in the right occipital lobe infarcts but not in the thalamic  or temporal lobe infarcts, so these may be subacute.      The facial structures appear normal. The arteries at the base of the  brain and the dural venous sinuses appear patent.       Impression    IMPRESSION:    1. Infarcts in the right occipital lobe that may be subacute. Acute  infarcts in the right thalamus and the right hippocampal formation.    2. Brain atrophy and white matter changes consistent with sequelae of  small vessel ischemic disease.    PAULINA ORTEGA MD

## 2019-09-26 NOTE — PLAN OF CARE
Pt here with Right Acute ischemic stroke. A&Ox4. Assist of 1 with GB and walker. Tolerating DD3 diet with thin liquids. No straws. Continent of bowel and bladder. Neuros intact ex; hemiparesis LUE, LUE ataxia, L drift, ataxic gait. N/t in left face, arm, leg. 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 Tele: NSR. LS, wheezes present. BS active. Rash on Left lower leg. Denies pain. Plan: discharge 9/26/19.

## 2019-09-26 NOTE — DISCHARGE INSTRUCTIONS
The plan is to continue baby aspirin (81 mg) and Plavix for 3 months and then switch to full dose aspirin (325) mg daily

## 2019-09-26 NOTE — PLAN OF CARE
Discharge Planner OT   Patient plan for discharge: return home      Current status:  pt in bathroom upon OT arrival, CGA for safety with amb from bathroom as pt pushing IV pole and declined use of FWW for short distance, educated on importance for safety and using FWW during functional activities, pt agreeable and stated he will use his walker at home which pt stated he has a 4WW. Pt able to complete dressing independently, educated on and completed FMC/strengthening exercises for LUE with blue sponge and red theraputty to progress independence with completing ADLS as pt with decrease FMC/strength. Spouse will assist with reading handout as pt with decrease vision and not able to read handout.      Barriers to return to prior living situation: impaired balance, mildly impulsive      Recommendations for discharge: home with spouse A for cooking, cleaning, laundry, with use of FWW due to unsteadiness. OP OT for LUE coordination and strengthening per plan established by the Occupational THerapist     Rationale for recommendations: pt will benefit from OP OT for LUE coordination and strengthening exercises as limiting for ADL function. Spouse will be home to A as needed at discharge.       Entered by: Becki Castano 09/26/2019 11:22 AM      Pt to discharge home with spouse to assist as needed for ADLS and to follow up with OP OT for LUE coordination/strengthening. GOALS PARTIALLY MET, see discharge summary

## 2019-09-26 NOTE — PLAN OF CARE
Patient is A/O x4. SBA. VSS, RA. Denies pain. Neuros intact ex slight L facial droop, and N/T in LUE and LLE. DD3 diet with thin liquids, no straws. Tolerates well. Patient cleared for discharge home this afternoon. NIHSS done. Stroke teaching done. Cardiac event monitor being placed. Discharge instructions and medications reviewed with patient and wife. Both verbalized understanding of medications and instructions/follow-ups. Stable at time of discharge.

## 2019-09-26 NOTE — PLAN OF CARE
Discharge Planner SLP   Patient plan for discharge: Home  Current status: Patient was seen for swallow treatment at bedside with his wife present. He was given a snack of toast and coffee. He demonstrated improved mastication, bolus manipulation and oral clearing from the left side. Tolerated thin liquids via the cup without overt Sx of aspiration. Education on taking smaller bite sizes, otherwise implementing swallow strategies.   Recommend:1. Advance to regular textures and thin liquids. 2. Upright, no straws, small bites/sips, and check for oral residue. Alternate liquids/solids as needed.     Barriers to return to prior living situation: None  Recommendations for discharge: Home with OP ST.  Rationale for recommendations: Home with OP speech to complete a speech/language evaluation. Ensure diet tolerance of regular textures.     Speech Language Therapy Discharge Summary    Reason for therapy discharge:    Discharged to home with outpatient therapy.    Progress towards therapy goal(s). See goals on Care Plan in [facility] electronic health record for goal details.  Goals partially met.  Barriers to achieving goals:   discharge from facility.    Therapy recommendation(s):    Continued therapy is recommended.  Rationale/Recommendations:  OP ST to complete a full speech/language evaluation..           Entered by: Erica Sargent 09/26/2019 1:45 PM

## 2019-12-11 ENCOUNTER — TELEPHONE (OUTPATIENT)
Dept: MEDSURG UNIT | Facility: CLINIC | Age: 74
End: 2019-12-11

## 2020-08-27 NOTE — PROVIDER NOTIFICATION
"Paged Dr. GRIDER, \"MRI results in. PTA metoprolol ordered for this AM. Do you want me to hold BP meds for now for perfusion? \"  " less than 2 seconds

## 2021-05-13 NOTE — ED NOTES
Bed: ST01  Expected date: 9/24/19  Expected time: 11:20 PM  Means of arrival: Ambulance  Comments:  Em 531 73M stroke   Patient Education     Viral Upper Respiratory Illness (Adult)  You have a viral upper respiratory illness (URI), which is another term for the common cold. This illness is contagious during the first few days. It is spread through the air by coughing and sneezing. It may also be spread by direct contact (touching the sick person and then touching your own eyes, nose, or mouth). Frequent handwashing will decrease risk of spread. Most viral illnesses go away within 7 to 10 days with rest and simple home remedies. Sometimes the illness may last for several weeks. Antibiotics will not kill a virus, and they are generally not prescribed for this condition.    Home care  · If symptoms are severe, rest at home for the first 2 to 3 days. When you resume activity, don't let yourself get too tired.  · Avoid being exposed to cigarette smoke (yours or others’).  · You may use acetaminophen or ibuprofen to control pain and fever, unless another medicine was prescribed. If you have chronic liver or kidney disease, have ever had a stomach ulcer or gastrointestinal bleeding, or are taking blood-thinning medicines, talk with your healthcare provider before using these medicines. Aspirin should never be given to anyone under 18 years of age who is ill with a viral infection or fever. It may cause severe liver or brain damage.  · Your appetite may be poor, so a light diet is fine. Avoid dehydration by drinking 6 to 8 glasses of fluids per day (water, soft drinks, juices, tea, or soup). Extra fluids will help loosen secretions in the nose and lungs.  · Over-the-counter cold medicines will not shorten the length of time you’re sick, but they may be helpful for the following symptoms: cough, sore throat, and nasal and sinus congestion. (Note: Do not use decongestants if you have high blood pressure.)  Follow-up care  Follow up with your healthcare provider, or as advised.  When to seek medical advice  Call your healthcare provider right  away if any of these occur:  · Cough with lots of colored sputum (mucus)  · Severe headache; face, neck, or ear pain  · Difficulty swallowing due to throat pain  · Fever of 100.4°F (38°C) or higher, or as directed by your healthcare provider  Call 911  Call 911 if any of these occur:  · Chest pain, shortness of breath, wheezing, or difficulty breathing  · Coughing up blood  · Inability to swallow due to throat pain  Date Last Reviewed: 9/13/2015 © 2000-2018 [employer]. [address] [place] [id number]. All rights reserved. This information is not intended as a substitute for professional medical care. Always follow your healthcare professional's instructions.

## 2022-05-17 ENCOUNTER — OFFICE VISIT (OUTPATIENT)
Dept: OPHTHALMOLOGY | Facility: CLINIC | Age: 77
End: 2022-05-17
Attending: OPHTHALMOLOGY
Payer: COMMERCIAL

## 2022-05-17 DIAGNOSIS — H53.40 VISUAL FIELD DEFECT: ICD-10-CM

## 2022-05-17 DIAGNOSIS — H04.123 DRY EYE SYNDROME OF BOTH EYES: Primary | ICD-10-CM

## 2022-05-17 DIAGNOSIS — H47.20 OPTIC ATROPHY: ICD-10-CM

## 2022-05-17 DIAGNOSIS — H53.40 VISUAL FIELD DEFECT: Primary | ICD-10-CM

## 2022-05-17 PROCEDURE — 92083 EXTENDED VISUAL FIELD XM: CPT | Performed by: OPHTHALMOLOGY

## 2022-05-17 PROCEDURE — 92133 CPTRZD OPH DX IMG PST SGM ON: CPT | Performed by: OPHTHALMOLOGY

## 2022-05-17 PROCEDURE — 92083 EXTENDED VISUAL FIELD XM: CPT | Mod: 26 | Performed by: INTERNAL MEDICINE

## 2022-05-17 PROCEDURE — 92014 COMPRE OPH EXAM EST PT 1/>: CPT | Mod: GC | Performed by: INTERNAL MEDICINE

## 2022-05-17 PROCEDURE — G0463 HOSPITAL OUTPT CLINIC VISIT: HCPCS

## 2022-05-17 PROCEDURE — 92133 CPTRZD OPH DX IMG PST SGM ON: CPT | Mod: 26 | Performed by: INTERNAL MEDICINE

## 2022-05-17 RX ORDER — TIOTROPIUM BROMIDE 18 UG/1
CAPSULE ORAL; RESPIRATORY (INHALATION)
COMMUNITY
Start: 2022-04-20 | End: 2023-03-27

## 2022-05-17 ASSESSMENT — CONF VISUAL FIELD
OD_INFERIOR_TEMPORAL_RESTRICTION: 3
OS_INFERIOR_NASAL_RESTRICTION: 3
OS_INFERIOR_TEMPORAL_RESTRICTION: 3
OD_INFERIOR_NASAL_RESTRICTION: 3
OS_SUPERIOR_NASAL_RESTRICTION: 3

## 2022-05-17 ASSESSMENT — VISUAL ACUITY
OD_SC: 20/400
METHOD: SNELLEN - LINEAR
OS_SC: CF @ 1'

## 2022-05-17 ASSESSMENT — TONOMETRY
OD_IOP_MMHG: 12
IOP_METHOD: ICARE
OS_IOP_MMHG: 9

## 2022-05-17 ASSESSMENT — SLIT LAMP EXAM - LIDS
COMMENTS: NORMAL
COMMENTS: NORMAL

## 2022-05-17 ASSESSMENT — EXTERNAL EXAM - RIGHT EYE: OD_EXAM: NORMAL

## 2022-05-17 ASSESSMENT — EXTERNAL EXAM - LEFT EYE: OS_EXAM: NORMAL

## 2022-05-17 NOTE — PATIENT INSTRUCTIONS
Low vision organizations      1. [facility] (SSB) offers help with vocational rehabilitation to help train and educate to obtain or maintain employment.  They also help seniors remain independent in their home and community.  Finally, they offer Braille and recorded materials.  Please let your doctor know if you would like a referral. They have offices in [place], [place], [place], [place], [place], [place], [place], [facility], and [facility].  Email: [email or web address]., Phone: [phone]. Website: [place]deed[place]ssb/.      2. [employer] for [employer] serves [facility] and [facility].  They help patients with vision loss with coaching, training, and occupational therapy in their center, at home, or at the workplace. They have a special interest in using technology to help those with vision loss.  Email: info@[employer].org. Phone: [phone]. Website: [employer]oc[employer].     3. [employer] Resources provides training and classes for patients and their families, social and emotional support, and resources for those with vision loss in the [facility]. They have training for older adults, working age adults, and deaf/blind individuals.  Email: info@[email or web address]. Phone: [phone]--[phone]. Website: [employer]cl[employer].org.      4. [employer] seeks to provide adjustment to blindness training.  They serve blind people of all ages and all degrees of blindness. They offer Braille, cane travel, home and personal management, and career development to those with vision loss.  Email: info@blindinc.org. Phone: [phone]. Website: [employer].Associa.     5. [employer] provides braille related services and products. They have a library of 1500 braille titles and have transcription and  training.  Email: [email or web address]. Phone: [phone]. Website: [facility].Associa.       Low vision resources    1. Dr. Hang Merlos is a low  vision optometrist at the [facility] who specializes in prescribing glasses to those with reduced vision.  To make an appointment, please call [phone].      2.  The [facility] has an occupational therapist specializing in low vision services. These services will be billed to your insurance.  If you would like a referral, please let your doctor know.      3. The [employer] is a brick and mortar location in [place] with online ordering available.  They carry magnifiers, watches, daily life aids, games, canes, and other daily life products for those with low vision.  To contact them electronically: [email or web address]. Phone: [phone]. Website: [employer].     4. [employer]: [employer]/help/customer/accessibility    5. [employer]: [phone]) [phone]    6. [employer] Accessibility Support [phone]    7. [employer] Accessibility Answer Desk  [phone].     8. [employer] Disability Support     To request a call back:  [email or web address]  To chat online: [email or web address]  To email: [email or web address]    9. [facility] mobility: [phone]        ----      You have been diagnosed with Dry eye syndrome.  Symptoms of Dry eye syndrome can include double vision, eye pain, blurry vision, stinging, burning, tearing, eye redness.      Some useful instructions are listed below:     1.  Use artificial tears on a regular basis.  If you use artificial tear drops with preservative, you can use them up to 4-6 times per day. If you use artificial tear drops without preservatives, then you can use them more often.      2.  Wash your eyelashes with hot water.  Do not make the water so hot that you burn yourself, but the water should be more than just warm.  Often patients will wash their eyelashes in the shower under the hot water.   You should rub gently along the base of your eyelashes.      3.  Taking fish oil capsules twice a day for a month and then once a day after that can help improve Dry eye symptoms.      4.  Drink a lot of water.  Hydration can be helpful.      5.  Humidify your environment.      6.  If this is not beneficial, other treatments can include punctal plugs or cautery, corticosteroid eye drops, Restasis, Lipiflow, or autologous serum.  If you are still struggling with dry eye symptoms, call Dr. Carpenter's office for other therapies or a referral to a dry eye specialist.

## 2022-05-17 NOTE — NURSING NOTE
"Chief Complaints and History of Present Illnesses   Patient presents with     Follow Up     Chief Complaint(s) and History of Present Illness(es)     Follow Up               Comments     Pt states that both of his eyes feel swollen, watery, and that there is intermittent pain in his eyes that he feels every day at different points. Pt reports that on the outside of his vision in the left eye, he sees a \"spider web\". He says that these issues commenced about 3 weeks ago and that there has been no improvement or worsening of the problems. He says that he went and bought ketotifen fumarate 0.0035% for the problems and took that for about 2 weeks- pt describes that they helped soothe the eyes, but ultimately did not solve the issue.  Pt has a possible cyst by left eye that he wants looked at.    Chase Orr OT 1:52 PM May 17, 2022                "

## 2022-05-17 NOTE — PROGRESS NOTES
"       Assessment & Plan     Emmanuel Clinton is a 76 year old male with the following diagnoses:   1. Dry eye syndrome of both eyes    2. Visual field defect    3. Optic atrophy         Last visit 9/9/19. Had second opinion at [place] with Dr. Schultz who agreed with diagnosis of sequential AION.     He has some shuffling but they deny REM behavior disorder. Balance \"iffy\" but has not fallen. No orthostatic hypotension. No hypophonia.     He reports that around three weeks ago he had worsening vision in the left>right eyes with associated redness and tearing. At times vision can be at baseline but fluctuates significantly.    Exam:  Visual acuity without correction was 20/400 in the right eye and CF at 1' in the left eye. IOP was 12 in the right eye and 9 in the left eye. Visual fields were full in both eyes. Ocular motility was full in all gaze directions. Color plates were 11/11 in the right eye and 11/11 in the left eye.  Pupils were equal, round and reactive to light with no RAPD.     VERY slow blink rate. Square wave jerks.     Strabismus exam: full ortho. Normal smooth pursuit  Anterior segment exam: PEE each eye L>R  Dilated fundus exam: Nerve Pallor OU    Tests ordered and interpreted today:  OCT rNFL demonstrated a mean NFL thickness of 84  in the right eye and 71 in the left eye. Thickness profile demonstrated thinning in the right eye and thinning in the left eye.  VF: Octopus 30 degree automated static perimetry visual field was performed. Test was reliable..     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 There is mild increase in thickness today that may be because of poor scan reliability previously.     Acute changes in vision for the last three weeks are likely secondary to dry eye. He also has 2+ in axis PCO in the right eye which may be affecting OCT quality.           Attending Physician Attestation:  Complete documentation of historical and exam elements from today's encounter " can be found in the full encounter summary report (not reduplicated in this progress note).  I personally obtained the chief complaint(s) and history of present illness.  I confirmed and edited as necessary the review of systems, past medical/surgical history, family history, social history, and examination findings as documented by others; and I examined the patient myself.  I personally reviewed the relevant tests, images, and reports as documented above.  I formulated and edited as necessary the assessment and plan and discussed the findings and management plan with the patient and family. I personally reviewed the ophthalmic test(s) associated with this encounter, agree with the interpretation(s) as documented by the resident/fellow, and have edited the corresponding report(s) as necessary.  - Dayron Hodge, DO   PGY-5 Neuro-Ophthalmology Fellow

## 2022-06-08 ENCOUNTER — ANCILLARY PROCEDURE (OUTPATIENT)
Dept: CT IMAGING | Facility: CLINIC | Age: 77
End: 2022-06-08
Attending: THORACIC SURGERY (CARDIOTHORACIC VASCULAR SURGERY)
Payer: COMMERCIAL

## 2022-06-08 DIAGNOSIS — R91.8 OTHER NONSPECIFIC ABNORMAL FINDING OF LUNG FIELD: ICD-10-CM

## 2022-06-08 PROCEDURE — 71250 CT THORAX DX C-: CPT

## 2022-07-20 ENCOUNTER — ANCILLARY PROCEDURE (OUTPATIENT)
Dept: CT IMAGING | Facility: CLINIC | Age: 77
End: 2022-07-20
Attending: THORACIC SURGERY (CARDIOTHORACIC VASCULAR SURGERY)
Payer: COMMERCIAL

## 2022-07-20 DIAGNOSIS — J93.9 PNEUMOTHORAX, UNSPECIFIED TYPE: ICD-10-CM

## 2022-07-20 DIAGNOSIS — R91.8 LUNG MASS: ICD-10-CM

## 2022-07-20 PROCEDURE — 71250 CT THORAX DX C-: CPT

## 2023-03-27 ENCOUNTER — OFFICE VISIT (OUTPATIENT)
Dept: OPHTHALMOLOGY | Facility: CLINIC | Age: 78
End: 2023-03-27
Attending: OPHTHALMOLOGY
Payer: COMMERCIAL

## 2023-03-27 DIAGNOSIS — H47.20 OPTIC ATROPHY: Primary | ICD-10-CM

## 2023-03-27 DIAGNOSIS — H53.40 VISUAL FIELD DEFECT: ICD-10-CM

## 2023-03-27 DIAGNOSIS — H53.40 VISUAL FIELD DEFECT: Primary | ICD-10-CM

## 2023-03-27 PROCEDURE — G0463 HOSPITAL OUTPT CLINIC VISIT: HCPCS | Performed by: OPHTHALMOLOGY

## 2023-03-27 PROCEDURE — 92083 EXTENDED VISUAL FIELD XM: CPT | Performed by: OPHTHALMOLOGY

## 2023-03-27 PROCEDURE — 92133 CPTRZD OPH DX IMG PST SGM ON: CPT | Performed by: OPHTHALMOLOGY

## 2023-03-27 PROCEDURE — 92014 COMPRE OPH EXAM EST PT 1/>: CPT | Mod: GC | Performed by: OPHTHALMOLOGY

## 2023-03-27 RX ORDER — GABAPENTIN 100 MG/1
100 CAPSULE ORAL 3 TIMES DAILY
COMMUNITY
Start: 2022-11-08

## 2023-03-27 RX ORDER — UMECLIDINIUM BROMIDE AND VILANTEROL TRIFENATATE 62.5; 25 UG/1; UG/1
1 POWDER RESPIRATORY (INHALATION) DAILY
COMMUNITY
Start: 2023-03-23

## 2023-03-27 ASSESSMENT — TONOMETRY
OD_IOP_MMHG: 16
IOP_METHOD: TONOPEN
OS_IOP_MMHG: 17

## 2023-03-27 ASSESSMENT — CONF VISUAL FIELD
OD_INFERIOR_NASAL_RESTRICTION: 3
OD_SUPERIOR_NASAL_RESTRICTION: 3
OS_INFERIOR_TEMPORAL_RESTRICTION: 3
OD_SUPERIOR_TEMPORAL_RESTRICTION: 3
OS_SUPERIOR_NASAL_RESTRICTION: 1
OD_INFERIOR_TEMPORAL_RESTRICTION: 3
OS_SUPERIOR_TEMPORAL_RESTRICTION: 1
OS_INFERIOR_NASAL_RESTRICTION: 3

## 2023-03-27 ASSESSMENT — VISUAL ACUITY
OS_SC: CF 1'
METHOD: SNELLEN - LINEAR
OD_SC+: -1
OD_SC: 20/125

## 2023-03-27 NOTE — PATIENT INSTRUCTIONS
"What is dry eye?    We use the term \"dry eye\" as a shorthand for irritation of the ocular surface and eyelids. While poor lubrication is often a trigger for this irritation, patients will not always experience a dry sensation specifically and aching or burning can occur instead.  Patients with dry eye can also have significant blurry vision and some forms of double vision, much like dirt or scratches on a camera lens would ruin a picture. Some patients will develop symptoms in both eyes, while others may have symptoms that predominate in one eye.     In many cases, patients will still produce tears and may even experience excessive tearing. These tears, which are very watery, are often a reaction to eye irritation. They evaporate quickly and do not effectively lubricate the eye.     Adequte lubrication and protection of the eye actually requires an oily substance produced within the eyelids, which mixes with watery tears to reduce evaporation and protect the surface of the eye. It is very common for these tiny oil glands to become \"blocked-up,\" which not only causes them to become inflamed, but deprives the surface of the eye of these very important oily secretions.          Treatment of this condition is the purview of a general ophthalmologist or optometrist, but several interventions can be helpful for both the comfort of the eyes and the clarity of the vision.    Artificial tears are a simple yet commonly effective treatment for dry eyes. Any brand is fine, as long as there are no added ingredients for redness (e.g., \"Visine\" or \"get-the-read-out\" drops) or allergies. You should aim to use artificial tears at least two or three times a day, especially before reading or computer use since we unconsciously blink less during visually intensive tasks.     Patients will rarely develop sensitivity to the preservatives in conventional artificial tears, so you may wish to try a preservative-free alternative to see if " "your symptoms improve. These are preferred when patients use tears more than four times a day and can be used as often as you wish.    If you experience discomfort overnight or upon awakening, you may benefit from a nighttime lubricant to protect the eyes while you sleep. This may be especially important if you have sleep apnea or wear a CPAP mask, which blows air into the eyes. You may use a nighttime eye gel or an ointment, both available over the counter under many different brand names. The ointment tends to be more lubricating and protective, but small amounts will linger after you wake up and can cause mild blurry vision.    A warm compress can help the oil glands in your eyelids release their contents, which reduces inflammation of the glands and delivers the oil to the surface of the eye for better lubrication. It can also be very soothing to the eyes. To do this, run a clean washcloth under very warm water, test against your forearm to make sure it is not uncomfortably hot, and then wring it out before resting on top of the eyes for five minutes. Doing this twice a day is safe and often very helpful.    Avoid rubbing your eyes or rinsing the eyes with tap water, as these can both cause significant irritation and drive a \"vicious cycle\" of increased rubbing.     If you have bothersome tearing, use caution with tissues since these can be very irritating if applied to the eyes frequently. Tissues are actually quite abrasive since they are made from wood pulp. Think about how the skin around the nose becomes chapped when you have a cold -- this is much thicker skin than the eyelids! If you must dab tears, keep the tissue away from the eyelids.                            [employer] makes \"TV Magnifying Glasses\" and \"Max TV Clip on Magnifying glasses\". They can be be found on Amazon as well as other online vendors.  They come in 2.1X magnification only.          "

## 2023-03-27 NOTE — NURSING NOTE
Chief Complaints and History of Present Illnesses   Patient presents with     Visual Field Defect Follow Up     Chief Complaint(s) and History of Present Illness(es)     Visual Field Defect Follow Up            Laterality: both eyes    Associated symptoms: Negative for dryness, eye pain, tearing, photophobia, flashes, floaters and itching    Pain scale: 0/10          Comments    Oliver is here for his annual follow up of Visual field defect both eyes. He feels vision is about the same as last visit.    Goran Cox COT 1:54 PM March 27, 2023

## 2023-03-27 NOTE — PROGRESS NOTES
Emmanuel Clinton is a 77 year old male with the following diagnoses:   1. Optic atrophy    2. 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 At that time, his exam was stable. We prescribed Xiidra for dryness/discomfort.     Since then he feels his vision is stable. He is very pleased with the Xiidra. He has  Not undergone a sleep study. He does take metoprolol for blood pressure and believes he takes this in the morning. No use of PDE5 inhibitors.        Exam:  Uncorrected distance visual acuity was 20/125 -1 in the right eye and CF 1' in the left eye. Intraocular pressure was 16 in the right eye and 17 in the left eye using Tonopen.  Color vision 0/11 right eye and 0/11 left eye.  Pupils without RAPD (previously documented).  Anterior segment exam with marked SPEE OU.  Fundus exam with stable optic disc pallor, otherwise unremarkable.      Low Vision Central OU    Stable inferior altitudinal OD>OS     OCT Optic Nerve RNFL Spectralis OU (both eyes)    OD: Possibly progressed temporal atrophy, average 66 (83 in 5/2022, 75 in 9/2019)  OS: Stable temporal atrophy, average 66 from 71 (5/2022)    It is my impression that patient has essentially stable bilateral optic atrophy from NAION. He also has stigmata of dry eye. We discussed artificial tears, nighttime lubrication, and other measures to supplement the Xiidra, which he has found very helpful.    I am always happy to see Emmanuel back for new concerns but I did not make a follow up appointment for him today.  We discussed return precautions and modes of contacting our service for changing symptoms.              Attending Physician Attestation:  Complete documentation of historical and exam elements from today's encounter can be found in the full encounter summary report (not reduplicated in this progress note).  I personally obtained the chief complaint(s) and history of present illness.  I confirmed and  edited as necessary the review of systems, past medical/surgical history, family history, social history, and examination findings as documented by others; and I examined the patient myself.  I personally reviewed the relevant tests, images, and reports as documented above.  I formulated and edited as necessary the assessment and plan and discussed the findings and management plan with the patient and family. I personally reviewed the ophthalmic test(s) associated with this encounter, agree with the interpretation(s) as documented by the resident/fellow, and have edited the corresponding report(s) as necessary.  - Dayron Senior MD PhD  Fellow, Neuro-Ophthalmology

## 2023-08-02 ENCOUNTER — TRANSFERRED RECORDS (OUTPATIENT)
Dept: HEALTH INFORMATION MANAGEMENT | Facility: CLINIC | Age: 78
End: 2023-08-02

## 2023-08-02 ENCOUNTER — ANCILLARY PROCEDURE (OUTPATIENT)
Dept: CT IMAGING | Facility: CLINIC | Age: 78
End: 2023-08-02
Attending: THORACIC SURGERY (CARDIOTHORACIC VASCULAR SURGERY)
Payer: COMMERCIAL

## 2023-08-02 DIAGNOSIS — R91.8 OTHER NONSPECIFIC ABNORMAL FINDING OF LUNG FIELD: ICD-10-CM

## 2023-08-02 PROCEDURE — 71250 CT THORAX DX C-: CPT

## 2023-09-09 DIAGNOSIS — H04.123 DRY EYE SYNDROME OF BOTH EYES: ICD-10-CM

## 2023-09-09 DIAGNOSIS — H53.40 VISUAL FIELD DEFECT: ICD-10-CM

## 2023-09-12 NOTE — TELEPHONE ENCOUNTER
lifitegrast (XIIDRA) 5 % opthalmic solution   8 each 11 5/17/2022     3/27/2023  [facility]      Dayron Carpenter MD  Ophthalmology       Routed because :not on protocol

## 2023-09-13 DIAGNOSIS — H53.40 VISUAL FIELD DEFECT: ICD-10-CM

## 2023-09-13 DIAGNOSIS — H04.123 DRY EYE SYNDROME OF BOTH EYES: ICD-10-CM

## 2023-09-13 RX ORDER — LIFITEGRAST 50 MG/ML
1 SOLUTION/ DROPS OPHTHALMIC 2 TIMES DAILY
OUTPATIENT
Start: 2023-09-13

## 2023-09-13 RX ORDER — LIFITEGRAST 50 MG/ML
1 SOLUTION/ DROPS OPHTHALMIC 2 TIMES DAILY
Qty: 60 EACH | Refills: 0 | Status: SHIPPED | OUTPATIENT
Start: 2023-09-13 | End: 2024-03-07

## 2023-09-13 NOTE — TELEPHONE ENCOUNTER
lifitegrast (XIIDRA) 5 % opthalmic solution 60 each 0 9/13/2023   Addressed in a different encounter.

## 2023-12-27 ENCOUNTER — TRANSCRIBE ORDERS (OUTPATIENT)
Dept: OTHER | Age: 78
End: 2023-12-27

## 2023-12-27 DIAGNOSIS — J44.9 COPD, MODERATE (H): Primary | ICD-10-CM

## 2024-01-04 ENCOUNTER — HOSPITAL ENCOUNTER (OUTPATIENT)
Dept: CARDIAC REHAB | Facility: CLINIC | Age: 79
Discharge: HOME | End: 2024-01-04
Attending: INTERNAL MEDICINE
Payer: COMMERCIAL

## 2024-01-04 DIAGNOSIS — J44.9 COPD, MODERATE (H): ICD-10-CM

## 2024-01-04 PROCEDURE — 94626 PHY/QHP OP PULM RHB W/MNTR: CPT

## 2024-01-18 ENCOUNTER — HOSPITAL ENCOUNTER (OUTPATIENT)
Dept: CARDIAC REHAB | Facility: CLINIC | Age: 79
Discharge: HOME | End: 2024-01-18
Attending: INTERNAL MEDICINE
Payer: COMMERCIAL

## 2024-01-18 PROCEDURE — 94625 PHY/QHP OP PULM RHB W/O MNTR: CPT

## 2024-01-23 ENCOUNTER — HOSPITAL ENCOUNTER (OUTPATIENT)
Dept: CARDIAC REHAB | Facility: CLINIC | Age: 79
Discharge: HOME | End: 2024-01-23
Attending: INTERNAL MEDICINE
Payer: COMMERCIAL

## 2024-01-23 PROCEDURE — 94625 PHY/QHP OP PULM RHB W/O MNTR: CPT | Performed by: REHABILITATION PRACTITIONER

## 2024-01-25 ENCOUNTER — HOSPITAL ENCOUNTER (OUTPATIENT)
Dept: CARDIAC REHAB | Facility: CLINIC | Age: 79
Discharge: HOME | End: 2024-01-25
Attending: INTERNAL MEDICINE
Payer: COMMERCIAL

## 2024-01-25 PROCEDURE — 94625 PHY/QHP OP PULM RHB W/O MNTR: CPT | Performed by: REHABILITATION PRACTITIONER

## 2024-01-30 ENCOUNTER — HOSPITAL ENCOUNTER (OUTPATIENT)
Dept: CARDIAC REHAB | Facility: CLINIC | Age: 79
Discharge: HOME | End: 2024-01-30
Attending: INTERNAL MEDICINE
Payer: COMMERCIAL

## 2024-01-30 PROCEDURE — 94625 PHY/QHP OP PULM RHB W/O MNTR: CPT

## 2024-02-08 ENCOUNTER — HOSPITAL ENCOUNTER (OUTPATIENT)
Dept: CARDIAC REHAB | Facility: CLINIC | Age: 79
Discharge: HOME | End: 2024-02-08
Attending: INTERNAL MEDICINE
Payer: COMMERCIAL

## 2024-02-08 PROCEDURE — 94625 PHY/QHP OP PULM RHB W/O MNTR: CPT | Performed by: REHABILITATION PRACTITIONER

## 2024-02-13 ENCOUNTER — HOSPITAL ENCOUNTER (OUTPATIENT)
Dept: CARDIAC REHAB | Facility: CLINIC | Age: 79
Discharge: HOME | End: 2024-02-13
Attending: INTERNAL MEDICINE
Payer: COMMERCIAL

## 2024-02-13 PROCEDURE — 94625 PHY/QHP OP PULM RHB W/O MNTR: CPT

## 2024-02-20 ENCOUNTER — HOSPITAL ENCOUNTER (OUTPATIENT)
Dept: CARDIAC REHAB | Facility: CLINIC | Age: 79
Discharge: HOME | End: 2024-02-20
Attending: INTERNAL MEDICINE
Payer: COMMERCIAL

## 2024-02-20 PROCEDURE — 94625 PHY/QHP OP PULM RHB W/O MNTR: CPT

## 2024-02-22 ENCOUNTER — HOSPITAL ENCOUNTER (OUTPATIENT)
Dept: CARDIAC REHAB | Facility: CLINIC | Age: 79
Discharge: HOME | End: 2024-02-22
Attending: INTERNAL MEDICINE
Payer: COMMERCIAL

## 2024-02-22 PROCEDURE — 94625 PHY/QHP OP PULM RHB W/O MNTR: CPT | Performed by: REHABILITATION PRACTITIONER

## 2024-02-29 ENCOUNTER — HOSPITAL ENCOUNTER (OUTPATIENT)
Dept: CARDIAC REHAB | Facility: CLINIC | Age: 79
Discharge: HOME | End: 2024-02-29
Attending: INTERNAL MEDICINE
Payer: COMMERCIAL

## 2024-02-29 PROCEDURE — 94625 PHY/QHP OP PULM RHB W/O MNTR: CPT

## 2024-03-05 ENCOUNTER — HOSPITAL ENCOUNTER (OUTPATIENT)
Dept: CARDIAC REHAB | Facility: CLINIC | Age: 79
Discharge: HOME | End: 2024-03-05
Attending: INTERNAL MEDICINE
Payer: COMMERCIAL

## 2024-03-05 PROCEDURE — 94625 PHY/QHP OP PULM RHB W/O MNTR: CPT

## 2024-03-07 ENCOUNTER — HOSPITAL ENCOUNTER (OUTPATIENT)
Dept: CARDIAC REHAB | Facility: CLINIC | Age: 79
Discharge: HOME | End: 2024-03-07
Attending: INTERNAL MEDICINE
Payer: COMMERCIAL

## 2024-03-07 DIAGNOSIS — H53.40 VISUAL FIELD DEFECT: ICD-10-CM

## 2024-03-07 DIAGNOSIS — H04.123 DRY EYE SYNDROME OF BOTH EYES: ICD-10-CM

## 2024-03-07 PROCEDURE — 94625 PHY/QHP OP PULM RHB W/O MNTR: CPT

## 2024-03-08 RX ORDER — LIFITEGRAST 50 MG/ML
1 SOLUTION/ DROPS OPHTHALMIC 2 TIMES DAILY
Qty: 60 EACH | Refills: 0 | Status: SHIPPED | OUTPATIENT
Start: 2024-03-08

## 2024-03-08 NOTE — TELEPHONE ENCOUNTER
lifitegrast (XIIDRA) 5 % opthalmic solution   Last Written Prescription Date:  9/13/2023  Last Fill Quantity: 60,   # refills: 0  Last Office Visit : 3/27/2023  Future Office visit:  None    Routing refill request to provider for review/approval because:  Second Request   Needing updated office visit  Please call Pt to schedule  Thank you     Dione Rasmussen RN  Central Triage Red Flags/Med Refills

## 2024-03-12 ENCOUNTER — HOSPITAL ENCOUNTER (OUTPATIENT)
Dept: CARDIAC REHAB | Facility: CLINIC | Age: 79
Discharge: HOME | End: 2024-03-12
Attending: INTERNAL MEDICINE
Payer: COMMERCIAL

## 2024-03-12 PROCEDURE — 94625 PHY/QHP OP PULM RHB W/O MNTR: CPT

## 2024-03-14 ENCOUNTER — HOSPITAL ENCOUNTER (OUTPATIENT)
Dept: CARDIAC REHAB | Facility: CLINIC | Age: 79
Discharge: HOME | End: 2024-03-14
Attending: INTERNAL MEDICINE
Payer: COMMERCIAL

## 2024-03-14 PROCEDURE — 94625 PHY/QHP OP PULM RHB W/O MNTR: CPT | Performed by: REHABILITATION PRACTITIONER

## 2024-03-19 ENCOUNTER — HOSPITAL ENCOUNTER (OUTPATIENT)
Dept: CARDIAC REHAB | Facility: CLINIC | Age: 79
Discharge: HOME | End: 2024-03-19
Attending: INTERNAL MEDICINE
Payer: COMMERCIAL

## 2024-03-19 PROCEDURE — 94625 PHY/QHP OP PULM RHB W/O MNTR: CPT

## 2024-03-28 ENCOUNTER — HOSPITAL ENCOUNTER (OUTPATIENT)
Dept: CARDIAC REHAB | Facility: CLINIC | Age: 79
Discharge: HOME | End: 2024-03-28
Attending: INTERNAL MEDICINE
Payer: COMMERCIAL

## 2024-03-28 PROCEDURE — 94625 PHY/QHP OP PULM RHB W/O MNTR: CPT | Performed by: REHABILITATION PRACTITIONER

## 2024-04-02 ENCOUNTER — HOSPITAL ENCOUNTER (OUTPATIENT)
Dept: CARDIAC REHAB | Facility: CLINIC | Age: 79
Discharge: HOME | End: 2024-04-02
Attending: INTERNAL MEDICINE
Payer: COMMERCIAL

## 2024-04-02 PROCEDURE — 94625 PHY/QHP OP PULM RHB W/O MNTR: CPT | Performed by: REHABILITATION PRACTITIONER

## 2024-04-04 ENCOUNTER — HOSPITAL ENCOUNTER (OUTPATIENT)
Dept: CARDIAC REHAB | Facility: CLINIC | Age: 79
Discharge: HOME | End: 2024-04-04
Attending: INTERNAL MEDICINE
Payer: COMMERCIAL

## 2024-04-04 PROCEDURE — 94625 PHY/QHP OP PULM RHB W/O MNTR: CPT | Performed by: REHABILITATION PRACTITIONER

## 2024-04-09 ENCOUNTER — HOSPITAL ENCOUNTER (OUTPATIENT)
Dept: CARDIAC REHAB | Facility: CLINIC | Age: 79
Discharge: HOME | End: 2024-04-09
Attending: INTERNAL MEDICINE
Payer: COMMERCIAL

## 2024-04-09 PROCEDURE — 94625 PHY/QHP OP PULM RHB W/O MNTR: CPT

## 2024-04-11 ENCOUNTER — HOSPITAL ENCOUNTER (OUTPATIENT)
Dept: CARDIAC REHAB | Facility: CLINIC | Age: 79
Discharge: HOME | End: 2024-04-11
Attending: INTERNAL MEDICINE
Payer: COMMERCIAL

## 2024-04-11 PROCEDURE — 94625 PHY/QHP OP PULM RHB W/O MNTR: CPT

## 2024-04-16 ENCOUNTER — HOSPITAL ENCOUNTER (OUTPATIENT)
Dept: CARDIAC REHAB | Facility: CLINIC | Age: 79
Discharge: HOME | End: 2024-04-16
Attending: INTERNAL MEDICINE
Payer: COMMERCIAL

## 2024-04-16 PROCEDURE — 94625 PHY/QHP OP PULM RHB W/O MNTR: CPT

## 2024-04-18 ENCOUNTER — HOSPITAL ENCOUNTER (OUTPATIENT)
Dept: CARDIAC REHAB | Facility: CLINIC | Age: 79
Discharge: HOME | End: 2024-04-18
Attending: INTERNAL MEDICINE
Payer: COMMERCIAL

## 2024-04-18 PROCEDURE — 94625 PHY/QHP OP PULM RHB W/O MNTR: CPT | Performed by: REHABILITATION PRACTITIONER

## 2024-04-23 ENCOUNTER — HOSPITAL ENCOUNTER (OUTPATIENT)
Dept: CARDIAC REHAB | Facility: CLINIC | Age: 79
Discharge: HOME | End: 2024-04-23
Attending: INTERNAL MEDICINE
Payer: COMMERCIAL

## 2024-04-23 PROCEDURE — 94625 PHY/QHP OP PULM RHB W/O MNTR: CPT | Performed by: REHABILITATION PRACTITIONER

## 2024-05-02 ENCOUNTER — HOSPITAL ENCOUNTER (OUTPATIENT)
Dept: CARDIAC REHAB | Facility: CLINIC | Age: 79
Discharge: HOME | End: 2024-05-02
Attending: INTERNAL MEDICINE
Payer: COMMERCIAL

## 2024-05-02 PROCEDURE — 94626 PHY/QHP OP PULM RHB W/MNTR: CPT

## 2024-08-28 ENCOUNTER — ANCILLARY PROCEDURE (OUTPATIENT)
Dept: CT IMAGING | Facility: CLINIC | Age: 79
End: 2024-08-28
Attending: THORACIC SURGERY (CARDIOTHORACIC VASCULAR SURGERY)
Payer: COMMERCIAL

## 2024-08-28 DIAGNOSIS — R09.89 ABNORMAL FINDING OF LUNG: ICD-10-CM

## 2024-08-28 PROCEDURE — 71250 CT THORAX DX C-: CPT

## 2025-02-19 ENCOUNTER — LAB REQUISITION (OUTPATIENT)
Dept: LAB | Facility: CLINIC | Age: 80
End: 2025-02-19
Payer: COMMERCIAL

## 2025-02-19 DIAGNOSIS — J44.9 CHRONIC OBSTRUCTIVE PULMONARY DISEASE, UNSPECIFIED (H): ICD-10-CM

## 2025-02-20 LAB
ACANTHOCYTES BLD QL SMEAR: SLIGHT
ANION GAP SERPL CALCULATED.3IONS-SCNC: 11 MMOL/L (ref 7–15)
BASOPHILS # BLD AUTO: 0 10E3/UL (ref 0–0.2)
BASOPHILS NFR BLD AUTO: 0 %
BUN SERPL-MCNC: 22.4 MG/DL (ref 8–23)
CALCIUM SERPL-MCNC: 9.3 MG/DL (ref 8.8–10.4)
CHLORIDE SERPL-SCNC: 101 MMOL/L (ref 98–107)
CREAT SERPL-MCNC: 1.43 MG/DL (ref 0.67–1.17)
EGFRCR SERPLBLD CKD-EPI 2021: 49 ML/MIN/1.73M2
ELLIPTOCYTES BLD QL SMEAR: ABNORMAL
EOSINOPHIL # BLD AUTO: 0.4 10E3/UL (ref 0–0.7)
EOSINOPHIL NFR BLD AUTO: 5 %
ERYTHROCYTE [DISTWIDTH] IN BLOOD BY AUTOMATED COUNT: 14.8 % (ref 10–15)
GLUCOSE SERPL-MCNC: 94 MG/DL (ref 70–99)
HCO3 SERPL-SCNC: 24 MMOL/L (ref 22–29)
HCT VFR BLD AUTO: 34.3 % (ref 40–53)
HGB BLD-MCNC: 11.1 G/DL (ref 13.3–17.7)
IMM GRANULOCYTES # BLD: 0.1 10E3/UL
IMM GRANULOCYTES NFR BLD: 1 %
LYMPHOCYTES # BLD AUTO: 2 10E3/UL (ref 0.8–5.3)
LYMPHOCYTES NFR BLD AUTO: 22 %
MCH RBC QN AUTO: 28.1 PG (ref 26.5–33)
MCHC RBC AUTO-ENTMCNC: 32.4 G/DL (ref 31.5–36.5)
MCV RBC AUTO: 87 FL (ref 78–100)
MONOCYTES # BLD AUTO: 2 10E3/UL (ref 0–1.3)
MONOCYTES NFR BLD AUTO: 23 %
NEUTROPHILS # BLD AUTO: 4.3 10E3/UL (ref 1.6–8.3)
NEUTROPHILS NFR BLD AUTO: 49 %
NRBC # BLD AUTO: 0 10E3/UL
NRBC BLD AUTO-RTO: 0 /100
PLAT MORPH BLD: ABNORMAL
PLATELET # BLD AUTO: 596 10E3/UL (ref 150–450)
POLYCHROMASIA BLD QL SMEAR: SLIGHT
POTASSIUM SERPL-SCNC: 4.7 MMOL/L (ref 3.4–5.3)
RBC # BLD AUTO: 3.95 10E6/UL (ref 4.4–5.9)
RBC MORPH BLD: ABNORMAL
SODIUM SERPL-SCNC: 136 MMOL/L (ref 135–145)
VARIANT LYMPHS BLD QL SMEAR: PRESENT
WBC # BLD AUTO: 8.7 10E3/UL (ref 4–11)

## 2025-02-20 PROCEDURE — 80048 BASIC METABOLIC PNL TOTAL CA: CPT | Mod: ORL | Performed by: INTERNAL MEDICINE

## 2025-02-20 PROCEDURE — 85025 COMPLETE CBC W/AUTO DIFF WBC: CPT | Mod: ORL | Performed by: INTERNAL MEDICINE

## 2025-02-20 PROCEDURE — P9603 ONE-WAY ALLOW PRORATED MILES: HCPCS | Mod: ORL | Performed by: INTERNAL MEDICINE

## 2025-02-20 PROCEDURE — 36415 COLL VENOUS BLD VENIPUNCTURE: CPT | Mod: ORL | Performed by: INTERNAL MEDICINE

## 2025-03-11 DIAGNOSIS — H04.123 DRY EYE SYNDROME OF BOTH EYES: ICD-10-CM

## 2025-03-11 DIAGNOSIS — H53.40 VISUAL FIELD DEFECT: ICD-10-CM

## 2025-03-13 RX ORDER — LIFITEGRAST 50 MG/ML
1 SOLUTION/ DROPS OPHTHALMIC 2 TIMES DAILY
Qty: 60 EACH | Refills: 0 | OUTPATIENT
Start: 2025-03-13

## 2025-03-13 NOTE — TELEPHONE ENCOUNTER
Last Written Prescription:     lifitegrast (XIIDRA) 5 % opthalmic solution 60 each 0 3/8/2024 -- No   Sig - Route: Place 1 drop into both eyes 2 times daily - Both Eyes     ----------------------  Last Visit Date:  3/27/2023  [facility] - Dayron Mann MD  Ophthalmology     Future Visit Date: None  ----------------------             Refill decision: Medication unable to be refilled by RN due to: Pt not seen within past 12 months, No FOV or FOV exceeds timeframe per protocol    lifitegrast (XIIDRA) 5 % opthalmic solution  Last seen 3/27/2023      Request from pharmacy:  Requested Prescriptions   Pending Prescriptions Disp Refills    lifitegrast (XIIDRA) 5 % opthalmic solution 60 each 0     Sig: Place 1 drop into both eyes 2 times daily.       There is no refill protocol information for this order

## 2025-03-17 ENCOUNTER — TELEPHONE (OUTPATIENT)
Dept: OPHTHALMOLOGY | Facility: CLINIC | Age: 80
End: 2025-03-17
Payer: COMMERCIAL